# Patient Record
Sex: FEMALE | Race: BLACK OR AFRICAN AMERICAN | NOT HISPANIC OR LATINO | Employment: OTHER | RURAL
[De-identification: names, ages, dates, MRNs, and addresses within clinical notes are randomized per-mention and may not be internally consistent; named-entity substitution may affect disease eponyms.]

---

## 2021-01-08 ENCOUNTER — HISTORICAL (OUTPATIENT)
Dept: ADMINISTRATIVE | Facility: HOSPITAL | Age: 66
End: 2021-01-08

## 2021-11-04 ENCOUNTER — OFFICE VISIT (OUTPATIENT)
Dept: FAMILY MEDICINE | Facility: CLINIC | Age: 66
End: 2021-11-04
Payer: COMMERCIAL

## 2021-11-04 VITALS
BODY MASS INDEX: 31.83 KG/M2 | HEIGHT: 62 IN | OXYGEN SATURATION: 99 % | SYSTOLIC BLOOD PRESSURE: 216 MMHG | RESPIRATION RATE: 16 BRPM | HEART RATE: 88 BPM | WEIGHT: 173 LBS | DIASTOLIC BLOOD PRESSURE: 96 MMHG

## 2021-11-04 DIAGNOSIS — I10 ESSENTIAL HYPERTENSION: Primary | ICD-10-CM

## 2021-11-04 DIAGNOSIS — E11.9 TYPE 2 DIABETES MELLITUS WITHOUT COMPLICATION, WITHOUT LONG-TERM CURRENT USE OF INSULIN: ICD-10-CM

## 2021-11-04 DIAGNOSIS — E78.5 HYPERLIPIDEMIA, UNSPECIFIED HYPERLIPIDEMIA TYPE: ICD-10-CM

## 2021-11-04 DIAGNOSIS — Z11.59 SCREENING FOR VIRAL DISEASE: ICD-10-CM

## 2021-11-04 PROCEDURE — 82043 MICROALBUMIN / CREATININE RATIO URINE: ICD-10-PCS | Mod: ,,, | Performed by: CLINICAL MEDICAL LABORATORY

## 2021-11-04 PROCEDURE — 86803 HEPATITIS C ANTIBODY: ICD-10-PCS | Mod: ,,, | Performed by: CLINICAL MEDICAL LABORATORY

## 2021-11-04 PROCEDURE — 83036 HEMOGLOBIN A1C: ICD-10-PCS | Mod: ,,, | Performed by: CLINICAL MEDICAL LABORATORY

## 2021-11-04 PROCEDURE — 82570 ASSAY OF URINE CREATININE: CPT | Mod: ,,, | Performed by: CLINICAL MEDICAL LABORATORY

## 2021-11-04 PROCEDURE — 82570 MICROALBUMIN / CREATININE RATIO URINE: ICD-10-PCS | Mod: ,,, | Performed by: CLINICAL MEDICAL LABORATORY

## 2021-11-04 PROCEDURE — 82043 UR ALBUMIN QUANTITATIVE: CPT | Mod: ,,, | Performed by: CLINICAL MEDICAL LABORATORY

## 2021-11-04 PROCEDURE — 86803 HEPATITIS C AB TEST: CPT | Mod: ,,, | Performed by: CLINICAL MEDICAL LABORATORY

## 2021-11-04 PROCEDURE — G0439 PR MEDICARE ANNUAL WELLNESS SUBSEQUENT VISIT: ICD-10-PCS | Mod: ,,, | Performed by: NURSE PRACTITIONER

## 2021-11-04 PROCEDURE — 80061 LIPID PANEL: ICD-10-PCS | Mod: ,,, | Performed by: CLINICAL MEDICAL LABORATORY

## 2021-11-04 PROCEDURE — 80061 LIPID PANEL: CPT | Mod: ,,, | Performed by: CLINICAL MEDICAL LABORATORY

## 2021-11-04 PROCEDURE — 83036 HEMOGLOBIN GLYCOSYLATED A1C: CPT | Mod: ,,, | Performed by: CLINICAL MEDICAL LABORATORY

## 2021-11-04 PROCEDURE — G0439 PPPS, SUBSEQ VISIT: HCPCS | Mod: ,,, | Performed by: NURSE PRACTITIONER

## 2021-11-04 RX ORDER — ATORVASTATIN CALCIUM 20 MG/1
20 TABLET, FILM COATED ORAL NIGHTLY
Qty: 90 TABLET | Refills: 0 | Status: SHIPPED | OUTPATIENT
Start: 2021-11-04 | End: 2021-12-01 | Stop reason: DRUGHIGH

## 2021-11-04 RX ORDER — METFORMIN HYDROCHLORIDE 500 MG/1
500 TABLET ORAL 2 TIMES DAILY
COMMUNITY
Start: 2021-05-22 | End: 2021-11-04 | Stop reason: SDUPTHER

## 2021-11-04 RX ORDER — ATORVASTATIN CALCIUM 20 MG/1
20 TABLET, FILM COATED ORAL NIGHTLY
COMMUNITY
Start: 2021-05-22 | End: 2021-11-04 | Stop reason: SDUPTHER

## 2021-11-04 RX ORDER — AMLODIPINE BESYLATE 10 MG/1
10 TABLET ORAL DAILY
COMMUNITY
Start: 2021-05-22 | End: 2021-11-04 | Stop reason: SDUPTHER

## 2021-11-04 RX ORDER — AMLODIPINE BESYLATE 10 MG/1
10 TABLET ORAL DAILY
Qty: 90 TABLET | Refills: 0 | Status: SHIPPED | OUTPATIENT
Start: 2021-11-04 | End: 2022-01-03 | Stop reason: SDUPTHER

## 2021-11-04 RX ORDER — METFORMIN HYDROCHLORIDE 500 MG/1
500 TABLET ORAL 2 TIMES DAILY
Qty: 180 TABLET | Refills: 0 | Status: SHIPPED | OUTPATIENT
Start: 2021-11-04 | End: 2022-01-03 | Stop reason: SDUPTHER

## 2021-11-05 LAB
CHOLEST SERPL-MCNC: 232 MG/DL (ref 0–200)
CHOLEST/HDLC SERPL: 3.5 {RATIO}
CREAT UR-MCNC: 73 MG/DL (ref 28–219)
EST. AVERAGE GLUCOSE BLD GHB EST-MCNC: 134 MG/DL
HBA1C MFR BLD HPLC: 6.6 % (ref 4.5–6.6)
HCV AB SER QL: NORMAL
HDLC SERPL-MCNC: 67 MG/DL (ref 40–60)
LDLC SERPL CALC-MCNC: 128 MG/DL
LDLC/HDLC SERPL: 1.9 {RATIO}
MICROALBUMIN UR-MCNC: 2.5 MG/DL (ref 0–2.8)
MICROALBUMIN/CREAT RATIO PNL UR: 34.2 MG/G (ref 0–30)
NONHDLC SERPL-MCNC: 165 MG/DL
TRIGL SERPL-MCNC: 186 MG/DL (ref 35–150)
VLDLC SERPL-MCNC: 37 MG/DL

## 2021-11-08 ENCOUNTER — TELEPHONE (OUTPATIENT)
Dept: FAMILY MEDICINE | Facility: CLINIC | Age: 66
End: 2021-11-08
Payer: COMMERCIAL

## 2021-12-01 ENCOUNTER — OFFICE VISIT (OUTPATIENT)
Dept: FAMILY MEDICINE | Facility: CLINIC | Age: 66
End: 2021-12-01
Payer: COMMERCIAL

## 2021-12-01 VITALS
SYSTOLIC BLOOD PRESSURE: 173 MMHG | OXYGEN SATURATION: 100 % | HEIGHT: 62 IN | TEMPERATURE: 98 F | WEIGHT: 172 LBS | BODY MASS INDEX: 31.65 KG/M2 | DIASTOLIC BLOOD PRESSURE: 91 MMHG | HEART RATE: 90 BPM

## 2021-12-01 DIAGNOSIS — E11.9 TYPE 2 DIABETES MELLITUS WITHOUT COMPLICATION, WITHOUT LONG-TERM CURRENT USE OF INSULIN: Primary | ICD-10-CM

## 2021-12-01 DIAGNOSIS — I10 ESSENTIAL HYPERTENSION: ICD-10-CM

## 2021-12-01 DIAGNOSIS — E78.5 HYPERLIPIDEMIA, UNSPECIFIED HYPERLIPIDEMIA TYPE: ICD-10-CM

## 2021-12-01 LAB — GLUCOSE SERPL-MCNC: 126 MG/DL (ref 70–110)

## 2021-12-01 PROCEDURE — 82962 POCT GLUCOSE, HAND-HELD DEVICE: ICD-10-PCS | Mod: ,,, | Performed by: NURSE PRACTITIONER

## 2021-12-01 PROCEDURE — 82962 GLUCOSE BLOOD TEST: CPT | Mod: ,,, | Performed by: NURSE PRACTITIONER

## 2021-12-01 PROCEDURE — 99213 OFFICE O/P EST LOW 20 MIN: CPT | Mod: ,,, | Performed by: NURSE PRACTITIONER

## 2021-12-01 PROCEDURE — 99213 PR OFFICE/OUTPT VISIT, EST, LEVL III, 20-29 MIN: ICD-10-PCS | Mod: ,,, | Performed by: NURSE PRACTITIONER

## 2021-12-01 RX ORDER — ATORVASTATIN CALCIUM 40 MG/1
40 TABLET, FILM COATED ORAL DAILY
Qty: 90 TABLET | Refills: 0 | Status: SHIPPED | OUTPATIENT
Start: 2021-12-01 | End: 2022-01-03 | Stop reason: SDUPTHER

## 2021-12-01 RX ORDER — LOSARTAN POTASSIUM 25 MG/1
25 TABLET ORAL DAILY
Qty: 90 TABLET | Refills: 0 | Status: SHIPPED | OUTPATIENT
Start: 2021-12-01 | End: 2022-01-03 | Stop reason: SDUPTHER

## 2021-12-08 ENCOUNTER — TELEPHONE (OUTPATIENT)
Dept: FAMILY MEDICINE | Facility: CLINIC | Age: 66
End: 2021-12-08
Payer: COMMERCIAL

## 2022-01-03 ENCOUNTER — OFFICE VISIT (OUTPATIENT)
Dept: FAMILY MEDICINE | Facility: CLINIC | Age: 67
End: 2022-01-03
Payer: COMMERCIAL

## 2022-01-03 VITALS
SYSTOLIC BLOOD PRESSURE: 141 MMHG | TEMPERATURE: 97 F | HEIGHT: 62 IN | BODY MASS INDEX: 31.65 KG/M2 | OXYGEN SATURATION: 98 % | DIASTOLIC BLOOD PRESSURE: 80 MMHG | WEIGHT: 172 LBS | HEART RATE: 87 BPM

## 2022-01-03 DIAGNOSIS — E78.5 HYPERLIPIDEMIA, UNSPECIFIED HYPERLIPIDEMIA TYPE: ICD-10-CM

## 2022-01-03 DIAGNOSIS — E11.9 TYPE 2 DIABETES MELLITUS WITHOUT COMPLICATION, WITHOUT LONG-TERM CURRENT USE OF INSULIN: ICD-10-CM

## 2022-01-03 DIAGNOSIS — I10 ESSENTIAL HYPERTENSION: Primary | ICD-10-CM

## 2022-01-03 PROCEDURE — 1160F RVW MEDS BY RX/DR IN RCRD: CPT | Mod: ,,, | Performed by: NURSE PRACTITIONER

## 2022-01-03 PROCEDURE — 1159F MED LIST DOCD IN RCRD: CPT | Mod: ,,, | Performed by: NURSE PRACTITIONER

## 2022-01-03 PROCEDURE — 3008F PR BODY MASS INDEX (BMI) DOCUMENTED: ICD-10-PCS | Mod: ,,, | Performed by: NURSE PRACTITIONER

## 2022-01-03 PROCEDURE — 3079F DIAST BP 80-89 MM HG: CPT | Mod: ,,, | Performed by: NURSE PRACTITIONER

## 2022-01-03 PROCEDURE — 3008F BODY MASS INDEX DOCD: CPT | Mod: ,,, | Performed by: NURSE PRACTITIONER

## 2022-01-03 PROCEDURE — 3288F FALL RISK ASSESSMENT DOCD: CPT | Mod: ,,, | Performed by: NURSE PRACTITIONER

## 2022-01-03 PROCEDURE — 3077F SYST BP >= 140 MM HG: CPT | Mod: ,,, | Performed by: NURSE PRACTITIONER

## 2022-01-03 PROCEDURE — 1160F PR REVIEW ALL MEDS BY PRESCRIBER/CLIN PHARMACIST DOCUMENTED: ICD-10-PCS | Mod: ,,, | Performed by: NURSE PRACTITIONER

## 2022-01-03 PROCEDURE — 3077F PR MOST RECENT SYSTOLIC BLOOD PRESSURE >= 140 MM HG: ICD-10-PCS | Mod: ,,, | Performed by: NURSE PRACTITIONER

## 2022-01-03 PROCEDURE — 3079F PR MOST RECENT DIASTOLIC BLOOD PRESSURE 80-89 MM HG: ICD-10-PCS | Mod: ,,, | Performed by: NURSE PRACTITIONER

## 2022-01-03 PROCEDURE — 3288F PR FALLS RISK ASSESSMENT DOCUMENTED: ICD-10-PCS | Mod: ,,, | Performed by: NURSE PRACTITIONER

## 2022-01-03 PROCEDURE — 1101F PR PT FALLS ASSESS DOC 0-1 FALLS W/OUT INJ PAST YR: ICD-10-PCS | Mod: ,,, | Performed by: NURSE PRACTITIONER

## 2022-01-03 PROCEDURE — 99213 OFFICE O/P EST LOW 20 MIN: CPT | Mod: ,,, | Performed by: NURSE PRACTITIONER

## 2022-01-03 PROCEDURE — 99213 PR OFFICE/OUTPT VISIT, EST, LEVL III, 20-29 MIN: ICD-10-PCS | Mod: ,,, | Performed by: NURSE PRACTITIONER

## 2022-01-03 PROCEDURE — 1101F PT FALLS ASSESS-DOCD LE1/YR: CPT | Mod: ,,, | Performed by: NURSE PRACTITIONER

## 2022-01-03 PROCEDURE — 1159F PR MEDICATION LIST DOCUMENTED IN MEDICAL RECORD: ICD-10-PCS | Mod: ,,, | Performed by: NURSE PRACTITIONER

## 2022-01-03 RX ORDER — METFORMIN HYDROCHLORIDE 500 MG/1
500 TABLET ORAL 2 TIMES DAILY
Qty: 180 TABLET | Refills: 0 | Status: SHIPPED | OUTPATIENT
Start: 2022-01-03 | End: 2022-04-05 | Stop reason: SDUPTHER

## 2022-01-03 RX ORDER — LOSARTAN POTASSIUM 25 MG/1
25 TABLET ORAL DAILY
Qty: 90 TABLET | Refills: 0 | Status: SHIPPED | OUTPATIENT
Start: 2022-01-03 | End: 2022-04-05 | Stop reason: SDUPTHER

## 2022-01-03 RX ORDER — AMLODIPINE BESYLATE 10 MG/1
10 TABLET ORAL DAILY
Qty: 90 TABLET | Refills: 0 | Status: SHIPPED | OUTPATIENT
Start: 2022-01-03 | End: 2022-04-05 | Stop reason: SDUPTHER

## 2022-01-03 RX ORDER — ATORVASTATIN CALCIUM 40 MG/1
40 TABLET, FILM COATED ORAL DAILY
Qty: 90 TABLET | Refills: 0 | Status: SHIPPED | OUTPATIENT
Start: 2022-01-03 | End: 2022-04-05 | Stop reason: SDUPTHER

## 2022-01-03 NOTE — PATIENT INSTRUCTIONS
Continue current medications   Patient Education       DASH Diet   About this topic   DASH stands for Dietary Approaches to Stop Hypertension. The DASH diet may help you lower blood pressure. It may also help keep you from getting high blood pressure. You will eat less fat and more fiber on the DASH diet.  This diet gives you more minerals that fight high blood pressure. Some nutrients in this diet are:  · Potassium ? Acts to help you get rid of salt. This may help to lower blood pressure.  · Calcium ? Makes blood vessels and muscles work the right way  · Magnesium - Helps blood vessels relax  · Fiber ? Helps you feel full. It also helps digestion.  What will the results be?   The DASH diet may help you:  · Lower your blood pressure and cholesterol  · Lower your risk for cancer, heart disease, heart attack, and stroke. It may also lower your risk for heart failure, kidney stones, and diabetes.  · Lose weight or keep a healthy weight  What lifestyle changes are needed?   · Add regular exercise to get the most help from this diet.  · Try to lower stress. Find ways to relax.  · Stop smoking. Avoid secondhand smoke.  · Limit alcohol intake.  What changes to diet are needed?   · Know about poor eating habits. Then, you can fix them as you work with the program.  · This diet encourages fruits and vegetables, whole grains, lean meats, healthy fats, and low-fat or fat-free dairy products.  · This diet is lower in saturated fats, trans-fats, cholesterol, added sugars, and sodium.  Who should use this diet?   This eating plan is good for the whole family. It is also good for people with high blood pressure and those at risk for high blood pressure.  What foods are good to eat?   · Grains: Try to eat 6 to 8 servings of whole grain, high fiber foods each day. These are bread, cereals, brown rice, or pasta.  · Fruits and vegetables: Eat 4 to 5 servings each day. Try to pick many kinds and colors. Fresh or frozen are best. Look  for low sodium or salt-free if you choose canned.  · Dairy: Try to eat 2 to 3 servings of fat free and low fat milk products each day.  · Lean meats, poultry, and seafood: Try to eat 6 servings or less of lean meats, poultry, and seafood each day. Try to choose more low fat or lean meats like chicken and turkey. Eat less red meat. Eat more fish instead.  · Nuts, seeds, and legumes (dry beans and peas): Try to eat 4 to 5 servings each week. Try to pick nuts such as almonds and walnuts, sunflower seeds, peanut butter, soy beans, lentils, kidney beans, and split peas.  · Fats and oils: Try to eat 2 to 3 servings of fats and oils each day. Eat good fats found in fish, nuts, and avocados. Try using olive oil or vegetable oils such as canola oil. Other good oils to try are corn, safflower, sunflower, or soybean oils. Use low-sodium and low-fat salad dressing and mayonnaise.  · Condiments: Pepper, herbs, spices, vinegar, lemon or lime juices are great for seasoning. Be careful to choose low-sodium or salt-free products if you use broths, soups, or soy sauce.  · Sweets: Try to eat less than 5 servings each week. Choose low-fat and trans fat-free desserts. These are things like fruit flavored gelatin, sorbet, jelly beans, gera crackers, animal crackers, low-fat fig bars, and mary snaps. Eat fruit to satisfy your desire for sweets.     What foods should be limited or avoided?   · Grains: Salted breads, rolls, crackers, quick breads, self-rising flours, biscuit mixes, regular bread crumbs, instant hot cereals, commercially-prepared rice, pasta, stuffing mixes  · Fruits and vegetables: Commercially-prepared potatoes and vegetable mixes, regular canned vegetables and juices, vegetables frozen with sauce or pickled vegetables, processed fruits with salt or sodium  · Milk: Whole milk, malted milk, chocolate milk, buttermilk, cheese, ice cream  · Meats and beans: Smoked, cured, salted, or canned fish; meats or poultry such as  marroquin, sausages, sardines; high-fat cuts of meat like beef, lamb, or pork; chicken with the skin on it  · Fats: Cut back on solid fats like butter, lard, and margarine. Eat less food with high saturated fat, cholesterol and total fat.  · Condiments and snacks: Salted and canned peas, beans, and olives; salted snack foods; fried foods; soda or other sweetened drinks  · Sweets: High-fat baked goods such as muffins, donuts, pastries, commercial baked goods, candy bars  · If you choose to drink alcohol, limit the amount you drink. Women should have 1 drink or less per day and men should have 2 drinks or less per day.  Helpful tips   · Avoid eating canned vegetables and processed foods. These have a lot of salt in them. Look for a low-salt or low-sodium choice.  · Try baking or broiling instead of frying food.  · Write down the foods you eat. This will help you track what you have eaten each week.  · When you go to a grocery store, have a list or a meal plan. Do not shop when you are hungry to avoid cravings for foods.  · Read food labels with care. They will show you how much is in a serving. The amount is given as a percentage of the total amount you need each day. Reading labels will help you make healthy food choices.       · Avoid fast foods.  · Talk to your doctor or dietitian to see if you need vitamin and mineral supplements to help you balance your diet.  · Talk to a dietitian for help.  Where can I learn more?   [employer]  https://www.eatright.org/health/wellness/heart-and-cardiovascular-health/dash-diet-reducing-hypertension-through-diet-and-lifestyle   FamilyDoctor.org  http://familydoctor.org/familydoctor/en/prevention-wellness/food-nutrition/weight-loss/the-dash-diet-healthy-eating-to-control-your-blood-pressure.html   Last Reviewed Date   2021-03-15  Consumer Information Use and Disclaimer   This information is not specific medical advice and does not replace information you  "receive from your health care provider. This is only a brief summary of general information. It does NOT include all information about conditions, illnesses, injuries, tests, procedures, treatments, therapies, discharge instructions or life-style choices that may apply to you. You must talk with your health care provider for complete information about your health and treatment options. This information should not be used to decide whether or not to accept your health care providers advice, instructions or recommendations. Only your health care provider has the knowledge and training to provide advice that is right for you.  Copyright   Copyright © 2021 [employer] and its affiliates and/or licensors. All rights reserved.  Patient Education       Diabetes and Diet   The Basics   Written by the doctors and editors at [employer]   Why is diet important in diabetes? -- Diet is important because it is part of diabetes treatment. Many people need to change what they eat and how much they eat to help treat their diabetes. It is important for people to treat their diabetes so that they:  · Keep their blood sugar at or near a normal level  · Prevent long-term problems, such as heart or kidney problems, that can happen in people with diabetes  Changing your diet can also help treat obesity, high blood pressure, and high cholesterol. These conditions can affect people with diabetes and can lead to future problems, such as heart attacks or strokes.  Who will work with me to change my diet? -- Your doctor or nurse will work with you to make a food plan to change your diet. They might also recommend that you work with a "dietitian." A dietitian is an expert on food and eating.  Do I need to eat at the same times every day? -- When and how often you should eat depends, in part, on the diabetes medicines you take. For example:  · People who take about the same amount of insulin at the same time each day (called a "fixed regimen") " "should eat meals at the same times. This is also true for people who take pills that increase insulin levels, such as sulfonylureas. Eating meals at the same time every day helps prevent low blood sugar.  · People who adjust the dose and timing of their insulin each day (called a "flexible regimen") do not always have to eat meals at the same time. That's because they can time their insulin dose for before they plan to eat, and also adjust the dose for how much they plan to eat.  · People who take medicines that don't usually cause low blood sugar, such as metformin, don't have to eat meals at the same time every day.  What do I need to think about when planning what to eat? -- Our bodies break down the food we eat into small pieces called carbohydrates, proteins, and fats.  When planning what to eat, people with diabetes need to think about:  · Carbohydrates (or "carbs") - Carbohydrates, which are sugars that our bodies use for energy, can raise a person's blood sugar level. Your doctor, nurse, or dietitian will tell you how many carbohydrates you should eat at each meal or snack. Foods that have carbohydrates include:  ? Bread, pasta, and rice  ? Vegetables and fruits  ? Dairy foods  ? Foods and drinks with added sugar  It is best to get your carbohydrates from fruits, vegetables, whole grains, and low-fat milk. It is best to avoid drinks with added sugar, like soda, juices, and sports drinks.   · Protein - Your doctor, nurse, or dietitian will tell you how much protein you should eat each day. It is best to eat lean meats, fish, eggs, beans, peas, soy products, nuts, and seeds.  · Fats - The type of fat you eat is more important than the amount of fat. "Saturated" and "trans" fats can increase your risk for heart problems, like a heart attack.  ? Foods that have saturated fats include meat, butter, cheese, and ice cream.  ? Foods that have trans fats include processed food with "partially hydrogenated oils" on " "the ingredient list. This may include fried foods, store bought cookies, muffins, pies, and cakes.  "Monounsaturated" and "polyunsaturated" fats are better for you. Foods with these types of fat include fish, avocado, olive oil, and nuts.  · Calories - People need to eat a certain amount of calories each day to keep their weight the same. People who are overweight and want to lose weight need to eat fewer calories each day.  · Fiber - Eating foods with a lot of fiber can help control a person's blood sugar level. Foods that have a lot of fiber include apples, green beans, peas, beans, lentils, nuts, oatmeal, and whole grains.  · Salt - People who have high blood pressure should not eat foods that contain a lot of salt (also called sodium). People with high blood pressure should also eat healthy foods, such as fruits, vegetables, and low-fat dairy foods.  · Alcohol - Having more than 1 drink (for women) or 2 drinks (for men) a day can raise blood sugar levels. Also, drinks that have fruit juice or soda in them can raise blood sugar levels.  What can I do if I need to lose weight? -- If you need to lose weight, you can:  · Exercise - Try to get at least 30 minutes of physical activity a day, most days of the week. Even gentle exercise, like walking, is good for your health. Some people with diabetes need to change their medicine dose before they exercise. They might also need to check their blood sugar levels before and after exercising.  · Eat fewer calories - Your doctor, nurse, or dietitian can tell you how many calories you should eat each day in order to lose weight.  If you are worried about your weight, size, or shape, talk with your doctor, nurse, or dietitian. They can help you make changes to improve your health.  Can I eat the same foods as my family? -- Yes. You do not need to eat special foods if you have diabetes. You and your family can eat the same foods. Changing your diet is mostly about eating " healthy foods and not eating too much.  What are the other parts of diabetes treatment? -- Besides changing your diet, the other parts of diabetes treatment are:  · Exercise  · Medicines  Some people with diabetes need to learn how to match their diet and exercise with their medicine dose. For example, people who use insulin might need to choose the dose of insulin they give themselves. To choose their dose, they need to think about:  · What they plan to eat at the next meal  · How much exercise they plan to do  · What their blood sugar level is  If the diet and exercise do not match the medicine dose, a person's blood sugar level can get too low or too high. Blood sugar levels that are too low or too high can cause problems.  All topics are updated as new evidence becomes available and our peer review process is complete.  This topic retrieved from [employer] on: Sep 21, 2021.  Topic 66159 Version 7.0  Release: 29.4.2 - C29.263  © 2021 UpToDate, Inc. and/or its affiliates. All rights reserved.  Consumer Information Use and Disclaimer   This information is not specific medical advice and does not replace information you receive from your health care provider. This is only a brief summary of general information. It does NOT include all information about conditions, illnesses, injuries, tests, procedures, treatments, therapies, discharge instructions or life-style choices that may apply to you. You must talk with your health care provider for complete information about your health and treatment options. This information should not be used to decide whether or not to accept your health care provider's advice, instructions or recommendations. Only your health care provider has the knowledge and training to provide advice that is right for you. The use of this information is governed by the [employer] End User License Agreement, available at https://www.[employer]/en/solutions/[employer]/about/diana.The use of [employer]  content is governed by the [facility] Terms of Use. ©2021 [facility] All rights reserved.  Copyright   © 2021 [facility] and/or its affiliates. All rights reserved.

## 2022-01-03 NOTE — PROGRESS NOTES
Clinic note     Patient name: Dari Valverde is a 66 y.o. female   Chief compliant   Chief Complaint   Patient presents with    Follow-up     No problems at this time.        Subjective     History of present illness   In clinic for follow up after starting losartan last office visit   Blood pressure had improved, tolerating medication without problems   Does not have blood pressure log in clinic today   Hx of DM, last a1c was 6.6  Denies any acute concerns at present   Has had COVID vaccine x two doses and booster dose       Social History     Tobacco Use    Smoking status: Never Smoker    Smokeless tobacco: Never Used   Substance Use Topics    Alcohol use: Not Currently    Drug use: Never     Review of patient's allergies indicates:   Allergen Reactions    Lisinopril      Causes cough          Past Medical History:   Diagnosis Date    Diabetes     Hyperlipemia     Hypertension        Past Surgical History:   Procedure Laterality Date     SECTION      CHOLECYSTECTOMY          Family History   Problem Relation Age of Onset    Dementia Mother     Heart disease Father          Current Outpatient Medications:     blood sugar diagnostic Strp, Prodigy test strips, monitor blood glucose daily and as needed, Disp: 30 strip, Rfl: 5    amLODIPine (NORVASC) 10 MG tablet, Take 1 tablet (10 mg total) by mouth once daily., Disp: 90 tablet, Rfl: 0    atorvastatin (LIPITOR) 40 MG tablet, Take 1 tablet (40 mg total) by mouth once daily., Disp: 90 tablet, Rfl: 0    losartan (COZAAR) 25 MG tablet, Take 1 tablet (25 mg total) by mouth once daily., Disp: 90 tablet, Rfl: 0    metFORMIN (GLUCOPHAGE) 500 MG tablet, Take 1 tablet (500 mg total) by mouth 2 (two) times daily., Disp: 180 tablet, Rfl: 0    Review of Systems   Constitutional: Negative for activity change, appetite change, chills, fatigue, fever and unexpected weight change.   HENT: Negative for hearing loss, rhinorrhea and trouble swallowing.    Eyes: Negative for  "discharge and visual disturbance.   Respiratory: Negative for cough, chest tightness, shortness of breath and wheezing.    Cardiovascular: Negative for chest pain, palpitations and leg swelling.   Gastrointestinal: Negative for abdominal pain, blood in stool, change in bowel habit, constipation, diarrhea, nausea, vomiting and change in bowel habit.   Endocrine: Negative for polydipsia and polyuria.   Genitourinary: Negative for difficulty urinating, dysuria, hematuria and menstrual problem.   Musculoskeletal: Negative for arthralgias, gait problem, joint swelling, myalgias and neck pain.   Integumentary:  Negative for wound.   Neurological: Negative for dizziness, syncope, weakness, light-headedness and headaches.   Psychiatric/Behavioral: Negative for confusion, dysphoric mood and sleep disturbance.       Objective     BP (!) 141/80   Pulse 87   Temp 97 °F (36.1 °C)   Ht 5' 2" (1.575 m)   Wt 78 kg (172 lb)   SpO2 98%   BMI 31.46 kg/m²     Physical Exam   Constitutional: She is oriented to person, place, and time. No distress.   HENT:   Head: Normocephalic.   Eyes: Pupils are equal, round, and reactive to light. Conjunctivae are normal.   Cardiovascular: Normal rate and regular rhythm.   Pulmonary/Chest: Effort normal and breath sounds normal.   Abdominal: Soft. Bowel sounds are normal. She exhibits no distension. There is no abdominal tenderness.   Musculoskeletal:         General: Normal range of motion.      Cervical back: Neck supple.      Right lower leg: No edema.      Left lower leg: No edema.   Neurological: She is alert and oriented to person, place, and time. Gait normal.   Skin: Skin is warm and dry.   Psychiatric: Her behavior is normal. Mood normal.     No results found for: WBC, HGB, HCT, MCV, PLT    CMP  No results found for: NA, K, CL, CO2, GLU, BUN, CREATININE, CALCIUM, PROT, ALBUMIN, BILITOT, ALKPHOS, AST, ALT, ANIONGAP, ESTGFRAFRICA, EGFRNONAA  Lab Results   Component Value Date    HGBA1C " 6.6 11/04/2021       Lab Results   Component Value Date    CHOL 232 (H) 11/04/2021     Lab Results   Component Value Date    HDL 67 (H) 11/04/2021     Lab Results   Component Value Date    LDLCALC 128 11/04/2021     Lab Results   Component Value Date    TRIG 186 (H) 11/04/2021     Lab Results   Component Value Date    CHOLHDL 3.5 11/04/2021     No results found for: TSH      Assessment and Plan   Essential hypertension  -     amLODIPine (NORVASC) 10 MG tablet; Take 1 tablet (10 mg total) by mouth once daily.  Dispense: 90 tablet; Refill: 0  -     losartan (COZAAR) 25 MG tablet; Take 1 tablet (25 mg total) by mouth once daily.  Dispense: 90 tablet; Refill: 0    Type 2 diabetes mellitus without complication, without long-term current use of insulin  -     metFORMIN (GLUCOPHAGE) 500 MG tablet; Take 1 tablet (500 mg total) by mouth 2 (two) times daily.  Dispense: 180 tablet; Refill: 0    Hyperlipidemia, unspecified hyperlipidemia type  -     atorvastatin (LIPITOR) 40 MG tablet; Take 1 tablet (40 mg total) by mouth once daily.  Dispense: 90 tablet; Refill: 0          Patient Instructions  Patient Instructions   Continue current medications   Patient Education       DASH Diet   About this topic   DASH stands for Dietary Approaches to Stop Hypertension. The DASH diet may help you lower blood pressure. It may also help keep you from getting high blood pressure. You will eat less fat and more fiber on the DASH diet.  This diet gives you more minerals that fight high blood pressure. Some nutrients in this diet are:  Potassium ? Acts to help you get rid of salt. This may help to lower blood pressure.  Calcium ? Makes blood vessels and muscles work the right way  Magnesium - Helps blood vessels relax  Fiber ? Helps you feel full. It also helps digestion.  What will the results be?   The DASH diet may help you:  Lower your blood pressure and cholesterol  Lower your risk for cancer, heart disease, heart attack, and stroke. It may  also lower your risk for heart failure, kidney stones, and diabetes.  Lose weight or keep a healthy weight  What lifestyle changes are needed?   Add regular exercise to get the most help from this diet.  Try to lower stress. Find ways to relax.  Stop smoking. Avoid secondhand smoke.  Limit alcohol intake.  What changes to diet are needed?   Know about poor eating habits. Then, you can fix them as you work with the program.  This diet encourages fruits and vegetables, whole grains, lean meats, healthy fats, and low-fat or fat-free dairy products.  This diet is lower in saturated fats, trans-fats, cholesterol, added sugars, and sodium.  Who should use this diet?   This eating plan is good for the whole family. It is also good for people with high blood pressure and those at risk for high blood pressure.  What foods are good to eat?   Grains: Try to eat 6 to 8 servings of whole grain, high fiber foods each day. These are bread, cereals, brown rice, or pasta.  Fruits and vegetables: Eat 4 to 5 servings each day. Try to pick many kinds and colors. Fresh or frozen are best. Look for low sodium or salt-free if you choose canned.  Dairy: Try to eat 2 to 3 servings of fat free and low fat milk products each day.  Lean meats, poultry, and seafood: Try to eat 6 servings or less of lean meats, poultry, and seafood each day. Try to choose more low fat or lean meats like chicken and turkey. Eat less red meat. Eat more fish instead.  Nuts, seeds, and legumes (dry beans and peas): Try to eat 4 to 5 servings each week. Try to pick nuts such as almonds and walnuts, sunflower seeds, peanut butter, soy beans, lentils, kidney beans, and split peas.  Fats and oils: Try to eat 2 to 3 servings of fats and oils each day. Eat good fats found in fish, nuts, and avocados. Try using olive oil or vegetable oils such as canola oil. Other good oils to try are corn, safflower, sunflower, or soybean oils. Use low-sodium and low-fat salad dressing  and mayonnaise.  Condiments: Pepper, herbs, spices, vinegar, lemon or lime juices are great for seasoning. Be careful to choose low-sodium or salt-free products if you use broths, soups, or soy sauce.  Sweets: Try to eat less than 5 servings each week. Choose low-fat and trans fat-free desserts. These are things like fruit flavored gelatin, sorbet, jelly beans, gera crackers, animal crackers, low-fat fig bars, and mary snaps. Eat fruit to satisfy your desire for sweets.     What foods should be limited or avoided?   Grains: Salted breads, rolls, crackers, quick breads, self-rising flours, biscuit mixes, regular bread crumbs, instant hot cereals, commercially-prepared rice, pasta, stuffing mixes  Fruits and vegetables: Commercially-prepared potatoes and vegetable mixes, regular canned vegetables and juices, vegetables frozen with sauce or pickled vegetables, processed fruits with salt or sodium  Milk: Whole milk, malted milk, chocolate milk, buttermilk, cheese, ice cream  Meats and beans: Smoked, cured, salted, or canned fish; meats or poultry such as marroquin, sausages, sardines; high-fat cuts of meat like beef, lamb, or pork; chicken with the skin on it  Fats: Cut back on solid fats like butter, lard, and margarine. Eat less food with high saturated fat, cholesterol and total fat.  Condiments and snacks: Salted and canned peas, beans, and olives; salted snack foods; fried foods; soda or other sweetened drinks  Sweets: High-fat baked goods such as muffins, donuts, pastries, commercial baked goods, candy bars  If you choose to drink alcohol, limit the amount you drink. Women should have 1 drink or less per day and men should have 2 drinks or less per day.  Helpful tips   Avoid eating canned vegetables and processed foods. These have a lot of salt in them. Look for a low-salt or low-sodium choice.  Try baking or broiling instead of frying food.  Write down the foods you eat. This will help you track what you have  eaten each week.  When you go to a grocery store, have a list or a meal plan. Do not shop when you are hungry to avoid cravings for foods.  Read food labels with care. They will show you how much is in a serving. The amount is given as a percentage of the total amount you need each day. Reading labels will help you make healthy food choices.       Avoid fast foods.  Talk to your doctor or dietitian to see if you need vitamin and mineral supplements to help you balance your diet.  Talk to a dietitian for help.  Where can I learn more?   Academy [employer] Dietetics  https://www.eatright.org/health/wellness/heart-and-cardiovascular-health/dash-diet-reducing-hypertension-through-diet-and-lifestyle   FamilyDoctor.org  http://familydoctor.org/familydoctor/en/prevention-wellness/food-nutrition/weight-loss/the-dash-diet-healthy-eating-to-control-your-blood-pressure.html   Last Reviewed Date   2021-03-15  Consumer Information Use and Disclaimer   This information is not specific medical advice and does not replace information you receive from your health care provider. This is only a brief summary of general information. It does NOT include all information about conditions, illnesses, injuries, tests, procedures, treatments, therapies, discharge instructions or life-style choices that may apply to you. You must talk with your health care provider for complete information about your health and treatment options. This information should not be used to decide whether or not to accept your health care providers advice, instructions or recommendations. Only your health care provider has the knowledge and training to provide advice that is right for you.  Copyright   Copyright © 2021 UpToDate, Inc. and its affiliates and/or licensors. All rights reserved.  Patient Education       Diabetes and Diet   The Basics   Written by the doctors and editors at [employer]   Why is diet important in diabetes? -- Diet is important because  "it is part of diabetes treatment. Many people need to change what they eat and how much they eat to help treat their diabetes. It is important for people to treat their diabetes so that they:  Keep their blood sugar at or near a normal level  Prevent long-term problems, such as heart or kidney problems, that can happen in people with diabetes  Changing your diet can also help treat obesity, high blood pressure, and high cholesterol. These conditions can affect people with diabetes and can lead to future problems, such as heart attacks or strokes.  Who will work with me to change my diet? -- Your doctor or nurse will work with you to make a food plan to change your diet. They might also recommend that you work with a "dietitian." A dietitian is an expert on food and eating.  Do I need to eat at the same times every day? -- When and how often you should eat depends, in part, on the diabetes medicines you take. For example:  People who take about the same amount of insulin at the same time each day (called a "fixed regimen") should eat meals at the same times. This is also true for people who take pills that increase insulin levels, such as sulfonylureas. Eating meals at the same time every day helps prevent low blood sugar.  People who adjust the dose and timing of their insulin each day (called a "flexible regimen") do not always have to eat meals at the same time. That's because they can time their insulin dose for before they plan to eat, and also adjust the dose for how much they plan to eat.  People who take medicines that don't usually cause low blood sugar, such as metformin, don't have to eat meals at the same time every day.  What do I need to think about when planning what to eat? -- Our bodies break down the food we eat into small pieces called carbohydrates, proteins, and fats.  When planning what to eat, people with diabetes need to think about:  Carbohydrates (or "carbs") - Carbohydrates, which are " "sugars that our bodies use for energy, can raise a person's blood sugar level. Your doctor, nurse, or dietitian will tell you how many carbohydrates you should eat at each meal or snack. Foods that have carbohydrates include:  Bread, pasta, and rice  Vegetables and fruits  Dairy foods  Foods and drinks with added sugar  It is best to get your carbohydrates from fruits, vegetables, whole grains, and low-fat milk. It is best to avoid drinks with added sugar, like soda, juices, and sports drinks.   Protein - Your doctor, nurse, or dietitian will tell you how much protein you should eat each day. It is best to eat lean meats, fish, eggs, beans, peas, soy products, nuts, and seeds.  Fats - The type of fat you eat is more important than the amount of fat. "Saturated" and "trans" fats can increase your risk for heart problems, like a heart attack.  Foods that have saturated fats include meat, butter, cheese, and ice cream.  Foods that have trans fats include processed food with "partially hydrogenated oils" on the ingredient list. This may include fried foods, store bought cookies, muffins, pies, and cakes.  "Monounsaturated" and "polyunsaturated" fats are better for you. Foods with these types of fat include fish, avocado, olive oil, and nuts.  Calories - People need to eat a certain amount of calories each day to keep their weight the same. People who are overweight and want to lose weight need to eat fewer calories each day.  Fiber - Eating foods with a lot of fiber can help control a person's blood sugar level. Foods that have a lot of fiber include apples, green beans, peas, beans, lentils, nuts, oatmeal, and whole grains.  Salt - People who have high blood pressure should not eat foods that contain a lot of salt (also called sodium). People with high blood pressure should also eat healthy foods, such as fruits, vegetables, and low-fat dairy foods.  Alcohol - Having more than 1 drink (for women) or 2 drinks (for men) " a day can raise blood sugar levels. Also, drinks that have fruit juice or soda in them can raise blood sugar levels.  What can I do if I need to lose weight? -- If you need to lose weight, you can:  Exercise - Try to get at least 30 minutes of physical activity a day, most days of the week. Even gentle exercise, like walking, is good for your health. Some people with diabetes need to change their medicine dose before they exercise. They might also need to check their blood sugar levels before and after exercising.  Eat fewer calories - Your doctor, nurse, or dietitian can tell you how many calories you should eat each day in order to lose weight.  If you are worried about your weight, size, or shape, talk with your doctor, nurse, or dietitian. They can help you make changes to improve your health.  Can I eat the same foods as my family? -- Yes. You do not need to eat special foods if you have diabetes. You and your family can eat the same foods. Changing your diet is mostly about eating healthy foods and not eating too much.  What are the other parts of diabetes treatment? -- Besides changing your diet, the other parts of diabetes treatment are:  Exercise  Medicines  Some people with diabetes need to learn how to match their diet and exercise with their medicine dose. For example, people who use insulin might need to choose the dose of insulin they give themselves. To choose their dose, they need to think about:  What they plan to eat at the next meal  How much exercise they plan to do  What their blood sugar level is  If the diet and exercise do not match the medicine dose, a person's blood sugar level can get too low or too high. Blood sugar levels that are too low or too high can cause problems.  All topics are updated as new evidence becomes available and our peer review process is complete.  This topic retrieved from [employer] on: Sep 21, 2021.  Topic 39633 Version 7.0  Release: 29.4.2 - C29.263  © 2021  UpToDate, Inc. and/or its affiliates. All rights reserved.  Consumer Information Use and Disclaimer   This information is not specific medical advice and does not replace information you receive from your health care provider. This is only a brief summary of general information. It does NOT include all information about conditions, illnesses, injuries, tests, procedures, treatments, therapies, discharge instructions or life-style choices that may apply to you. You must talk with your health care provider for complete information about your health and treatment options. This information should not be used to decide whether or not to accept your health care provider's advice, instructions or recommendations. Only your health care provider has the knowledge and training to provide advice that is right for you. The use of this information is governed by the [employer] End User License Agreement, available at https://www.[employer]/en/solutions/[employer]/about/diana.The use of [employer] content is governed by the [employer] Terms of Use. ©2021 UpToDate, Inc. All rights reserved.  Copyright   © 2021 UpToDate, Inc. and/or its affiliates. All rights reserved.        I have reviewed the clinic encounter note and agree with the assessment and plan as put forth by the nurse practitioner.  Dr. Lee Simental M.D.

## 2022-01-13 ENCOUNTER — HOSPITAL ENCOUNTER (OUTPATIENT)
Dept: RADIOLOGY | Facility: HOSPITAL | Age: 67
Discharge: HOME | End: 2022-01-13
Payer: COMMERCIAL

## 2022-01-13 DIAGNOSIS — Z12.31 VISIT FOR SCREENING MAMMOGRAM: ICD-10-CM

## 2022-01-13 PROCEDURE — 77067 SCR MAMMO BI INCL CAD: CPT | Mod: TC

## 2022-04-05 ENCOUNTER — OFFICE VISIT (OUTPATIENT)
Dept: FAMILY MEDICINE | Facility: CLINIC | Age: 67
End: 2022-04-05
Payer: COMMERCIAL

## 2022-04-05 VITALS
BODY MASS INDEX: 32.39 KG/M2 | OXYGEN SATURATION: 99 % | HEART RATE: 92 BPM | SYSTOLIC BLOOD PRESSURE: 171 MMHG | DIASTOLIC BLOOD PRESSURE: 86 MMHG | TEMPERATURE: 97 F | HEIGHT: 62 IN | WEIGHT: 176 LBS

## 2022-04-05 DIAGNOSIS — E78.5 HYPERLIPIDEMIA, UNSPECIFIED HYPERLIPIDEMIA TYPE: ICD-10-CM

## 2022-04-05 DIAGNOSIS — Z13.29 SCREENING FOR HYPOTHYROIDISM: ICD-10-CM

## 2022-04-05 DIAGNOSIS — I10 ESSENTIAL HYPERTENSION: Primary | ICD-10-CM

## 2022-04-05 DIAGNOSIS — E11.9 TYPE 2 DIABETES MELLITUS WITHOUT COMPLICATION, WITHOUT LONG-TERM CURRENT USE OF INSULIN: ICD-10-CM

## 2022-04-05 PROCEDURE — 83036 HEMOGLOBIN GLYCOSYLATED A1C: CPT | Mod: ,,, | Performed by: CLINICAL MEDICAL LABORATORY

## 2022-04-05 PROCEDURE — 3077F PR MOST RECENT SYSTOLIC BLOOD PRESSURE >= 140 MM HG: ICD-10-PCS | Mod: ,,, | Performed by: NURSE PRACTITIONER

## 2022-04-05 PROCEDURE — 3288F PR FALLS RISK ASSESSMENT DOCUMENTED: ICD-10-PCS | Mod: ,,, | Performed by: NURSE PRACTITIONER

## 2022-04-05 PROCEDURE — 84443 TSH: ICD-10-PCS | Mod: GZ,,, | Performed by: CLINICAL MEDICAL LABORATORY

## 2022-04-05 PROCEDURE — 80053 COMPREHENSIVE METABOLIC PANEL: ICD-10-PCS | Mod: ,,, | Performed by: CLINICAL MEDICAL LABORATORY

## 2022-04-05 PROCEDURE — 1159F PR MEDICATION LIST DOCUMENTED IN MEDICAL RECORD: ICD-10-PCS | Mod: ,,, | Performed by: NURSE PRACTITIONER

## 2022-04-05 PROCEDURE — 3008F PR BODY MASS INDEX (BMI) DOCUMENTED: ICD-10-PCS | Mod: ,,, | Performed by: NURSE PRACTITIONER

## 2022-04-05 PROCEDURE — 1159F MED LIST DOCD IN RCRD: CPT | Mod: ,,, | Performed by: NURSE PRACTITIONER

## 2022-04-05 PROCEDURE — 3288F FALL RISK ASSESSMENT DOCD: CPT | Mod: ,,, | Performed by: NURSE PRACTITIONER

## 2022-04-05 PROCEDURE — 84443 ASSAY THYROID STIM HORMONE: CPT | Mod: GZ,,, | Performed by: CLINICAL MEDICAL LABORATORY

## 2022-04-05 PROCEDURE — 3079F DIAST BP 80-89 MM HG: CPT | Mod: ,,, | Performed by: NURSE PRACTITIONER

## 2022-04-05 PROCEDURE — 1160F RVW MEDS BY RX/DR IN RCRD: CPT | Mod: ,,, | Performed by: NURSE PRACTITIONER

## 2022-04-05 PROCEDURE — 3044F HG A1C LEVEL LT 7.0%: CPT | Mod: ,,, | Performed by: NURSE PRACTITIONER

## 2022-04-05 PROCEDURE — 80061 LIPID PANEL: CPT | Mod: ,,, | Performed by: CLINICAL MEDICAL LABORATORY

## 2022-04-05 PROCEDURE — 3044F PR MOST RECENT HEMOGLOBIN A1C LEVEL <7.0%: ICD-10-PCS | Mod: ,,, | Performed by: NURSE PRACTITIONER

## 2022-04-05 PROCEDURE — 80061 LIPID PANEL: ICD-10-PCS | Mod: ,,, | Performed by: CLINICAL MEDICAL LABORATORY

## 2022-04-05 PROCEDURE — 3079F PR MOST RECENT DIASTOLIC BLOOD PRESSURE 80-89 MM HG: ICD-10-PCS | Mod: ,,, | Performed by: NURSE PRACTITIONER

## 2022-04-05 PROCEDURE — 99214 OFFICE O/P EST MOD 30 MIN: CPT | Mod: ,,, | Performed by: NURSE PRACTITIONER

## 2022-04-05 PROCEDURE — 83036 HEMOGLOBIN A1C: ICD-10-PCS | Mod: ,,, | Performed by: CLINICAL MEDICAL LABORATORY

## 2022-04-05 PROCEDURE — 1101F PR PT FALLS ASSESS DOC 0-1 FALLS W/OUT INJ PAST YR: ICD-10-PCS | Mod: ,,, | Performed by: NURSE PRACTITIONER

## 2022-04-05 PROCEDURE — 1126F AMNT PAIN NOTED NONE PRSNT: CPT | Mod: ,,, | Performed by: NURSE PRACTITIONER

## 2022-04-05 PROCEDURE — 85025 CBC WITH DIFFERENTIAL: ICD-10-PCS | Mod: ,,, | Performed by: CLINICAL MEDICAL LABORATORY

## 2022-04-05 PROCEDURE — 1101F PT FALLS ASSESS-DOCD LE1/YR: CPT | Mod: ,,, | Performed by: NURSE PRACTITIONER

## 2022-04-05 PROCEDURE — 3077F SYST BP >= 140 MM HG: CPT | Mod: ,,, | Performed by: NURSE PRACTITIONER

## 2022-04-05 PROCEDURE — 85025 COMPLETE CBC W/AUTO DIFF WBC: CPT | Mod: ,,, | Performed by: CLINICAL MEDICAL LABORATORY

## 2022-04-05 PROCEDURE — 80053 COMPREHEN METABOLIC PANEL: CPT | Mod: ,,, | Performed by: CLINICAL MEDICAL LABORATORY

## 2022-04-05 PROCEDURE — 3008F BODY MASS INDEX DOCD: CPT | Mod: ,,, | Performed by: NURSE PRACTITIONER

## 2022-04-05 PROCEDURE — 99214 PR OFFICE/OUTPT VISIT, EST, LEVL IV, 30-39 MIN: ICD-10-PCS | Mod: ,,, | Performed by: NURSE PRACTITIONER

## 2022-04-05 PROCEDURE — 1126F PR PAIN SEVERITY QUANTIFIED, NO PAIN PRESENT: ICD-10-PCS | Mod: ,,, | Performed by: NURSE PRACTITIONER

## 2022-04-05 PROCEDURE — 1160F PR REVIEW ALL MEDS BY PRESCRIBER/CLIN PHARMACIST DOCUMENTED: ICD-10-PCS | Mod: ,,, | Performed by: NURSE PRACTITIONER

## 2022-04-05 RX ORDER — METFORMIN HYDROCHLORIDE 500 MG/1
500 TABLET ORAL 2 TIMES DAILY
Qty: 180 TABLET | Refills: 1 | Status: SHIPPED | OUTPATIENT
Start: 2022-04-05 | End: 2022-11-01 | Stop reason: SDUPTHER

## 2022-04-05 RX ORDER — AMLODIPINE BESYLATE 10 MG/1
10 TABLET ORAL DAILY
Qty: 90 TABLET | Refills: 1 | Status: SHIPPED | OUTPATIENT
Start: 2022-04-05 | End: 2022-11-01 | Stop reason: SDUPTHER

## 2022-04-05 RX ORDER — LOSARTAN POTASSIUM 25 MG/1
25 TABLET ORAL DAILY
Qty: 90 TABLET | Refills: 1 | Status: SHIPPED | OUTPATIENT
Start: 2022-04-05 | End: 2022-11-01 | Stop reason: SDUPTHER

## 2022-04-05 RX ORDER — ATORVASTATIN CALCIUM 40 MG/1
40 TABLET, FILM COATED ORAL DAILY
Qty: 90 TABLET | Refills: 1 | Status: SHIPPED | OUTPATIENT
Start: 2022-04-05 | End: 2022-11-01 | Stop reason: SDUPTHER

## 2022-04-05 NOTE — PATIENT INSTRUCTIONS
Lab obtained in clinic today, we will notify you of results and any necessary changes to plan of care     Refills on routine medications   You can contact clinic if you decide to proceed with Moderna COVID vaccine booster dose   Blood pressure log and bring in to all clinic visits   Follow up in 6 months and as needed

## 2022-04-06 LAB
ALBUMIN SERPL BCP-MCNC: 3.8 G/DL (ref 3.5–5)
ALBUMIN/GLOB SERPL: 0.9 {RATIO}
ALP SERPL-CCNC: 92 U/L (ref 55–142)
ALT SERPL W P-5'-P-CCNC: 28 U/L (ref 13–56)
ANION GAP SERPL CALCULATED.3IONS-SCNC: 9 MMOL/L (ref 7–16)
AST SERPL W P-5'-P-CCNC: 10 U/L (ref 15–37)
BASOPHILS # BLD AUTO: 0.03 K/UL (ref 0–0.2)
BASOPHILS NFR BLD AUTO: 0.5 % (ref 0–1)
BILIRUB SERPL-MCNC: 0.2 MG/DL (ref 0–1.2)
BUN SERPL-MCNC: 8 MG/DL (ref 7–18)
BUN/CREAT SERPL: 11 (ref 6–20)
CALCIUM SERPL-MCNC: 9.6 MG/DL (ref 8.5–10.1)
CHLORIDE SERPL-SCNC: 108 MMOL/L (ref 98–107)
CHOLEST SERPL-MCNC: 139 MG/DL (ref 0–200)
CHOLEST/HDLC SERPL: 2 {RATIO}
CO2 SERPL-SCNC: 27 MMOL/L (ref 21–32)
CREAT SERPL-MCNC: 0.76 MG/DL (ref 0.55–1.02)
DIFFERENTIAL METHOD BLD: ABNORMAL
EOSINOPHIL # BLD AUTO: 0.07 K/UL (ref 0–0.5)
EOSINOPHIL NFR BLD AUTO: 1.2 % (ref 1–4)
ERYTHROCYTE [DISTWIDTH] IN BLOOD BY AUTOMATED COUNT: 12.9 % (ref 11.5–14.5)
EST. AVERAGE GLUCOSE BLD GHB EST-MCNC: 127 MG/DL
GLOBULIN SER-MCNC: 4.2 G/DL (ref 2–4)
GLUCOSE SERPL-MCNC: 93 MG/DL (ref 74–106)
HBA1C MFR BLD HPLC: 6.4 % (ref 4.5–6.6)
HCT VFR BLD AUTO: 36.3 % (ref 38–47)
HDLC SERPL-MCNC: 70 MG/DL (ref 40–60)
HGB BLD-MCNC: 11.7 G/DL (ref 12–16)
IMM GRANULOCYTES # BLD AUTO: 0.06 K/UL (ref 0–0.04)
IMM GRANULOCYTES NFR BLD: 1 % (ref 0–0.4)
LDLC SERPL CALC-MCNC: 44 MG/DL
LDLC/HDLC SERPL: 0.6 {RATIO}
LYMPHOCYTES # BLD AUTO: 2.76 K/UL (ref 1–4.8)
LYMPHOCYTES NFR BLD AUTO: 47.1 % (ref 27–41)
MCH RBC QN AUTO: 28.9 PG (ref 27–31)
MCHC RBC AUTO-ENTMCNC: 32.2 G/DL (ref 32–36)
MCV RBC AUTO: 89.6 FL (ref 80–96)
MONOCYTES # BLD AUTO: 0.58 K/UL (ref 0–0.8)
MONOCYTES NFR BLD AUTO: 9.9 % (ref 2–6)
MPC BLD CALC-MCNC: 10.6 FL (ref 9.4–12.4)
NEUTROPHILS # BLD AUTO: 2.36 K/UL (ref 1.8–7.7)
NEUTROPHILS NFR BLD AUTO: 40.3 % (ref 53–65)
NONHDLC SERPL-MCNC: 69 MG/DL
NRBC # BLD AUTO: 0 X10E3/UL
NRBC, AUTO (.00): 0 %
PLATELET # BLD AUTO: 398 K/UL (ref 150–400)
POTASSIUM SERPL-SCNC: 4.5 MMOL/L (ref 3.5–5.1)
PROT SERPL-MCNC: 8 G/DL (ref 6.4–8.2)
RBC # BLD AUTO: 4.05 M/UL (ref 4.2–5.4)
SODIUM SERPL-SCNC: 139 MMOL/L (ref 136–145)
TRIGL SERPL-MCNC: 126 MG/DL (ref 35–150)
TSH SERPL DL<=0.005 MIU/L-ACNC: 2.02 UIU/ML (ref 0.36–3.74)
VLDLC SERPL-MCNC: 25 MG/DL
WBC # BLD AUTO: 5.86 K/UL (ref 4.5–11)

## 2022-04-11 NOTE — PROGRESS NOTES
Clinic note     Patient name: Dari Valverde is a 67 y.o. female   Chief compliant   Chief Complaint   Patient presents with    Medication Refill    Hand Pain     4th finger right hand the last couple of weeks.        Subjective     History of present illness   In clinic for routine follow up, denies any acute concerns at present   Hx of DM, checking blood glucose daily with am readings 120's and pm readings 160's  Last A1c was 6.6  Blood pressure elevated in clinic, she monitors blood pressure at home and reports reading this morning of 133/70  She reports some pain to right hand for two weeks, denies any known injury, declines any imaging or further evaluation at this time; states she will consider in future if symptoms persist        Social History     Tobacco Use    Smoking status: Never Smoker    Smokeless tobacco: Never Used   Substance Use Topics    Alcohol use: Not Currently    Drug use: Never       Review of patient's allergies indicates:   Allergen Reactions    Lisinopril      Causes cough        Past Medical History:   Diagnosis Date    Diabetes     Hyperlipemia     Hypertension        Past Surgical History:   Procedure Laterality Date     SECTION      CHOLECYSTECTOMY          Family History   Problem Relation Age of Onset    Dementia Mother     Heart disease Father          Current Outpatient Medications:     blood sugar diagnostic Strp, Prodigy test strips, monitor blood glucose daily and as needed, Disp: 30 strip, Rfl: 5    amLODIPine (NORVASC) 10 MG tablet, Take 1 tablet (10 mg total) by mouth once daily., Disp: 90 tablet, Rfl: 1    atorvastatin (LIPITOR) 40 MG tablet, Take 1 tablet (40 mg total) by mouth once daily., Disp: 90 tablet, Rfl: 1    losartan (COZAAR) 25 MG tablet, Take 1 tablet (25 mg total) by mouth once daily., Disp: 90 tablet, Rfl: 1    metFORMIN (GLUCOPHAGE) 500 MG tablet, Take 1 tablet (500 mg total) by mouth 2 (two) times daily., Disp: 180 tablet, Rfl:  "1    Review of Systems   Constitutional: Negative for appetite change, chills, fatigue, fever and unexpected weight change.   Eyes: Negative for visual disturbance.   Respiratory: Negative for cough and shortness of breath.    Cardiovascular: Negative for chest pain, palpitations and leg swelling.   Gastrointestinal: Negative for abdominal pain, change in bowel habit, constipation, diarrhea, nausea, vomiting and change in bowel habit.   Endocrine: Negative for polydipsia and polyuria.   Genitourinary: Negative for dysuria.   Musculoskeletal: Positive for arthralgias. Negative for gait problem and myalgias.   Integumentary:  Negative for wound.   Neurological: Negative for dizziness, syncope, light-headedness and headaches.   Psychiatric/Behavioral: Negative for confusion, dysphoric mood and sleep disturbance. The patient is not nervous/anxious.        Objective     BP (!) 171/86   Pulse 92   Temp 97.3 °F (36.3 °C)   Ht 5' 2" (1.575 m)   Wt 79.8 kg (176 lb)   SpO2 99%   BMI 32.19 kg/m²     Physical Exam   Constitutional: She is oriented to person, place, and time. No distress.   HENT:   Head: Normocephalic.   Mouth/Throat: Mucous membranes are moist.   Eyes: Pupils are equal, round, and reactive to light. Conjunctivae are normal.   Cardiovascular: Normal rate and regular rhythm.   Pulmonary/Chest: Effort normal and breath sounds normal. No respiratory distress. She has no wheezes. She has no rhonchi. She has no rales.   Abdominal: Soft. Bowel sounds are normal. She exhibits no distension. There is no abdominal tenderness.   Musculoskeletal:         General: Normal range of motion.      Cervical back: Normal range of motion and neck supple.      Right lower leg: No edema.      Left lower leg: No edema.   Neurological: She is alert and oriented to person, place, and time. Gait normal.   Skin: Skin is warm and dry.   Psychiatric: Her behavior is normal. Mood normal.       Lab Results   Component Value Date    WBC " 5.86 04/05/2022    HGB 11.7 (L) 04/05/2022    HCT 36.3 (L) 04/05/2022    MCV 89.6 04/05/2022     04/05/2022       CMP  Sodium   Date Value Ref Range Status   04/05/2022 139 136 - 145 mmol/L Final     Potassium   Date Value Ref Range Status   04/05/2022 4.5 3.5 - 5.1 mmol/L Final     Chloride   Date Value Ref Range Status   04/05/2022 108 (H) 98 - 107 mmol/L Final     CO2   Date Value Ref Range Status   04/05/2022 27 21 - 32 mmol/L Final     Glucose   Date Value Ref Range Status   04/05/2022 93 74 - 106 mg/dL Final     BUN   Date Value Ref Range Status   04/05/2022 8 7 - 18 mg/dL Final     Creatinine   Date Value Ref Range Status   04/05/2022 0.76 0.55 - 1.02 mg/dL Final     Calcium   Date Value Ref Range Status   04/05/2022 9.6 8.5 - 10.1 mg/dL Final     Total Protein   Date Value Ref Range Status   04/05/2022 8.0 6.4 - 8.2 g/dL Final     Albumin   Date Value Ref Range Status   04/05/2022 3.8 3.5 - 5.0 g/dL Final     Bilirubin, Total   Date Value Ref Range Status   04/05/2022 0.2 0.0 - 1.2 mg/dL Final     Alk Phos   Date Value Ref Range Status   04/05/2022 92 55 - 142 U/L Final     AST   Date Value Ref Range Status   04/05/2022 10 (L) 15 - 37 U/L Final     ALT   Date Value Ref Range Status   04/05/2022 28 13 - 56 U/L Final     Anion Gap   Date Value Ref Range Status   04/05/2022 9 7 - 16 mmol/L Final     eGFR   Date Value Ref Range Status   04/05/2022 80 >=60 mL/min/1.73m² Final     Lab Results   Component Value Date    TSH 2.020 04/05/2022     Lab Results   Component Value Date    CHOL 139 04/05/2022    CHOL 232 (H) 11/04/2021     Lab Results   Component Value Date    HDL 70 (H) 04/05/2022    HDL 67 (H) 11/04/2021     Lab Results   Component Value Date    LDLCALC 44 04/05/2022    LDLCALC 128 11/04/2021     Lab Results   Component Value Date    TRIG 126 04/05/2022    TRIG 186 (H) 11/04/2021     Lab Results   Component Value Date    CHOLHDL 2.0 04/05/2022    CHOLHDL 3.5 11/04/2021     Lab Results   Component  Value Date    HGBA1C 6.4 04/05/2022         Assessment and Plan   Essential hypertension  -     CBC Auto Differential; Future; Expected date: 04/05/2022  -     Comprehensive Metabolic Panel; Future; Expected date: 04/05/2022  -     losartan (COZAAR) 25 MG tablet; Take 1 tablet (25 mg total) by mouth once daily.  Dispense: 90 tablet; Refill: 1  -     amLODIPine (NORVASC) 10 MG tablet; Take 1 tablet (10 mg total) by mouth once daily.  Dispense: 90 tablet; Refill: 1    Type 2 diabetes mellitus without complication, without long-term current use of insulin  -     Hemoglobin A1C; Future; Expected date: 04/05/2022  -     metFORMIN (GLUCOPHAGE) 500 MG tablet; Take 1 tablet (500 mg total) by mouth 2 (two) times daily.  Dispense: 180 tablet; Refill: 1    Hyperlipidemia, unspecified hyperlipidemia type  -     Lipid Panel; Future; Expected date: 04/05/2022  -     atorvastatin (LIPITOR) 40 MG tablet; Take 1 tablet (40 mg total) by mouth once daily.  Dispense: 90 tablet; Refill: 1    Screening for hypothyroidism  -     TSH; Future; Expected date: 04/05/2022          Patient Instructions  Patient Instructions   Lab obtained in clinic today, we will notify you of results and any necessary changes to plan of care     Refills on routine medications   You can contact clinic if you decide to proceed with Moderna COVID vaccine booster dose   Blood pressure log and bring in to all clinic visits   Follow up in 6 months and as needed

## 2022-07-20 LAB
LEFT EYE DM RETINOPATHY: NEGATIVE
RIGHT EYE DM RETINOPATHY: NEGATIVE

## 2022-11-01 ENCOUNTER — OFFICE VISIT (OUTPATIENT)
Dept: FAMILY MEDICINE | Facility: CLINIC | Age: 67
End: 2022-11-01
Payer: COMMERCIAL

## 2022-11-01 VITALS
OXYGEN SATURATION: 98 % | SYSTOLIC BLOOD PRESSURE: 150 MMHG | DIASTOLIC BLOOD PRESSURE: 83 MMHG | BODY MASS INDEX: 32.76 KG/M2 | HEART RATE: 84 BPM | WEIGHT: 178 LBS | HEIGHT: 62 IN | TEMPERATURE: 98 F

## 2022-11-01 DIAGNOSIS — I10 ESSENTIAL HYPERTENSION: ICD-10-CM

## 2022-11-01 DIAGNOSIS — E11.9 TYPE 2 DIABETES MELLITUS WITHOUT COMPLICATION, WITHOUT LONG-TERM CURRENT USE OF INSULIN: Primary | ICD-10-CM

## 2022-11-01 DIAGNOSIS — E78.5 HYPERLIPIDEMIA, UNSPECIFIED HYPERLIPIDEMIA TYPE: ICD-10-CM

## 2022-11-01 DIAGNOSIS — M65.30 TRIGGER FINGER OF RIGHT HAND, UNSPECIFIED FINGER: ICD-10-CM

## 2022-11-01 LAB
CREAT UR-MCNC: 83 MG/DL (ref 28–219)
EST. AVERAGE GLUCOSE BLD GHB EST-MCNC: 134 MG/DL
HBA1C MFR BLD HPLC: 6.6 % (ref 4.5–6.6)
MICROALBUMIN UR-MCNC: 0.9 MG/DL (ref 0–2.8)
MICROALBUMIN/CREAT RATIO PNL UR: 10.8 MG/G (ref 0–30)

## 2022-11-01 PROCEDURE — 1159F PR MEDICATION LIST DOCUMENTED IN MEDICAL RECORD: ICD-10-PCS | Mod: ,,, | Performed by: NURSE PRACTITIONER

## 2022-11-01 PROCEDURE — 3288F FALL RISK ASSESSMENT DOCD: CPT | Mod: ,,, | Performed by: NURSE PRACTITIONER

## 2022-11-01 PROCEDURE — 83036 HEMOGLOBIN A1C: ICD-10-PCS | Mod: ,,, | Performed by: CLINICAL MEDICAL LABORATORY

## 2022-11-01 PROCEDURE — 99213 OFFICE O/P EST LOW 20 MIN: CPT | Mod: ,,, | Performed by: NURSE PRACTITIONER

## 2022-11-01 PROCEDURE — 82043 MICROALBUMIN / CREATININE RATIO URINE: ICD-10-PCS | Mod: ,,, | Performed by: CLINICAL MEDICAL LABORATORY

## 2022-11-01 PROCEDURE — 1160F RVW MEDS BY RX/DR IN RCRD: CPT | Mod: ,,, | Performed by: NURSE PRACTITIONER

## 2022-11-01 PROCEDURE — 82043 UR ALBUMIN QUANTITATIVE: CPT | Mod: ,,, | Performed by: CLINICAL MEDICAL LABORATORY

## 2022-11-01 PROCEDURE — 3077F PR MOST RECENT SYSTOLIC BLOOD PRESSURE >= 140 MM HG: ICD-10-PCS | Mod: ,,, | Performed by: NURSE PRACTITIONER

## 2022-11-01 PROCEDURE — 1126F PR PAIN SEVERITY QUANTIFIED, NO PAIN PRESENT: ICD-10-PCS | Mod: ,,, | Performed by: NURSE PRACTITIONER

## 2022-11-01 PROCEDURE — 1101F PR PT FALLS ASSESS DOC 0-1 FALLS W/OUT INJ PAST YR: ICD-10-PCS | Mod: ,,, | Performed by: NURSE PRACTITIONER

## 2022-11-01 PROCEDURE — 4010F ACE/ARB THERAPY RXD/TAKEN: CPT | Mod: ,,, | Performed by: NURSE PRACTITIONER

## 2022-11-01 PROCEDURE — 1160F PR REVIEW ALL MEDS BY PRESCRIBER/CLIN PHARMACIST DOCUMENTED: ICD-10-PCS | Mod: ,,, | Performed by: NURSE PRACTITIONER

## 2022-11-01 PROCEDURE — 83036 HEMOGLOBIN GLYCOSYLATED A1C: CPT | Mod: ,,, | Performed by: CLINICAL MEDICAL LABORATORY

## 2022-11-01 PROCEDURE — 1101F PT FALLS ASSESS-DOCD LE1/YR: CPT | Mod: ,,, | Performed by: NURSE PRACTITIONER

## 2022-11-01 PROCEDURE — 3077F SYST BP >= 140 MM HG: CPT | Mod: ,,, | Performed by: NURSE PRACTITIONER

## 2022-11-01 PROCEDURE — 3288F PR FALLS RISK ASSESSMENT DOCUMENTED: ICD-10-PCS | Mod: ,,, | Performed by: NURSE PRACTITIONER

## 2022-11-01 PROCEDURE — 4010F PR ACE/ARB THEARPY RXD/TAKEN: ICD-10-PCS | Mod: ,,, | Performed by: NURSE PRACTITIONER

## 2022-11-01 PROCEDURE — 3044F PR MOST RECENT HEMOGLOBIN A1C LEVEL <7.0%: ICD-10-PCS | Mod: ,,, | Performed by: NURSE PRACTITIONER

## 2022-11-01 PROCEDURE — 1126F AMNT PAIN NOTED NONE PRSNT: CPT | Mod: ,,, | Performed by: NURSE PRACTITIONER

## 2022-11-01 PROCEDURE — 3044F HG A1C LEVEL LT 7.0%: CPT | Mod: ,,, | Performed by: NURSE PRACTITIONER

## 2022-11-01 PROCEDURE — 3079F DIAST BP 80-89 MM HG: CPT | Mod: ,,, | Performed by: NURSE PRACTITIONER

## 2022-11-01 PROCEDURE — 99213 PR OFFICE/OUTPT VISIT, EST, LEVL III, 20-29 MIN: ICD-10-PCS | Mod: ,,, | Performed by: NURSE PRACTITIONER

## 2022-11-01 PROCEDURE — 1159F MED LIST DOCD IN RCRD: CPT | Mod: ,,, | Performed by: NURSE PRACTITIONER

## 2022-11-01 PROCEDURE — 82570 ASSAY OF URINE CREATININE: CPT | Mod: ,,, | Performed by: CLINICAL MEDICAL LABORATORY

## 2022-11-01 PROCEDURE — 82570 MICROALBUMIN / CREATININE RATIO URINE: ICD-10-PCS | Mod: ,,, | Performed by: CLINICAL MEDICAL LABORATORY

## 2022-11-01 PROCEDURE — 3079F PR MOST RECENT DIASTOLIC BLOOD PRESSURE 80-89 MM HG: ICD-10-PCS | Mod: ,,, | Performed by: NURSE PRACTITIONER

## 2022-11-01 RX ORDER — ATORVASTATIN CALCIUM 40 MG/1
40 TABLET, FILM COATED ORAL DAILY
Qty: 90 TABLET | Refills: 1 | Status: SHIPPED | OUTPATIENT
Start: 2022-11-01 | End: 2023-06-20 | Stop reason: SDUPTHER

## 2022-11-01 RX ORDER — METFORMIN HYDROCHLORIDE 500 MG/1
500 TABLET ORAL 2 TIMES DAILY
Qty: 180 TABLET | Refills: 1 | Status: SHIPPED | OUTPATIENT
Start: 2022-11-01 | End: 2023-06-20 | Stop reason: SDUPTHER

## 2022-11-01 RX ORDER — AMLODIPINE BESYLATE 10 MG/1
10 TABLET ORAL DAILY
Qty: 90 TABLET | Refills: 1 | Status: SHIPPED | OUTPATIENT
Start: 2022-11-01 | End: 2023-06-20 | Stop reason: SDUPTHER

## 2022-11-01 RX ORDER — LOSARTAN POTASSIUM 25 MG/1
25 TABLET ORAL DAILY
Qty: 90 TABLET | Refills: 1 | Status: SHIPPED | OUTPATIENT
Start: 2022-11-01 | End: 2023-06-20 | Stop reason: SDUPTHER

## 2022-11-01 NOTE — PATIENT INSTRUCTIONS
Lab obtained in clinic today, we will notify you of results and any necessary changes to plan of care   Refills on routine medications   Follow up in six months and as needed

## 2022-11-01 NOTE — PROGRESS NOTES
Clinic note     Patient name: Dair Valverde is a 67 y.o. female   Chief compliant   Chief Complaint   Patient presents with    Medication Refill     No problems at this time.        Subjective     History of present illness   In clinic for routine follow up, denies any acute concerns at present   Hx of DM, checking blood glucose BID  with yesterday am reading 119  and pm reading 154  Last A1c was 6.4, this will be rechecked today   Blood pressure elevated in clinic, she monitors blood pressure at home and reports reading this morning of 134/73 using wrist blood pressure monitor  Denies any chest pain, shortness of breath, dizziness, palpitations or headache  She reports ongoing intermittent locking and pain to fourth finger on right hand, discussed use of OTC Voltaren gel and ibuprofen; she declines referral at this time        Social History     Tobacco Use    Smoking status: Never    Smokeless tobacco: Never   Substance Use Topics    Alcohol use: Never    Drug use: Never       Review of patient's allergies indicates:   Allergen Reactions    Lisinopril      Causes cough        Past Medical History:   Diagnosis Date    Diabetes     Hyperlipemia     Hypertension        Past Surgical History:   Procedure Laterality Date     SECTION      CHOLECYSTECTOMY      TUBAL LIGATION          Family History   Problem Relation Age of Onset    Dementia Mother     Heart disease Father     Stroke Maternal Grandmother          Current Outpatient Medications:     amLODIPine (NORVASC) 10 MG tablet, Take 1 tablet (10 mg total) by mouth once daily., Disp: 90 tablet, Rfl: 1    atorvastatin (LIPITOR) 40 MG tablet, Take 1 tablet (40 mg total) by mouth once daily., Disp: 90 tablet, Rfl: 1    blood sugar diagnostic Strp, Prodigy test strips, monitor blood glucose daily and as needed, Disp: 200 strip, Rfl: 3    losartan (COZAAR) 25 MG tablet, Take 1 tablet (25 mg total) by mouth once daily., Disp: 90 tablet, Rfl: 1    metFORMIN  "(GLUCOPHAGE) 500 MG tablet, Take 1 tablet (500 mg total) by mouth 2 (two) times daily., Disp: 180 tablet, Rfl: 1    Review of Systems   Constitutional:  Negative for activity change, appetite change, chills, fatigue, fever and unexpected weight change.   HENT:  Negative for hearing loss, rhinorrhea and trouble swallowing.    Eyes:  Negative for discharge and visual disturbance.   Respiratory:  Negative for cough, chest tightness, shortness of breath and wheezing.    Cardiovascular:  Negative for chest pain, palpitations and leg swelling.   Gastrointestinal:  Negative for abdominal pain, blood in stool, change in bowel habit, constipation, diarrhea, nausea, vomiting and change in bowel habit.   Endocrine: Negative for polydipsia and polyuria.   Genitourinary:  Negative for difficulty urinating, dysuria, hematuria and menstrual problem.   Musculoskeletal:  Negative for arthralgias, gait problem, joint swelling, myalgias and neck pain.   Integumentary:  Negative for wound.   Neurological:  Negative for dizziness, syncope, weakness, light-headedness and headaches.   Psychiatric/Behavioral:  Negative for confusion, dysphoric mood and sleep disturbance.      Objective     BP (!) 150/83   Pulse 84   Temp 97.8 °F (36.6 °C)   Ht 5' 2" (1.575 m)   Wt 80.7 kg (178 lb)   SpO2 98%   BMI 32.56 kg/m²     Physical Exam   Constitutional: She is oriented to person, place, and time. No distress.   HENT:   Head: Atraumatic.   Mouth/Throat: Mucous membranes are moist.   Eyes: Pupils are equal, round, and reactive to light. Conjunctivae are normal.   Cardiovascular: Normal rate and regular rhythm. Pulmonary:      Effort: Pulmonary effort is normal. No respiratory distress.      Breath sounds: Normal breath sounds. No wheezing, rhonchi or rales.     Abdominal: Soft. Bowel sounds are normal. She exhibits no distension. There is no abdominal tenderness.   Musculoskeletal:         General: Normal range of motion.      Cervical back: " Neck supple.      Right lower leg: No edema.      Left lower leg: No edema.      Comments: Tenderness to right hand palm   Neurological: She is alert and oriented to person, place, and time. Gait normal.   Skin: Skin is warm and dry.   Psychiatric: Her behavior is normal. Mood normal.     Lab Results   Component Value Date    WBC 5.86 04/05/2022    HGB 11.7 (L) 04/05/2022    HCT 36.3 (L) 04/05/2022    MCV 89.6 04/05/2022     04/05/2022       CMP  Sodium   Date Value Ref Range Status   04/05/2022 139 136 - 145 mmol/L Final     Potassium   Date Value Ref Range Status   04/05/2022 4.5 3.5 - 5.1 mmol/L Final     Chloride   Date Value Ref Range Status   04/05/2022 108 (H) 98 - 107 mmol/L Final     CO2   Date Value Ref Range Status   04/05/2022 27 21 - 32 mmol/L Final     Glucose   Date Value Ref Range Status   04/05/2022 93 74 - 106 mg/dL Final     BUN   Date Value Ref Range Status   04/05/2022 8 7 - 18 mg/dL Final     Creatinine   Date Value Ref Range Status   04/05/2022 0.76 0.55 - 1.02 mg/dL Final     Calcium   Date Value Ref Range Status   04/05/2022 9.6 8.5 - 10.1 mg/dL Final     Total Protein   Date Value Ref Range Status   04/05/2022 8.0 6.4 - 8.2 g/dL Final     Albumin   Date Value Ref Range Status   04/05/2022 3.8 3.5 - 5.0 g/dL Final     Bilirubin, Total   Date Value Ref Range Status   04/05/2022 0.2 0.0 - 1.2 mg/dL Final     Alk Phos   Date Value Ref Range Status   04/05/2022 92 55 - 142 U/L Final     AST   Date Value Ref Range Status   04/05/2022 10 (L) 15 - 37 U/L Final     ALT   Date Value Ref Range Status   04/05/2022 28 13 - 56 U/L Final     Anion Gap   Date Value Ref Range Status   04/05/2022 9 7 - 16 mmol/L Final     eGFR   Date Value Ref Range Status   04/05/2022 80 >=60 mL/min/1.73m² Final     Lab Results   Component Value Date    TSH 2.020 04/05/2022     Lab Results   Component Value Date    CHOL 139 04/05/2022    CHOL 232 (H) 11/04/2021     Lab Results   Component Value Date    HDL 70 (H)  04/05/2022    HDL 67 (H) 11/04/2021     Lab Results   Component Value Date    LDLCALC 44 04/05/2022    LDLCALC 128 11/04/2021     Lab Results   Component Value Date    TRIG 126 04/05/2022    TRIG 186 (H) 11/04/2021     Lab Results   Component Value Date    CHOLHDL 2.0 04/05/2022    CHOLHDL 3.5 11/04/2021     Lab Results   Component Value Date    HGBA1C 6.4 04/05/2022         Assessment and Plan   Type 2 diabetes mellitus without complication, without long-term current use of insulin  -     Hemoglobin A1C; Future; Expected date: 11/01/2022  -     blood sugar diagnostic Strp; Prodigy test strips, monitor blood glucose daily and as needed  Dispense: 200 strip; Refill: 3  -     metFORMIN (GLUCOPHAGE) 500 MG tablet; Take 1 tablet (500 mg total) by mouth 2 (two) times daily.  Dispense: 180 tablet; Refill: 1  -     Microalbumin/Creatinine Ratio, Urine; Future; Expected date: 11/01/2022    Essential hypertension  -     amLODIPine (NORVASC) 10 MG tablet; Take 1 tablet (10 mg total) by mouth once daily.  Dispense: 90 tablet; Refill: 1  -     losartan (COZAAR) 25 MG tablet; Take 1 tablet (25 mg total) by mouth once daily.  Dispense: 90 tablet; Refill: 1    Hyperlipidemia, unspecified hyperlipidemia type  -     atorvastatin (LIPITOR) 40 MG tablet; Take 1 tablet (40 mg total) by mouth once daily.  Dispense: 90 tablet; Refill: 1    BMI 32.0-32.9,adult    Trigger finger of right hand, unspecified finger        Patient Instructions  Patient Instructions   Lab obtained in clinic today, we will notify you of results and any necessary changes to plan of care   Refills on routine medications   Follow up in six months and as needed

## 2022-11-08 NOTE — PROGRESS NOTES
[facility] AWV [facility]      PATIENT NAME: Dari Valverde   : 1955    AGE: 67 y.o. DATE: 11/10/2022   MRN: [id number]        Reason for Visit / Chief Complaint: Medicare AWV (Subsequent [employer] AWV )        Dari Valverde presents for a Subsequent [employer] AWV today.     The following components were reviewed and updated:    Medical/Social/Family History:  Past Medical History:   Diagnosis Date    Diabetes     Hyperlipemia     Hypertension         Family History   Problem Relation Age of Onset    Dementia Mother     Heart disease Father     Stroke Maternal Grandmother         Past Surgical History:   Procedure Laterality Date     SECTION      CHOLECYSTECTOMY      TUBAL LIGATION         Social History     Tobacco Use   Smoking Status Never   Smokeless Tobacco Never       Social History     Substance and Sexual Activity   Alcohol Use Never         Allergies and Current Medications     Review of patient's allergies indicates:   Allergen Reactions    Lisinopril      Causes cough        Current Outpatient Medications:     amLODIPine (NORVASC) 10 MG tablet, Take 1 tablet (10 mg total) by mouth once daily., Disp: 90 tablet, Rfl: 1    atorvastatin (LIPITOR) 40 MG tablet, Take 1 tablet (40 mg total) by mouth once daily., Disp: 90 tablet, Rfl: 1    blood sugar diagnostic Strp, Prodigy test strips, monitor blood glucose daily and as needed, Disp: 200 strip, Rfl: 3    losartan (COZAAR) 25 MG tablet, Take 1 tablet (25 mg total) by mouth once daily., Disp: 90 tablet, Rfl: 1    metFORMIN (GLUCOPHAGE) 500 MG tablet, Take 1 tablet (500 mg total) by mouth 2 (two) times daily., Disp: 180 tablet, Rfl: 1      Health Risk Assessment   Fall Risk: no   Obesity: BMI Body mass index is 31.46 kg/m².   Advance Directive:  Does not have an advanced directive.  Verbal information given and written information provided on today's AVS.   Depression: PHQ9- 0   HTN: DASH diet, exercise, weight management,  med compliance, home BP monitoring, and follow-up discussed.   T2DM: diabetic diet, glucose monitoring, activity level, weight management, med compliance, and follow-up discussed.  STI: not at risk   Statin Use: yes      Health Maintenance   Last eye exam: 3 months ago, will request report   Last CV screen with lipids: 04/05/2022   Diabetes screening with fasting glucose or A1c: 11/01/2022   Last pap/pelvic: advanced age   Last Mammogram: 01/13/2022  DEXA: has not had, declines referral   Colonoscopy: cologuard 11/23/2021   Flu Vaccine: [facility]   Pneumonia vaccines: declines   COVID vaccine: 02/12/2021  03/12/2021  10/26/2021  04/09/2022   Hep B vaccine: NA  AAA screening: NA   HIV Screening: not high risk  Hepatitis C Screen: 11/04/2021  Low Dose CT Scan: NA    Health Maintenance Topics with due status: Not Due       Topic Last Completion Date    Colorectal Cancer Screening 11/23/2021    Mammogram 01/13/2022    Lipid Panel 04/05/2022    Diabetes Urine Screening 11/01/2022    Hemoglobin A1c 11/01/2022    Low Dose Statin 11/10/2022     Health Maintenance Due   Topic Date Due    Pneumococcal Vaccines (Age 65+) (1 - PCV) Never done    Foot Exam  Never done    TETANUS VACCINE  Never done    DEXA Scan  Never done    Shingles Vaccine (1 of 2) Never done    Eye Exam  01/15/2021    COVID-19 Vaccine (5 - Booster for Moderna series) 06/04/2022         Lab results available in Epic or see dates from [facility] above:   Lab Results   Component Value Date    CHOL 139 04/05/2022    CHOL 232 (H) 11/04/2021     Lab Results   Component Value Date    HDL 70 (H) 04/05/2022    HDL 67 (H) 11/04/2021     Lab Results   Component Value Date    LDLCALC 44 04/05/2022    LDLCALC 128 11/04/2021     Lab Results   Component Value Date    TRIG 126 04/05/2022    TRIG 186 (H) 11/04/2021         Lab Results   Component Value Date    HGBA1C 6.6 11/01/2022       Sodium   Date Value Ref Range Status   04/05/2022 139 136 - 145 mmol/L Final  "    Potassium   Date Value Ref Range Status   04/05/2022 4.5 3.5 - 5.1 mmol/L Final     Chloride   Date Value Ref Range Status   04/05/2022 108 (H) 98 - 107 mmol/L Final     CO2   Date Value Ref Range Status   04/05/2022 27 21 - 32 mmol/L Final     Glucose   Date Value Ref Range Status   04/05/2022 93 74 - 106 mg/dL Final     BUN   Date Value Ref Range Status   04/05/2022 8 7 - 18 mg/dL Final     Creatinine   Date Value Ref Range Status   04/05/2022 0.76 0.55 - 1.02 mg/dL Final     Calcium   Date Value Ref Range Status   04/05/2022 9.6 8.5 - 10.1 mg/dL Final     Total Protein   Date Value Ref Range Status   04/05/2022 8.0 6.4 - 8.2 g/dL Final     Albumin   Date Value Ref Range Status   04/05/2022 3.8 3.5 - 5.0 g/dL Final     Bilirubin, Total   Date Value Ref Range Status   04/05/2022 0.2 0.0 - 1.2 mg/dL Final     Alk Phos   Date Value Ref Range Status   04/05/2022 92 55 - 142 U/L Final     AST   Date Value Ref Range Status   04/05/2022 10 (L) 15 - 37 U/L Final     ALT   Date Value Ref Range Status   04/05/2022 28 13 - 56 U/L Final     Anion Gap   Date Value Ref Range Status   04/05/2022 9 7 - 16 mmol/L Final     eGFR   Date Value Ref Range Status   04/05/2022 80 >=60 mL/min/1.73m² Final         Incontinence  Bowel: no  Bladder: no      Care Team:  Dr. Altamirano  Optometry          **See Completed Assessments for Annual Wellness visit within the encounter summary    The following assessments were completed & reviewed:  Depression Screening  Cognitive function Screening  Timed Get Up Test  Whisper Test  Vision Screen  Health Risk Assessment  Checklist of ADLs and IADLs  Opioid Risk Assessment        Objective  Vitals:    11/10/22 1522 11/10/22 1539   BP: (!) 152/78 (!) 152/82   Pulse: 78    Resp: 13    Temp: 98.3 °F (36.8 °C)    TempSrc: Oral    SpO2: 98%    Weight: 78 kg (172 lb)    Height: 5' 2" (1.575 m)    PainSc: 0-No pain       Body mass index is 31.46 kg/m².  Ideal body weight: 50.1 kg (110 lb 7.2 oz) "       Physical Exam  Constitutional:       General: She is not in acute distress.  HENT:      Head: Atraumatic.      Mouth/Throat:      Mouth: Mucous membranes are moist.   Cardiovascular:      Rate and Rhythm: Normal rate and regular rhythm.   Pulmonary:      Effort: Pulmonary effort is normal.      Breath sounds: Normal breath sounds. No wheezing, rhonchi or rales.   Abdominal:      Tenderness: There is no abdominal tenderness.   Musculoskeletal:         General: Normal range of motion.      Cervical back: Neck supple.      Right lower leg: No edema.      Left lower leg: No edema.   Skin:     General: Skin is warm and dry.   Neurological:      General: No focal deficit present.      Mental Status: She is alert and oriented to person, place, and time.      Gait: Gait normal.   Psychiatric:         Mood and Affect: Mood normal.         Behavior: Behavior normal.         Assessment:     1. Encounter for subsequent annual wellness visit (AWV) in Medicare patient    2. Essential hypertension    3. Type 2 diabetes mellitus without complication, without long-term current use of insulin    4. Hyperlipidemia, unspecified hyperlipidemia type    5. BMI 31.0-31.9,adult       Problem List Items Addressed This Visit          Cardiac/Vascular    Essential hypertension    Hyperlipidemia       Endocrine    Type 2 diabetes mellitus without complication, without long-term current use of insulin     Other Visit Diagnoses       Encounter for subsequent annual wellness visit (AWV) in Medicare patient    -  Primary    BMI 31.0-31.9,adult                Plan:    Referrals:   No referrals at this time       Discussed and provided with a screening schedule and personal prevention plan in accordance with USPSTF age appropriate recommendations and Medicare screening guidelines.   Education, counseling, and referrals were provided as needed.  After Visit Summary printed and given to patient which includes written education and a list of any  referrals if indicated.     Education including diet, exercise, falls and advanced directives discussed with patient and patient verbalized understanding.      F/u plan for yearly AWV.    Signature: CORINE Ledezma-BC

## 2022-11-09 DIAGNOSIS — Z71.89 COMPLEX CARE COORDINATION: ICD-10-CM

## 2022-11-10 ENCOUNTER — OFFICE VISIT (OUTPATIENT)
Dept: FAMILY MEDICINE | Facility: CLINIC | Age: 67
End: 2022-11-10
Payer: COMMERCIAL

## 2022-11-10 VITALS
OXYGEN SATURATION: 98 % | TEMPERATURE: 98 F | BODY MASS INDEX: 31.65 KG/M2 | WEIGHT: 172 LBS | SYSTOLIC BLOOD PRESSURE: 152 MMHG | DIASTOLIC BLOOD PRESSURE: 82 MMHG | HEIGHT: 62 IN | RESPIRATION RATE: 13 BRPM | HEART RATE: 78 BPM

## 2022-11-10 DIAGNOSIS — Z00.00 ENCOUNTER FOR SUBSEQUENT ANNUAL WELLNESS VISIT (AWV) IN MEDICARE PATIENT: Primary | ICD-10-CM

## 2022-11-10 DIAGNOSIS — I10 ESSENTIAL HYPERTENSION: ICD-10-CM

## 2022-11-10 DIAGNOSIS — E11.9 TYPE 2 DIABETES MELLITUS WITHOUT COMPLICATION, WITHOUT LONG-TERM CURRENT USE OF INSULIN: ICD-10-CM

## 2022-11-10 DIAGNOSIS — E78.5 HYPERLIPIDEMIA, UNSPECIFIED HYPERLIPIDEMIA TYPE: ICD-10-CM

## 2022-11-10 PROCEDURE — 1160F RVW MEDS BY RX/DR IN RCRD: CPT | Mod: ,,, | Performed by: NURSE PRACTITIONER

## 2022-11-10 PROCEDURE — 1159F PR MEDICATION LIST DOCUMENTED IN MEDICAL RECORD: ICD-10-PCS | Mod: ,,, | Performed by: NURSE PRACTITIONER

## 2022-11-10 PROCEDURE — 1160F PR REVIEW ALL MEDS BY PRESCRIBER/CLIN PHARMACIST DOCUMENTED: ICD-10-PCS | Mod: ,,, | Performed by: NURSE PRACTITIONER

## 2022-11-10 PROCEDURE — 3061F NEG MICROALBUMINURIA REV: CPT | Mod: ,,, | Performed by: NURSE PRACTITIONER

## 2022-11-10 PROCEDURE — 1101F PT FALLS ASSESS-DOCD LE1/YR: CPT | Mod: ,,, | Performed by: NURSE PRACTITIONER

## 2022-11-10 PROCEDURE — 1159F MED LIST DOCD IN RCRD: CPT | Mod: ,,, | Performed by: NURSE PRACTITIONER

## 2022-11-10 PROCEDURE — 3044F PR MOST RECENT HEMOGLOBIN A1C LEVEL <7.0%: ICD-10-PCS | Mod: ,,, | Performed by: NURSE PRACTITIONER

## 2022-11-10 PROCEDURE — 1126F AMNT PAIN NOTED NONE PRSNT: CPT | Mod: ,,, | Performed by: NURSE PRACTITIONER

## 2022-11-10 PROCEDURE — 3044F HG A1C LEVEL LT 7.0%: CPT | Mod: ,,, | Performed by: NURSE PRACTITIONER

## 2022-11-10 PROCEDURE — 3077F SYST BP >= 140 MM HG: CPT | Mod: ,,, | Performed by: NURSE PRACTITIONER

## 2022-11-10 PROCEDURE — 3066F PR DOCUMENTATION OF TREATMENT FOR NEPHROPATHY: ICD-10-PCS | Mod: ,,, | Performed by: NURSE PRACTITIONER

## 2022-11-10 PROCEDURE — G0439 PPPS, SUBSEQ VISIT: HCPCS | Mod: ,,, | Performed by: NURSE PRACTITIONER

## 2022-11-10 PROCEDURE — 3066F NEPHROPATHY DOC TX: CPT | Mod: ,,, | Performed by: NURSE PRACTITIONER

## 2022-11-10 PROCEDURE — 4010F PR ACE/ARB THEARPY RXD/TAKEN: ICD-10-PCS | Mod: ,,, | Performed by: NURSE PRACTITIONER

## 2022-11-10 PROCEDURE — 3288F FALL RISK ASSESSMENT DOCD: CPT | Mod: ,,, | Performed by: NURSE PRACTITIONER

## 2022-11-10 PROCEDURE — 3288F PR FALLS RISK ASSESSMENT DOCUMENTED: ICD-10-PCS | Mod: ,,, | Performed by: NURSE PRACTITIONER

## 2022-11-10 PROCEDURE — 3008F BODY MASS INDEX DOCD: CPT | Mod: ,,, | Performed by: NURSE PRACTITIONER

## 2022-11-10 PROCEDURE — 3061F PR NEG MICROALBUMINURIA RESULT DOCUMENTED/REVIEW: ICD-10-PCS | Mod: ,,, | Performed by: NURSE PRACTITIONER

## 2022-11-10 PROCEDURE — 3008F PR BODY MASS INDEX (BMI) DOCUMENTED: ICD-10-PCS | Mod: ,,, | Performed by: NURSE PRACTITIONER

## 2022-11-10 PROCEDURE — 1126F PR PAIN SEVERITY QUANTIFIED, NO PAIN PRESENT: ICD-10-PCS | Mod: ,,, | Performed by: NURSE PRACTITIONER

## 2022-11-10 PROCEDURE — 3079F PR MOST RECENT DIASTOLIC BLOOD PRESSURE 80-89 MM HG: ICD-10-PCS | Mod: ,,, | Performed by: NURSE PRACTITIONER

## 2022-11-10 PROCEDURE — 3079F DIAST BP 80-89 MM HG: CPT | Mod: ,,, | Performed by: NURSE PRACTITIONER

## 2022-11-10 PROCEDURE — 1101F PR PT FALLS ASSESS DOC 0-1 FALLS W/OUT INJ PAST YR: ICD-10-PCS | Mod: ,,, | Performed by: NURSE PRACTITIONER

## 2022-11-10 PROCEDURE — 3077F PR MOST RECENT SYSTOLIC BLOOD PRESSURE >= 140 MM HG: ICD-10-PCS | Mod: ,,, | Performed by: NURSE PRACTITIONER

## 2022-11-10 PROCEDURE — 4010F ACE/ARB THERAPY RXD/TAKEN: CPT | Mod: ,,, | Performed by: NURSE PRACTITIONER

## 2022-11-10 PROCEDURE — G0439 PR MEDICARE ANNUAL WELLNESS SUBSEQUENT VISIT: ICD-10-PCS | Mod: ,,, | Performed by: NURSE PRACTITIONER

## 2022-11-10 NOTE — PATIENT INSTRUCTIONS
Counseling and Referral of Other Preventative  (Italic type indicates deductible and co-insurance are waived)    Patient Name: Dari Valverde  Today's Date: 11/10/2022    Health Maintenance         Date Due Completion Date    Foot Exam Never done ---    TETANUS VACCINE Never done ---    Shingles Vaccine (1 of 2) Never done ---    Eye Exam 01/15/2021 1/15/2020    COVID-19 Vaccine (5 - Booster for Moderna series) 06/04/2022 4/9/2022    DEXA Scan 11/10/2022 (Originally 2/3/1995) ---    Pneumococcal Vaccines (Age 65+) (1 - PCV) 11/10/2023 (Originally 2/3/1961) ---    Mammogram 01/13/2023 1/13/2022    Lipid Panel 04/05/2023 4/5/2022    Hemoglobin A1c 05/01/2023 11/1/2022    Diabetes Urine Screening 11/01/2023 11/1/2022    Low Dose Statin 11/10/2023 11/10/2022    Colorectal Cancer Screening 11/23/2024 11/23/2021          No orders of the defined types were placed in this encounter.

## 2023-01-19 ENCOUNTER — HOSPITAL ENCOUNTER (OUTPATIENT)
Dept: RADIOLOGY | Facility: HOSPITAL | Age: 68
Discharge: HOME | End: 2023-01-19
Payer: COMMERCIAL

## 2023-01-19 ENCOUNTER — PATIENT OUTREACH (OUTPATIENT)
Dept: FAMILY MEDICINE | Facility: CLINIC | Age: 68
End: 2023-01-19
Payer: COMMERCIAL

## 2023-01-19 DIAGNOSIS — Z12.31 BREAST CANCER SCREENING BY MAMMOGRAM: ICD-10-CM

## 2023-01-19 PROCEDURE — 77067 SCR MAMMO BI INCL CAD: CPT | Mod: TC

## 2023-06-09 DIAGNOSIS — Z71.89 COMPLEX CARE COORDINATION: ICD-10-CM

## 2023-06-20 NOTE — PROGRESS NOTES
Clinic note     Patient name: Dari Valverde is a 68 y.o. female   Chief compliant   Chief Complaint   Patient presents with    Medication Refill     Need a new BG meter that her current insurance will cover supplies for.        Subjective     History of present illness   In clinic for routine follow up, lab and medication refills  Denies any acute concerns at present   Request rx for new glucometer and testing supplies    Past Medical History: HTN, Type 2 DM, HLD    Last A1c 6.6, this will be rechecked today   Checking blood glucose:     LDL 44, on statin     DXA due: declines referral at this time, will consider at a later time and notify clinic when ready to proceed with referral, possible consequences of delaying screening discussed understanding verbalized      Colonoscopy: Alyce negative 2021  Mammogram: scheduled 2024          Social History     Tobacco Use    Smoking status: Never     Passive exposure: Never    Smokeless tobacco: Never   Substance Use Topics    Alcohol use: Never    Drug use: Never       Review of patient's allergies indicates:   Allergen Reactions    Lisinopril      Causes cough        Past Medical History:   Diagnosis Date    Diabetes     Hyperlipemia     Hypertension        Past Surgical History:   Procedure Laterality Date     SECTION      CHOLECYSTECTOMY      TUBAL LIGATION          Family History   Problem Relation Age of Onset    Dementia Mother     Heart disease Father     Stroke Maternal Grandmother          Current Outpatient Medications:     amLODIPine (NORVASC) 10 MG tablet, Take 1 tablet (10 mg total) by mouth once daily., Disp: 90 tablet, Rfl: 1    atorvastatin (LIPITOR) 40 MG tablet, Take 1 tablet (40 mg total) by mouth once daily., Disp: 90 tablet, Rfl: 1    blood sugar diagnostic Strp, Prodigy test strips, monitor blood glucose daily and as needed (Patient not taking: Reported on 2023), Disp: 200 strip, Rfl: 3    blood sugar diagnostic  "Strp, Use one test strip to check blood glucose BID, Disp: 200 strip, Rfl: 3    blood-glucose meter kit, Use as instructed, Disp: 1 each, Rfl: 0    losartan (COZAAR) 25 MG tablet, Take 1 tablet (25 mg total) by mouth once daily., Disp: 90 tablet, Rfl: 1    metFORMIN (GLUCOPHAGE) 500 MG tablet, Take 1 tablet (500 mg total) by mouth 2 (two) times daily., Disp: 180 tablet, Rfl: 1    Review of Systems   Constitutional:  Negative for appetite change, chills, fatigue, fever and unexpected weight change.   Respiratory:  Negative for cough and shortness of breath.    Cardiovascular:  Negative for chest pain, palpitations and leg swelling.   Gastrointestinal:  Negative for abdominal pain, change in bowel habit, constipation, diarrhea, nausea, vomiting and change in bowel habit.   Genitourinary:  Negative for dysuria and frequency.   Musculoskeletal:  Negative for arthralgias, gait problem and myalgias.   Neurological:  Negative for dizziness, syncope, light-headedness and headaches.   Psychiatric/Behavioral:  Negative for dysphoric mood and sleep disturbance. The patient is not nervous/anxious.      Objective     /83   Pulse 79   Ht 5' 2" (1.575 m)   Wt 77.7 kg (171 lb 4.8 oz)   SpO2 97%   BMI 31.33 kg/m²     Physical Exam   Constitutional: She is oriented to person, place, and time. No distress.   HENT:   Head: Atraumatic.   Mouth/Throat: Mucous membranes are moist.   Cardiovascular: Normal rate and regular rhythm. Pulmonary:      Effort: Pulmonary effort is normal. No respiratory distress.      Breath sounds: Normal breath sounds. No wheezing, rhonchi or rales.     Abdominal: Soft. Bowel sounds are normal. She exhibits no distension. There is no abdominal tenderness.   Musculoskeletal:         General: Normal range of motion.      Cervical back: Neck supple.      Right lower leg: No edema.      Left lower leg: No edema.   Neurological: She is alert and oriented to person, place, and time. Gait normal.   Skin: " Skin is warm and dry.   Psychiatric: Her behavior is normal. Mood normal.     Lab Results   Component Value Date    WBC 5.86 04/05/2022    HGB 11.7 (L) 04/05/2022    HCT 36.3 (L) 04/05/2022    MCV 89.6 04/05/2022     04/05/2022       CMP  Sodium   Date Value Ref Range Status   04/05/2022 139 136 - 145 mmol/L Final     Potassium   Date Value Ref Range Status   04/05/2022 4.5 3.5 - 5.1 mmol/L Final     Chloride   Date Value Ref Range Status   04/05/2022 108 (H) 98 - 107 mmol/L Final     CO2   Date Value Ref Range Status   04/05/2022 27 21 - 32 mmol/L Final     Glucose   Date Value Ref Range Status   04/05/2022 93 74 - 106 mg/dL Final     BUN   Date Value Ref Range Status   04/05/2022 8 7 - 18 mg/dL Final     Creatinine   Date Value Ref Range Status   04/05/2022 0.76 0.55 - 1.02 mg/dL Final     Calcium   Date Value Ref Range Status   04/05/2022 9.6 8.5 - 10.1 mg/dL Final     Total Protein   Date Value Ref Range Status   04/05/2022 8.0 6.4 - 8.2 g/dL Final     Albumin   Date Value Ref Range Status   04/05/2022 3.8 3.5 - 5.0 g/dL Final     Bilirubin, Total   Date Value Ref Range Status   04/05/2022 0.2 0.0 - 1.2 mg/dL Final     Alk Phos   Date Value Ref Range Status   04/05/2022 92 55 - 142 U/L Final     AST   Date Value Ref Range Status   04/05/2022 10 (L) 15 - 37 U/L Final     ALT   Date Value Ref Range Status   04/05/2022 28 13 - 56 U/L Final     Anion Gap   Date Value Ref Range Status   04/05/2022 9 7 - 16 mmol/L Final     eGFR   Date Value Ref Range Status   04/05/2022 80 >=60 mL/min/1.73m² Final     Lab Results   Component Value Date    TSH 2.020 04/05/2022     Lab Results   Component Value Date    CHOL 139 04/05/2022    CHOL 232 (H) 11/04/2021     Lab Results   Component Value Date    HDL 70 (H) 04/05/2022    HDL 67 (H) 11/04/2021     Lab Results   Component Value Date    LDLCALC 44 04/05/2022    LDLCALC 128 11/04/2021     Lab Results   Component Value Date    TRIG 126 04/05/2022    TRIG 186 (H) 11/04/2021      Lab Results   Component Value Date    CHOLHDL 2.0 04/05/2022    CHOLHDL 3.5 11/04/2021     Lab Results   Component Value Date    HGBA1C 6.6 11/01/2022         Assessment and Plan   Type 2 diabetes mellitus without complication, without long-term current use of insulin  -     Hemoglobin A1C; Future; Expected date: 06/21/2023  -     metFORMIN (GLUCOPHAGE) 500 MG tablet; Take 1 tablet (500 mg total) by mouth 2 (two) times daily.  Dispense: 180 tablet; Refill: 1  -     blood-glucose meter kit; Use as instructed  Dispense: 1 each; Refill: 0  -     blood sugar diagnostic Strp; Use one test strip to check blood glucose BID  Dispense: 200 strip; Refill: 3    Essential hypertension  -     Comprehensive Metabolic Panel; Future; Expected date: 06/21/2023  -     CBC Auto Differential; Future; Expected date: 06/21/2023  -     amLODIPine (NORVASC) 10 MG tablet; Take 1 tablet (10 mg total) by mouth once daily.  Dispense: 90 tablet; Refill: 1  -     losartan (COZAAR) 25 MG tablet; Take 1 tablet (25 mg total) by mouth once daily.  Dispense: 90 tablet; Refill: 1    Hyperlipidemia, unspecified hyperlipidemia type  -     Lipid Panel; Future; Expected date: 06/21/2023  -     atorvastatin (LIPITOR) 40 MG tablet; Take 1 tablet (40 mg total) by mouth once daily.  Dispense: 90 tablet; Refill: 1    Screening for hypothyroidism  -     TSH; Future; Expected date: 06/21/2023    BMI 31.0-31.9,adult          Patient Instructions  Patient Instructions   Lab obtained in clinic today, we will notify you of results and any necessary changes to plan of care   Refills on routine medications   Follow up in six months and as needed

## 2023-06-21 ENCOUNTER — OFFICE VISIT (OUTPATIENT)
Dept: FAMILY MEDICINE | Facility: CLINIC | Age: 68
End: 2023-06-21
Payer: COMMERCIAL

## 2023-06-21 VITALS
HEART RATE: 79 BPM | WEIGHT: 171.31 LBS | BODY MASS INDEX: 31.52 KG/M2 | HEIGHT: 62 IN | OXYGEN SATURATION: 97 % | SYSTOLIC BLOOD PRESSURE: 135 MMHG | DIASTOLIC BLOOD PRESSURE: 83 MMHG

## 2023-06-21 DIAGNOSIS — E11.9 TYPE 2 DIABETES MELLITUS WITHOUT COMPLICATION, WITHOUT LONG-TERM CURRENT USE OF INSULIN: Primary | ICD-10-CM

## 2023-06-21 DIAGNOSIS — I10 ESSENTIAL HYPERTENSION: ICD-10-CM

## 2023-06-21 DIAGNOSIS — E78.5 HYPERLIPIDEMIA, UNSPECIFIED HYPERLIPIDEMIA TYPE: ICD-10-CM

## 2023-06-21 DIAGNOSIS — Z13.29 SCREENING FOR HYPOTHYROIDISM: ICD-10-CM

## 2023-06-21 PROBLEM — Z78.0 POST-MENOPAUSAL: Status: ACTIVE | Noted: 2023-06-21

## 2023-06-21 PROCEDURE — 3075F PR MOST RECENT SYSTOLIC BLOOD PRESS GE 130-139MM HG: ICD-10-PCS | Mod: ,,, | Performed by: NURSE PRACTITIONER

## 2023-06-21 PROCEDURE — 84443 TSH: ICD-10-PCS | Mod: GZ,,, | Performed by: CLINICAL MEDICAL LABORATORY

## 2023-06-21 PROCEDURE — 1160F PR REVIEW ALL MEDS BY PRESCRIBER/CLIN PHARMACIST DOCUMENTED: ICD-10-PCS | Mod: ,,, | Performed by: NURSE PRACTITIONER

## 2023-06-21 PROCEDURE — 80061 LIPID PANEL: CPT | Mod: ,,, | Performed by: CLINICAL MEDICAL LABORATORY

## 2023-06-21 PROCEDURE — 80053 COMPREHEN METABOLIC PANEL: CPT | Mod: ,,, | Performed by: CLINICAL MEDICAL LABORATORY

## 2023-06-21 PROCEDURE — 83036 HEMOGLOBIN A1C: ICD-10-PCS | Mod: ,,, | Performed by: CLINICAL MEDICAL LABORATORY

## 2023-06-21 PROCEDURE — 84443 ASSAY THYROID STIM HORMONE: CPT | Mod: GZ,,, | Performed by: CLINICAL MEDICAL LABORATORY

## 2023-06-21 PROCEDURE — 85025 CBC WITH DIFFERENTIAL: ICD-10-PCS | Mod: ,,, | Performed by: CLINICAL MEDICAL LABORATORY

## 2023-06-21 PROCEDURE — 83036 HEMOGLOBIN GLYCOSYLATED A1C: CPT | Mod: ,,, | Performed by: CLINICAL MEDICAL LABORATORY

## 2023-06-21 PROCEDURE — 3288F PR FALLS RISK ASSESSMENT DOCUMENTED: ICD-10-PCS | Mod: ,,, | Performed by: NURSE PRACTITIONER

## 2023-06-21 PROCEDURE — 1126F PR PAIN SEVERITY QUANTIFIED, NO PAIN PRESENT: ICD-10-PCS | Mod: ,,, | Performed by: NURSE PRACTITIONER

## 2023-06-21 PROCEDURE — 1160F RVW MEDS BY RX/DR IN RCRD: CPT | Mod: ,,, | Performed by: NURSE PRACTITIONER

## 2023-06-21 PROCEDURE — 80061 LIPID PANEL: ICD-10-PCS | Mod: ,,, | Performed by: CLINICAL MEDICAL LABORATORY

## 2023-06-21 PROCEDURE — 3079F PR MOST RECENT DIASTOLIC BLOOD PRESSURE 80-89 MM HG: ICD-10-PCS | Mod: ,,, | Performed by: NURSE PRACTITIONER

## 2023-06-21 PROCEDURE — 4010F ACE/ARB THERAPY RXD/TAKEN: CPT | Mod: ,,, | Performed by: NURSE PRACTITIONER

## 2023-06-21 PROCEDURE — 3008F PR BODY MASS INDEX (BMI) DOCUMENTED: ICD-10-PCS | Mod: ,,, | Performed by: NURSE PRACTITIONER

## 2023-06-21 PROCEDURE — 3008F BODY MASS INDEX DOCD: CPT | Mod: ,,, | Performed by: NURSE PRACTITIONER

## 2023-06-21 PROCEDURE — 1101F PR PT FALLS ASSESS DOC 0-1 FALLS W/OUT INJ PAST YR: ICD-10-PCS | Mod: ,,, | Performed by: NURSE PRACTITIONER

## 2023-06-21 PROCEDURE — 3075F SYST BP GE 130 - 139MM HG: CPT | Mod: ,,, | Performed by: NURSE PRACTITIONER

## 2023-06-21 PROCEDURE — 1101F PT FALLS ASSESS-DOCD LE1/YR: CPT | Mod: ,,, | Performed by: NURSE PRACTITIONER

## 2023-06-21 PROCEDURE — 3079F DIAST BP 80-89 MM HG: CPT | Mod: ,,, | Performed by: NURSE PRACTITIONER

## 2023-06-21 PROCEDURE — 80053 COMPREHENSIVE METABOLIC PANEL: ICD-10-PCS | Mod: ,,, | Performed by: CLINICAL MEDICAL LABORATORY

## 2023-06-21 PROCEDURE — 99214 PR OFFICE/OUTPT VISIT, EST, LEVL IV, 30-39 MIN: ICD-10-PCS | Mod: ,,, | Performed by: NURSE PRACTITIONER

## 2023-06-21 PROCEDURE — 1159F MED LIST DOCD IN RCRD: CPT | Mod: ,,, | Performed by: NURSE PRACTITIONER

## 2023-06-21 PROCEDURE — 85025 COMPLETE CBC W/AUTO DIFF WBC: CPT | Mod: ,,, | Performed by: CLINICAL MEDICAL LABORATORY

## 2023-06-21 PROCEDURE — 4010F PR ACE/ARB THEARPY RXD/TAKEN: ICD-10-PCS | Mod: ,,, | Performed by: NURSE PRACTITIONER

## 2023-06-21 PROCEDURE — 99214 OFFICE O/P EST MOD 30 MIN: CPT | Mod: ,,, | Performed by: NURSE PRACTITIONER

## 2023-06-21 PROCEDURE — 1159F PR MEDICATION LIST DOCUMENTED IN MEDICAL RECORD: ICD-10-PCS | Mod: ,,, | Performed by: NURSE PRACTITIONER

## 2023-06-21 PROCEDURE — 3288F FALL RISK ASSESSMENT DOCD: CPT | Mod: ,,, | Performed by: NURSE PRACTITIONER

## 2023-06-21 PROCEDURE — 1126F AMNT PAIN NOTED NONE PRSNT: CPT | Mod: ,,, | Performed by: NURSE PRACTITIONER

## 2023-06-21 RX ORDER — ATORVASTATIN CALCIUM 40 MG/1
40 TABLET, FILM COATED ORAL DAILY
Qty: 90 TABLET | Refills: 1 | Status: SHIPPED | OUTPATIENT
Start: 2023-06-21 | End: 2023-11-13 | Stop reason: SDUPTHER

## 2023-06-21 RX ORDER — LOSARTAN POTASSIUM 25 MG/1
25 TABLET ORAL DAILY
Qty: 90 TABLET | Refills: 1 | Status: SHIPPED | OUTPATIENT
Start: 2023-06-21 | End: 2023-11-13 | Stop reason: SDUPTHER

## 2023-06-21 RX ORDER — METFORMIN HYDROCHLORIDE 500 MG/1
500 TABLET ORAL 2 TIMES DAILY
Qty: 180 TABLET | Refills: 1 | Status: SHIPPED | OUTPATIENT
Start: 2023-06-21 | End: 2023-11-13 | Stop reason: SDUPTHER

## 2023-06-21 RX ORDER — INSULIN PUMP SYRINGE, 3 ML
EACH MISCELLANEOUS
Qty: 1 EACH | Refills: 0 | Status: SHIPPED | OUTPATIENT
Start: 2023-06-21 | End: 2023-11-08

## 2023-06-21 RX ORDER — AMLODIPINE BESYLATE 10 MG/1
10 TABLET ORAL DAILY
Qty: 90 TABLET | Refills: 1 | Status: SHIPPED | OUTPATIENT
Start: 2023-06-21 | End: 2023-11-13 | Stop reason: SDUPTHER

## 2023-06-22 LAB
ALBUMIN SERPL BCP-MCNC: 4.1 G/DL (ref 3.5–5)
ALBUMIN/GLOB SERPL: 1.1 {RATIO}
ALP SERPL-CCNC: 77 U/L (ref 55–142)
ALT SERPL W P-5'-P-CCNC: 27 U/L (ref 13–56)
ANION GAP SERPL CALCULATED.3IONS-SCNC: 12 MMOL/L (ref 7–16)
AST SERPL W P-5'-P-CCNC: 15 U/L (ref 15–37)
BASOPHILS # BLD AUTO: 0.03 K/UL (ref 0–0.2)
BASOPHILS NFR BLD AUTO: 0.5 % (ref 0–1)
BILIRUB SERPL-MCNC: 0.2 MG/DL (ref ?–1.2)
BUN SERPL-MCNC: 7 MG/DL (ref 7–18)
BUN/CREAT SERPL: 9 (ref 6–20)
CALCIUM SERPL-MCNC: 9.7 MG/DL (ref 8.5–10.1)
CHLORIDE SERPL-SCNC: 111 MMOL/L (ref 98–107)
CHOLEST SERPL-MCNC: 206 MG/DL (ref 0–200)
CHOLEST/HDLC SERPL: 3.6 {RATIO}
CO2 SERPL-SCNC: 21 MMOL/L (ref 21–32)
CREAT SERPL-MCNC: 0.76 MG/DL (ref 0.55–1.02)
DIFFERENTIAL METHOD BLD: ABNORMAL
EGFR (NO RACE VARIABLE) (RUSH/TITUS): 84 ML/MIN/1.73M2
EOSINOPHIL # BLD AUTO: 0.08 K/UL (ref 0–0.5)
EOSINOPHIL NFR BLD AUTO: 1.2 % (ref 1–4)
ERYTHROCYTE [DISTWIDTH] IN BLOOD BY AUTOMATED COUNT: 13.5 % (ref 11.5–14.5)
EST. AVERAGE GLUCOSE BLD GHB EST-MCNC: 137 MG/DL
GLOBULIN SER-MCNC: 3.9 G/DL (ref 2–4)
GLUCOSE SERPL-MCNC: 87 MG/DL (ref 74–106)
HBA1C MFR BLD HPLC: 6.7 % (ref 4.5–6.6)
HCT VFR BLD AUTO: 36.9 % (ref 38–47)
HDLC SERPL-MCNC: 58 MG/DL (ref 40–60)
HGB BLD-MCNC: 11.7 G/DL (ref 12–16)
IMM GRANULOCYTES # BLD AUTO: 0.03 K/UL (ref 0–0.04)
IMM GRANULOCYTES NFR BLD: 0.5 % (ref 0–0.4)
LDLC SERPL CALC-MCNC: 103 MG/DL
LDLC/HDLC SERPL: 1.8 {RATIO}
LYMPHOCYTES # BLD AUTO: 2.79 K/UL (ref 1–4.8)
LYMPHOCYTES NFR BLD AUTO: 42.3 % (ref 27–41)
MCH RBC QN AUTO: 28 PG (ref 27–31)
MCHC RBC AUTO-ENTMCNC: 31.7 G/DL (ref 32–36)
MCV RBC AUTO: 88.3 FL (ref 80–96)
MONOCYTES # BLD AUTO: 0.55 K/UL (ref 0–0.8)
MONOCYTES NFR BLD AUTO: 8.3 % (ref 2–6)
MPC BLD CALC-MCNC: 10.1 FL (ref 9.4–12.4)
NEUTROPHILS # BLD AUTO: 3.12 K/UL (ref 1.8–7.7)
NEUTROPHILS NFR BLD AUTO: 47.2 % (ref 53–65)
NONHDLC SERPL-MCNC: 148 MG/DL
NRBC # BLD AUTO: 0 X10E3/UL
NRBC, AUTO (.00): 0 %
PLATELET # BLD AUTO: 391 K/UL (ref 150–400)
POTASSIUM SERPL-SCNC: 3.7 MMOL/L (ref 3.5–5.1)
PROT SERPL-MCNC: 8 G/DL (ref 6.4–8.2)
RBC # BLD AUTO: 4.18 M/UL (ref 4.2–5.4)
SODIUM SERPL-SCNC: 140 MMOL/L (ref 136–145)
TRIGL SERPL-MCNC: 224 MG/DL (ref 35–150)
TSH SERPL DL<=0.005 MIU/L-ACNC: 1.51 UIU/ML (ref 0.36–3.74)
VLDLC SERPL-MCNC: 45 MG/DL
WBC # BLD AUTO: 6.6 K/UL (ref 4.5–11)

## 2023-07-20 ENCOUNTER — PATIENT MESSAGE (OUTPATIENT)
Dept: ADMINISTRATIVE | Facility: HOSPITAL | Age: 68
End: 2023-07-20

## 2023-11-08 DIAGNOSIS — E11.9 TYPE 2 DIABETES MELLITUS WITHOUT COMPLICATION, WITHOUT LONG-TERM CURRENT USE OF INSULIN: ICD-10-CM

## 2023-11-08 RX ORDER — INSULIN PUMP SYRINGE, 3 ML
EACH MISCELLANEOUS
Qty: 1 EACH | Refills: 0 | Status: SHIPPED | OUTPATIENT
Start: 2023-11-08 | End: 2024-11-07

## 2023-11-09 ENCOUNTER — OFFICE VISIT (OUTPATIENT)
Dept: FAMILY MEDICINE | Facility: CLINIC | Age: 68
End: 2023-11-09
Payer: COMMERCIAL

## 2023-11-09 VITALS
BODY MASS INDEX: 31.16 KG/M2 | WEIGHT: 169.31 LBS | SYSTOLIC BLOOD PRESSURE: 134 MMHG | HEART RATE: 86 BPM | DIASTOLIC BLOOD PRESSURE: 77 MMHG | OXYGEN SATURATION: 99 % | TEMPERATURE: 98 F | RESPIRATION RATE: 12 BRPM | HEIGHT: 62 IN

## 2023-11-09 DIAGNOSIS — E66.09 CLASS 1 OBESITY DUE TO EXCESS CALORIES WITH SERIOUS COMORBIDITY AND BODY MASS INDEX (BMI) OF 30.0 TO 30.9 IN ADULT: ICD-10-CM

## 2023-11-09 DIAGNOSIS — E11.9 TYPE 2 DIABETES MELLITUS WITHOUT COMPLICATION, WITHOUT LONG-TERM CURRENT USE OF INSULIN: ICD-10-CM

## 2023-11-09 DIAGNOSIS — I10 ESSENTIAL HYPERTENSION: ICD-10-CM

## 2023-11-09 DIAGNOSIS — E78.5 HYPERLIPIDEMIA, UNSPECIFIED HYPERLIPIDEMIA TYPE: ICD-10-CM

## 2023-11-09 DIAGNOSIS — Z00.00 ENCOUNTER FOR SUBSEQUENT ANNUAL WELLNESS VISIT (AWV) IN MEDICARE PATIENT: Primary | ICD-10-CM

## 2023-11-09 PROCEDURE — 3078F DIAST BP <80 MM HG: CPT | Mod: ,,, | Performed by: NURSE PRACTITIONER

## 2023-11-09 PROCEDURE — 1159F PR MEDICATION LIST DOCUMENTED IN MEDICAL RECORD: ICD-10-PCS | Mod: ,,, | Performed by: NURSE PRACTITIONER

## 2023-11-09 PROCEDURE — 1126F PR PAIN SEVERITY QUANTIFIED, NO PAIN PRESENT: ICD-10-PCS | Mod: ,,, | Performed by: NURSE PRACTITIONER

## 2023-11-09 PROCEDURE — G0439 PPPS, SUBSEQ VISIT: HCPCS | Mod: ,,, | Performed by: NURSE PRACTITIONER

## 2023-11-09 PROCEDURE — 1101F PR PT FALLS ASSESS DOC 0-1 FALLS W/OUT INJ PAST YR: ICD-10-PCS | Mod: ,,, | Performed by: NURSE PRACTITIONER

## 2023-11-09 PROCEDURE — 1170F PR FUNCTIONAL STATUS ASSESSED: ICD-10-PCS | Mod: ,,, | Performed by: NURSE PRACTITIONER

## 2023-11-09 PROCEDURE — G0439 PR MEDICARE ANNUAL WELLNESS SUBSEQUENT VISIT: ICD-10-PCS | Mod: ,,, | Performed by: NURSE PRACTITIONER

## 2023-11-09 PROCEDURE — 1101F PT FALLS ASSESS-DOCD LE1/YR: CPT | Mod: ,,, | Performed by: NURSE PRACTITIONER

## 2023-11-09 PROCEDURE — 3044F HG A1C LEVEL LT 7.0%: CPT | Mod: ,,, | Performed by: NURSE PRACTITIONER

## 2023-11-09 PROCEDURE — 3044F PR MOST RECENT HEMOGLOBIN A1C LEVEL <7.0%: ICD-10-PCS | Mod: ,,, | Performed by: NURSE PRACTITIONER

## 2023-11-09 PROCEDURE — 1170F FXNL STATUS ASSESSED: CPT | Mod: ,,, | Performed by: NURSE PRACTITIONER

## 2023-11-09 PROCEDURE — 4010F ACE/ARB THERAPY RXD/TAKEN: CPT | Mod: ,,, | Performed by: NURSE PRACTITIONER

## 2023-11-09 PROCEDURE — 1126F AMNT PAIN NOTED NONE PRSNT: CPT | Mod: ,,, | Performed by: NURSE PRACTITIONER

## 2023-11-09 PROCEDURE — 3288F PR FALLS RISK ASSESSMENT DOCUMENTED: ICD-10-PCS | Mod: ,,, | Performed by: NURSE PRACTITIONER

## 2023-11-09 PROCEDURE — 3075F PR MOST RECENT SYSTOLIC BLOOD PRESS GE 130-139MM HG: ICD-10-PCS | Mod: ,,, | Performed by: NURSE PRACTITIONER

## 2023-11-09 PROCEDURE — 3288F FALL RISK ASSESSMENT DOCD: CPT | Mod: ,,, | Performed by: NURSE PRACTITIONER

## 2023-11-09 PROCEDURE — 1159F MED LIST DOCD IN RCRD: CPT | Mod: ,,, | Performed by: NURSE PRACTITIONER

## 2023-11-09 PROCEDURE — 4010F PR ACE/ARB THEARPY RXD/TAKEN: ICD-10-PCS | Mod: ,,, | Performed by: NURSE PRACTITIONER

## 2023-11-09 PROCEDURE — 3078F PR MOST RECENT DIASTOLIC BLOOD PRESSURE < 80 MM HG: ICD-10-PCS | Mod: ,,, | Performed by: NURSE PRACTITIONER

## 2023-11-09 PROCEDURE — 3075F SYST BP GE 130 - 139MM HG: CPT | Mod: ,,, | Performed by: NURSE PRACTITIONER

## 2023-11-09 NOTE — PROGRESS NOTES
"  Dari Valverde presented for a  Medicare AWV and comprehensive Health Risk Assessment today. The following components were reviewed and updated:    Medical history  Family History  Social history  Allergies and Current Medications  Health Risk Assessment  Health Maintenance  Care Team         ** See Completed Assessments for Annual Wellness Visit within the encounter summary.**         The following assessments were completed:  Living Situation  CAGE  Depression Screening  Timed Get Up and Go  Whisper Test  Cognitive Function Screening  Nutrition Screening  ADL Screening  PAQ Screening          Vitals:    11/09/23 1506   BP: 134/77   BP Location: Left arm   Patient Position: Sitting   Pulse: 86   Resp: 12   Temp: 97.8 °F (36.6 °C)   TempSrc: Oral   SpO2: 99%   Weight: 76.8 kg (169 lb 4.8 oz)   Height: 5' 2" (1.575 m)     Body mass index is 30.97 kg/m².  Physical Exam        Constitutional: She is oriented to person, place, and time. No distress.   HENT:   Head: Atraumatic.   Mouth/Throat: Mucous membranes are moist.   Cardiovascular: Normal rate and regular rhythm. Pulmonary:      Effort: Pulmonary effort is normal. No respiratory distress.      Breath sounds: Normal breath sounds. No wheezing, rhonchi or rales.      Abdominal: Soft. Bowel sounds are normal. She exhibits no distension. There is no abdominal tenderness.   Musculoskeletal:         General: Normal range of motion.      Cervical back: Neck supple.      Right lower leg: No edema.      Left lower leg: No edema.   Neurological: She is alert and oriented to person, place, and time. Gait normal.   Skin: Skin is warm and dry.   Psychiatric: Her behavior is normal. Mood normal.     Diagnoses and health risks identified today and associated recommendations/orders:    1. Encounter for subsequent annual wellness visit (AWV) in Medicare patient  Appointment for AWV in one year       2. Essential hypertension  Continue current medication, DASH diet, home blood " pressure monitoring, physical activity 30 minutes on 5 days per week       3. Hyperlipidemia, unspecified hyperlipidemia type  Continue current medication, low saturated fat low cholesterol heart healthy diet, physical exercise 30 minutes on five days per week      4. Type 2 diabetes mellitus without complication, without long-term current use of insulin  Continue current medication, diabetic diet, home blood glucose monitoring, a1c every 3-6 months, physical activity 30 minutes 5 days per week       5. Class 1 obesity due to excess calories with serious comorbidity and body mass index (BMI) of 30.0 to 30.9 in adult  Weight loss by reducing calories by 500 kcal  per day, physical exercise 30 minutes on 5 days per week        Declines pneumonia vaccine and DEXA     Provided Dari with a 5-10 year written screening schedule and personal prevention plan. Recommendations were developed using the USPSTF age appropriate recommendations. Education, counseling, and referrals were provided as needed. After Visit Summary printed and given to patient which includes a list of additional screenings\tests needed.    Follow up for 1 year for Annual Wellness Visit.    KAMALJIT HAMILTON RN      I offered to discuss advanced care planning, including how to pick a person who would make decisions for you if you were unable to make them for yourself, called a health care power of , and what kind of decisions you might make such as use of life sustaining treatments such as ventilators and tube feeding when faced with a life limiting illness recorded on a living will that they will need to know. (How you want to be cared for as you near the end of your natural life)     X Patient is interested in learning more about how to make advanced directives.  I provided them paperwork and offered to discuss this with them.

## 2023-11-09 NOTE — PATIENT INSTRUCTIONS
Counseling and Referral of Other Preventative  (Italic type indicates deductible and co-insurance are waived)    Patient Name: Dari Valverde  Today's Date: 11/9/2023    Health Maintenance         Date Due Completion Date    Pneumococcal Vaccines (Age 65+) (1 - PCV) Never done ---    Foot Exam Never done ---    TETANUS VACCINE Never done ---    DEXA Scan Never done ---    Shingles Vaccine (1 of 2) Never done ---    RSV Vaccine (Age 60+) (1 - 1-dose 60+ series) Never done ---    Diabetes Urine Screening 11/01/2023 11/1/2022    COVID-19 Vaccine (6 - 2023-24 season) 11/20/2023 9/25/2023    Mammogram 01/19/2024 1/19/2023    Hemoglobin A1c 12/21/2023 6/21/2023    Lipid Panel 06/21/2024 6/21/2023    Eye Exam 10/05/2024 10/5/2023    Low Dose Statin 11/09/2024 11/9/2023    Colorectal Cancer Screening 11/23/2024 11/23/2021          No orders of the defined types were placed in this encounter.    The following information is provided to all patients.  This information is to help you find resources for any of the problems found today that may be affecting your health:                Living healthy guide: www.[facility].[place].gov      Understanding Diabetes: www.diabetes.org      Eating healthy: www.cdc.gov/healthyweight      CDC home safety checklist: www.cdc.gov/steadi/patient.html      Agency on Aging: www.goea.[place].gov      Alcoholics anonymous (AA): www.aa.org      Physical Activity: www.[place].nih.gov/lw9ddje      Tobacco use: www.quitwithusla.org

## 2023-11-13 NOTE — PROGRESS NOTES
Clinic note     Patient name: Dari Valverde is a 68 y.o. female   Chief compliant   Chief Complaint   Patient presents with    Shoulder Pain     Right shoulder, left neck. Pain for the last couple of months worse the last few weeks. Numbness in right arm. No previous surgeries on neck or shoulder.        Subjective     History of present illness   In clinic for routine medication refills   Reports ongoing right shoulder pain and cervicalgia for a few month with worsening over the last week, she also reports occasional numbness to right hand and fingers; denies any known injury or trauma, no hx of shoulder or cervical spine surgery     Past Medical History: HTN, Type 2 DM, HLD     Last A1c 6.7, this will be rechecked today   Checking blood glucose: BID and prn   Blood glucose log not in clinic     , on statin             Social History     Tobacco Use    Smoking status: Never     Passive exposure: Never    Smokeless tobacco: Never   Substance Use Topics    Alcohol use: Never    Drug use: Never       Review of patient's allergies indicates:   Allergen Reactions    Lisinopril      Causes cough        Past Medical History:   Diagnosis Date    Diabetes     Hyperlipemia     Hypertension        Past Surgical History:   Procedure Laterality Date     SECTION      CHOLECYSTECTOMY      TUBAL LIGATION          Family History   Problem Relation Age of Onset    Dementia Mother     Heart disease Father     Stroke Maternal Grandmother          Current Outpatient Medications:     blood-glucose meter (ONETOUCH VERIO FLEX START) kit, Use as directed to check blood glucose, Disp: 1 each, Rfl: 0    amLODIPine (NORVASC) 10 MG tablet, Take 1 tablet (10 mg total) by mouth once daily., Disp: 90 tablet, Rfl: 1    atorvastatin (LIPITOR) 40 MG tablet, Take 1 tablet (40 mg total) by mouth once daily., Disp: 90 tablet, Rfl: 1    blood sugar diagnostic Strp, One touch verio flex Use one test strip to check blood glucose BID,  "Disp: 180 strip, Rfl: 3    lancets (LANCETS,THIN) Misc, Use one lancet to check blood glucose BID, Disp: 200 each, Rfl: 3    losartan (COZAAR) 25 MG tablet, Take 1 tablet (25 mg total) by mouth once daily., Disp: 90 tablet, Rfl: 1    metFORMIN (GLUCOPHAGE) 500 MG tablet, Take 1 tablet (500 mg total) by mouth 2 (two) times daily., Disp: 180 tablet, Rfl: 1    Review of Systems   Constitutional:  Negative for appetite change, chills, fatigue, fever and unexpected weight change.   Respiratory:  Negative for cough and shortness of breath.    Cardiovascular:  Negative for chest pain, palpitations and leg swelling.   Gastrointestinal:  Negative for abdominal pain, change in bowel habit, constipation, diarrhea, nausea and vomiting.   Genitourinary:  Negative for dysuria and frequency.   Musculoskeletal:  Positive for arthralgias and neck pain. Negative for gait problem and myalgias.   Neurological:  Negative for dizziness, syncope, light-headedness and headaches.   Psychiatric/Behavioral:  Negative for dysphoric mood and sleep disturbance. The patient is not nervous/anxious.        Objective     /83   Pulse 89   Ht 5' 2" (1.575 m)   Wt 75.6 kg (166 lb 11.2 oz)   SpO2 99%   BMI 30.49 kg/m²     Physical Exam   Constitutional: She is oriented to person, place, and time. No distress.   HENT:   Head: Atraumatic.   Mouth/Throat: Mucous membranes are moist.   Cardiovascular: Normal rate and regular rhythm. Pulmonary:      Effort: Pulmonary effort is normal. No respiratory distress.      Breath sounds: Normal breath sounds. No wheezing, rhonchi or rales.     Abdominal: Soft. Bowel sounds are normal. She exhibits no distension. There is no abdominal tenderness.   Musculoskeletal:      Right shoulder: Tenderness and bony tenderness present. Decreased range of motion.      Right wrist: Normal.      Right hand: Normal. No swelling, deformity, tenderness or bony tenderness. Normal pulse.      Cervical back: Neck supple. " Tenderness and bony tenderness present.      Right lower leg: No edema.      Left lower leg: No edema.   Neurological: She is alert and oriented to person, place, and time. Gait normal.   Skin: Skin is warm and dry.   Psychiatric: Her behavior is normal. Mood normal.       Lab Results   Component Value Date    WBC 6.60 06/21/2023    HGB 11.7 (L) 06/21/2023    HCT 36.9 (L) 06/21/2023    MCV 88.3 06/21/2023     06/21/2023       CMP  Sodium   Date Value Ref Range Status   06/21/2023 140 136 - 145 mmol/L Final     Potassium   Date Value Ref Range Status   06/21/2023 3.7 3.5 - 5.1 mmol/L Final     Chloride   Date Value Ref Range Status   06/21/2023 111 (H) 98 - 107 mmol/L Final     CO2   Date Value Ref Range Status   06/21/2023 21 21 - 32 mmol/L Final     Glucose   Date Value Ref Range Status   06/21/2023 87 74 - 106 mg/dL Final     BUN   Date Value Ref Range Status   06/21/2023 7 7 - 18 mg/dL Final     Creatinine   Date Value Ref Range Status   06/21/2023 0.76 0.55 - 1.02 mg/dL Final     Calcium   Date Value Ref Range Status   06/21/2023 9.7 8.5 - 10.1 mg/dL Final     Total Protein   Date Value Ref Range Status   06/21/2023 8.0 6.4 - 8.2 g/dL Final     Albumin   Date Value Ref Range Status   06/21/2023 4.1 3.5 - 5.0 g/dL Final     Bilirubin, Total   Date Value Ref Range Status   06/21/2023 0.2 >0.0 - 1.2 mg/dL Final     Alk Phos   Date Value Ref Range Status   06/21/2023 77 55 - 142 U/L Final     AST   Date Value Ref Range Status   06/21/2023 15 15 - 37 U/L Final     ALT   Date Value Ref Range Status   06/21/2023 27 13 - 56 U/L Final     Anion Gap   Date Value Ref Range Status   06/21/2023 12 7 - 16 mmol/L Final     eGFR   Date Value Ref Range Status   04/05/2022 80 >=60 mL/min/1.73m² Final     Lab Results   Component Value Date    TSH 1.510 06/21/2023     Lab Results   Component Value Date    CHOL 206 (H) 06/21/2023    CHOL 139 04/05/2022    CHOL 232 (H) 11/04/2021     Lab Results   Component Value Date    HDL  58 06/21/2023    HDL 70 (H) 04/05/2022    HDL 67 (H) 11/04/2021     Lab Results   Component Value Date    LDLCALC 103 06/21/2023    LDLCALC 44 04/05/2022    LDLCALC 128 11/04/2021     Lab Results   Component Value Date    TRIG 224 (H) 06/21/2023    TRIG 126 04/05/2022    TRIG 186 (H) 11/04/2021     Lab Results   Component Value Date    CHOLHDL 3.6 06/21/2023    CHOLHDL 2.0 04/05/2022    CHOLHDL 3.5 11/04/2021     Lab Results   Component Value Date    HGBA1C 6.7 (H) 06/21/2023         Assessment and Plan   Type 2 diabetes mellitus without complication, without long-term current use of insulin  -     Hemoglobin A1C; Future; Expected date: 11/14/2023  -     Microalbumin/Creatinine Ratio, Urine; Future; Expected date: 11/14/2023  -     Discontinue: metFORMIN (GLUCOPHAGE) 500 MG tablet; Take 1 tablet (500 mg total) by mouth 2 (two) times daily.  Dispense: 180 tablet; Refill: 1  -     Discontinue: blood sugar diagnostic Strp; One touch verio flex Use one test strip to check blood glucose BID  Dispense: 180 strip; Refill: 3  -     Discontinue: lancets (LANCETS,THIN) Misc; Use one lancet to check blood glucose BID  Dispense: 200 each; Refill: 3  -     metFORMIN (GLUCOPHAGE) 500 MG tablet; Take 1 tablet (500 mg total) by mouth 2 (two) times daily.  Dispense: 180 tablet; Refill: 1  -     lancets (LANCETS,THIN) Misc; Use one lancet to check blood glucose BID  Dispense: 200 each; Refill: 3  -     blood sugar diagnostic Strp; One touch verio flex Use one test strip to check blood glucose BID  Dispense: 180 strip; Refill: 3    Essential hypertension  -     Discontinue: losartan (COZAAR) 25 MG tablet; Take 1 tablet (25 mg total) by mouth once daily.  Dispense: 90 tablet; Refill: 1  -     Discontinue: amLODIPine (NORVASC) 10 MG tablet; Take 1 tablet (10 mg total) by mouth once daily.  Dispense: 90 tablet; Refill: 1  -     losartan (COZAAR) 25 MG tablet; Take 1 tablet (25 mg total) by mouth once daily.  Dispense: 90 tablet; Refill:  1  -     amLODIPine (NORVASC) 10 MG tablet; Take 1 tablet (10 mg total) by mouth once daily.  Dispense: 90 tablet; Refill: 1    Hyperlipidemia, unspecified hyperlipidemia type  -     Discontinue: atorvastatin (LIPITOR) 40 MG tablet; Take 1 tablet (40 mg total) by mouth once daily.  Dispense: 90 tablet; Refill: 1  -     atorvastatin (LIPITOR) 40 MG tablet; Take 1 tablet (40 mg total) by mouth once daily.  Dispense: 90 tablet; Refill: 1    Cervicalgia  -     X-Ray Cervical Spine AP And Lateral; Future; Expected date: 11/14/2023    Right shoulder pain, unspecified chronicity  -     X-ray Shoulder 2 or More Views Right; Future; Expected date: 11/14/2023    Numbness and tingling in right hand          Patient Instructions  Patient Instructions   Lab obtained in clinic today, we will notify you of results and any necessary changes to plan of care     Refills on routine medication provided, will be due again on 5/12/2024    Xray of cervical spine and right shoulder obtained, will notify of results when available       You may use tylenol arthritis as needed for pain, do not exceed 4 grams in 24 hours

## 2023-11-14 ENCOUNTER — OFFICE VISIT (OUTPATIENT)
Dept: FAMILY MEDICINE | Facility: CLINIC | Age: 68
End: 2023-11-14
Payer: COMMERCIAL

## 2023-11-14 VITALS
SYSTOLIC BLOOD PRESSURE: 139 MMHG | BODY MASS INDEX: 30.67 KG/M2 | WEIGHT: 166.69 LBS | OXYGEN SATURATION: 99 % | HEIGHT: 62 IN | DIASTOLIC BLOOD PRESSURE: 83 MMHG | HEART RATE: 89 BPM

## 2023-11-14 DIAGNOSIS — I10 ESSENTIAL HYPERTENSION: ICD-10-CM

## 2023-11-14 DIAGNOSIS — R20.2 NUMBNESS AND TINGLING IN RIGHT HAND: ICD-10-CM

## 2023-11-14 DIAGNOSIS — E11.9 TYPE 2 DIABETES MELLITUS WITHOUT COMPLICATION, WITHOUT LONG-TERM CURRENT USE OF INSULIN: Primary | ICD-10-CM

## 2023-11-14 DIAGNOSIS — M25.511 RIGHT SHOULDER PAIN, UNSPECIFIED CHRONICITY: ICD-10-CM

## 2023-11-14 DIAGNOSIS — M54.2 CERVICALGIA: ICD-10-CM

## 2023-11-14 DIAGNOSIS — E78.5 HYPERLIPIDEMIA, UNSPECIFIED HYPERLIPIDEMIA TYPE: ICD-10-CM

## 2023-11-14 DIAGNOSIS — R20.0 NUMBNESS AND TINGLING IN RIGHT HAND: ICD-10-CM

## 2023-11-14 LAB
CREAT UR-MCNC: 82 MG/DL (ref 28–219)
EST. AVERAGE GLUCOSE BLD GHB EST-MCNC: 137 MG/DL
HBA1C MFR BLD HPLC: 6.4 % (ref 4.5–6.6)
MICROALBUMIN UR-MCNC: 3.1 MG/DL (ref 0–2.8)
MICROALBUMIN/CREAT RATIO PNL UR: 37.8 MG/G (ref 0–30)

## 2023-11-14 PROCEDURE — 1159F PR MEDICATION LIST DOCUMENTED IN MEDICAL RECORD: ICD-10-PCS | Mod: ,,, | Performed by: NURSE PRACTITIONER

## 2023-11-14 PROCEDURE — 1160F RVW MEDS BY RX/DR IN RCRD: CPT | Mod: ,,, | Performed by: NURSE PRACTITIONER

## 2023-11-14 PROCEDURE — 3288F PR FALLS RISK ASSESSMENT DOCUMENTED: ICD-10-PCS | Mod: ,,, | Performed by: NURSE PRACTITIONER

## 2023-11-14 PROCEDURE — 3008F PR BODY MASS INDEX (BMI) DOCUMENTED: ICD-10-PCS | Mod: ,,, | Performed by: NURSE PRACTITIONER

## 2023-11-14 PROCEDURE — 99214 OFFICE O/P EST MOD 30 MIN: CPT | Mod: ,,, | Performed by: NURSE PRACTITIONER

## 2023-11-14 PROCEDURE — 3075F PR MOST RECENT SYSTOLIC BLOOD PRESS GE 130-139MM HG: ICD-10-PCS | Mod: ,,, | Performed by: NURSE PRACTITIONER

## 2023-11-14 PROCEDURE — 3075F SYST BP GE 130 - 139MM HG: CPT | Mod: ,,, | Performed by: NURSE PRACTITIONER

## 2023-11-14 PROCEDURE — 82043 UR ALBUMIN QUANTITATIVE: CPT | Mod: ,,, | Performed by: CLINICAL MEDICAL LABORATORY

## 2023-11-14 PROCEDURE — 3079F PR MOST RECENT DIASTOLIC BLOOD PRESSURE 80-89 MM HG: ICD-10-PCS | Mod: ,,, | Performed by: NURSE PRACTITIONER

## 2023-11-14 PROCEDURE — 83036 HEMOGLOBIN GLYCOSYLATED A1C: CPT | Mod: ,,, | Performed by: CLINICAL MEDICAL LABORATORY

## 2023-11-14 PROCEDURE — 3044F HG A1C LEVEL LT 7.0%: CPT | Mod: ,,, | Performed by: NURSE PRACTITIONER

## 2023-11-14 PROCEDURE — 82570 ASSAY OF URINE CREATININE: CPT | Mod: ,,, | Performed by: CLINICAL MEDICAL LABORATORY

## 2023-11-14 PROCEDURE — 3044F PR MOST RECENT HEMOGLOBIN A1C LEVEL <7.0%: ICD-10-PCS | Mod: ,,, | Performed by: NURSE PRACTITIONER

## 2023-11-14 PROCEDURE — 1101F PR PT FALLS ASSESS DOC 0-1 FALLS W/OUT INJ PAST YR: ICD-10-PCS | Mod: ,,, | Performed by: NURSE PRACTITIONER

## 2023-11-14 PROCEDURE — 4010F PR ACE/ARB THEARPY RXD/TAKEN: ICD-10-PCS | Mod: ,,, | Performed by: NURSE PRACTITIONER

## 2023-11-14 PROCEDURE — 82570 MICROALBUMIN / CREATININE RATIO URINE: ICD-10-PCS | Mod: ,,, | Performed by: CLINICAL MEDICAL LABORATORY

## 2023-11-14 PROCEDURE — 3079F DIAST BP 80-89 MM HG: CPT | Mod: ,,, | Performed by: NURSE PRACTITIONER

## 2023-11-14 PROCEDURE — 1126F PR PAIN SEVERITY QUANTIFIED, NO PAIN PRESENT: ICD-10-PCS | Mod: ,,, | Performed by: NURSE PRACTITIONER

## 2023-11-14 PROCEDURE — 1159F MED LIST DOCD IN RCRD: CPT | Mod: ,,, | Performed by: NURSE PRACTITIONER

## 2023-11-14 PROCEDURE — 3008F BODY MASS INDEX DOCD: CPT | Mod: ,,, | Performed by: NURSE PRACTITIONER

## 2023-11-14 PROCEDURE — 4010F ACE/ARB THERAPY RXD/TAKEN: CPT | Mod: ,,, | Performed by: NURSE PRACTITIONER

## 2023-11-14 PROCEDURE — 82043 MICROALBUMIN / CREATININE RATIO URINE: ICD-10-PCS | Mod: ,,, | Performed by: CLINICAL MEDICAL LABORATORY

## 2023-11-14 PROCEDURE — 1126F AMNT PAIN NOTED NONE PRSNT: CPT | Mod: ,,, | Performed by: NURSE PRACTITIONER

## 2023-11-14 PROCEDURE — 99214 PR OFFICE/OUTPT VISIT, EST, LEVL IV, 30-39 MIN: ICD-10-PCS | Mod: ,,, | Performed by: NURSE PRACTITIONER

## 2023-11-14 PROCEDURE — 1101F PT FALLS ASSESS-DOCD LE1/YR: CPT | Mod: ,,, | Performed by: NURSE PRACTITIONER

## 2023-11-14 PROCEDURE — 83036 HEMOGLOBIN A1C: ICD-10-PCS | Mod: ,,, | Performed by: CLINICAL MEDICAL LABORATORY

## 2023-11-14 PROCEDURE — 3288F FALL RISK ASSESSMENT DOCD: CPT | Mod: ,,, | Performed by: NURSE PRACTITIONER

## 2023-11-14 PROCEDURE — 1160F PR REVIEW ALL MEDS BY PRESCRIBER/CLIN PHARMACIST DOCUMENTED: ICD-10-PCS | Mod: ,,, | Performed by: NURSE PRACTITIONER

## 2023-11-14 RX ORDER — METFORMIN HYDROCHLORIDE 500 MG/1
500 TABLET ORAL 2 TIMES DAILY
Qty: 180 TABLET | Refills: 1 | Status: SHIPPED | OUTPATIENT
Start: 2023-11-14 | End: 2023-11-14 | Stop reason: SDUPTHER

## 2023-11-14 RX ORDER — ATORVASTATIN CALCIUM 40 MG/1
40 TABLET, FILM COATED ORAL DAILY
Qty: 90 TABLET | Refills: 1 | Status: SHIPPED | OUTPATIENT
Start: 2023-11-14 | End: 2023-11-14 | Stop reason: SDUPTHER

## 2023-11-14 RX ORDER — AMLODIPINE BESYLATE 10 MG/1
10 TABLET ORAL DAILY
Qty: 90 TABLET | Refills: 1 | Status: SHIPPED | OUTPATIENT
Start: 2023-11-14 | End: 2023-11-14 | Stop reason: SDUPTHER

## 2023-11-14 RX ORDER — AMLODIPINE BESYLATE 10 MG/1
10 TABLET ORAL DAILY
Qty: 90 TABLET | Refills: 1 | Status: SHIPPED | OUTPATIENT
Start: 2023-11-14

## 2023-11-14 RX ORDER — ATORVASTATIN CALCIUM 40 MG/1
40 TABLET, FILM COATED ORAL DAILY
Qty: 90 TABLET | Refills: 1 | Status: SHIPPED | OUTPATIENT
Start: 2023-11-14

## 2023-11-14 RX ORDER — LANCETS
EACH MISCELLANEOUS
Qty: 200 EACH | Refills: 3 | Status: SHIPPED | OUTPATIENT
Start: 2023-11-14

## 2023-11-14 RX ORDER — LOSARTAN POTASSIUM 25 MG/1
25 TABLET ORAL DAILY
Qty: 90 TABLET | Refills: 1 | Status: SHIPPED | OUTPATIENT
Start: 2023-11-14 | End: 2023-11-14 | Stop reason: SDUPTHER

## 2023-11-14 RX ORDER — LANCETS
EACH MISCELLANEOUS
Qty: 200 EACH | Refills: 3 | Status: SHIPPED | OUTPATIENT
Start: 2023-11-14 | End: 2023-11-14 | Stop reason: SDUPTHER

## 2023-11-14 RX ORDER — LOSARTAN POTASSIUM 25 MG/1
25 TABLET ORAL DAILY
Qty: 90 TABLET | Refills: 1 | Status: SHIPPED | OUTPATIENT
Start: 2023-11-14

## 2023-11-14 RX ORDER — METFORMIN HYDROCHLORIDE 500 MG/1
500 TABLET ORAL 2 TIMES DAILY
Qty: 180 TABLET | Refills: 1 | Status: SHIPPED | OUTPATIENT
Start: 2023-11-14

## 2023-11-14 NOTE — PATIENT INSTRUCTIONS
Lab obtained in clinic today, we will notify you of results and any necessary changes to plan of care     Refills on routine medication provided, will be due again on 5/12/2024    Xray of cervical spine and right shoulder obtained, will notify of results when available       You may use tylenol arthritis as needed for pain, do not exceed 4 grams in 24 hours

## 2023-11-15 DIAGNOSIS — M50.30 DDD (DEGENERATIVE DISC DISEASE), CERVICAL: ICD-10-CM

## 2023-11-15 DIAGNOSIS — M54.2 CERVICALGIA: ICD-10-CM

## 2023-11-15 DIAGNOSIS — M25.511 RIGHT SHOULDER PAIN, UNSPECIFIED CHRONICITY: Primary | ICD-10-CM

## 2023-11-15 DIAGNOSIS — M19.011 OSTEOARTHRITIS OF RIGHT SHOULDER, UNSPECIFIED OSTEOARTHRITIS TYPE: ICD-10-CM

## 2023-11-29 ENCOUNTER — CLINICAL SUPPORT (OUTPATIENT)
Dept: REHABILITATION | Facility: HOSPITAL | Age: 68
End: 2023-11-29
Payer: COMMERCIAL

## 2023-11-29 DIAGNOSIS — M25.611 DECREASED RIGHT SHOULDER RANGE OF MOTION: Primary | ICD-10-CM

## 2023-11-29 DIAGNOSIS — M53.82 DECREASED RANGE OF MOTION OF INTERVERTEBRAL DISCS OF CERVICAL SPINE: ICD-10-CM

## 2023-11-29 DIAGNOSIS — M50.30 DDD (DEGENERATIVE DISC DISEASE), CERVICAL: ICD-10-CM

## 2023-11-29 DIAGNOSIS — M19.011 OSTEOARTHRITIS OF RIGHT SHOULDER, UNSPECIFIED OSTEOARTHRITIS TYPE: ICD-10-CM

## 2023-11-29 DIAGNOSIS — M62.81 MUSCLE WEAKNESS OF RIGHT UPPER EXTREMITY: ICD-10-CM

## 2023-11-29 PROCEDURE — 97162 PT EVAL MOD COMPLEX 30 MIN: CPT

## 2023-11-29 NOTE — PLAN OF CARE
"[facility]  Physical Therapy Initial Evaluation    Name: Dari Valverde  Clinic Number: [id number]    Therapy Diagnosis:   Encounter Diagnoses   Name Primary?    Osteoarthritis of right shoulder, unspecified osteoarthritis type     DDD (degenerative disc disease), cervical     Decreased right shoulder range of motion Yes    Decreased range of motion of intervertebral discs of cervical spine     Muscle weakness of right upper extremity      Physician: Shanthi Cuellar FNP    Physician Orders: PT Eval and Treat  Medical Diagnosis from Referral: M19.011 (ICD-10-CM) - Osteoarthritis of right shoulder, unspecified osteoarthritis type and M50.30 (ICD-10-CM) - DDD (degenerative disc disease), cervical  Evaluation Date: 11/29/2023  Authorization Period Expiration: Only initial evaluation approved by [employer]. Subsequent visits require prior authorization.   Plan of Care Expiration: 1/12/2024  Visit # / Visits authorized: 1/Only initial evaluation approved by [employer]. Subsequent visits require prior authorization.     Time In: 0950  Time Out: 1015  Total Appointment Time (timed & untimed codes): 25 minutes    Precautions: Standard and Diabetes    Subjective     Date of onset: ~3 months ago    History of current condition - Nancy reports a gradual onset of right shoulder and left cervical pain that began ~3 months ago. Patient reports the pain has gradually gotten worse. Patient reports that pain has begun to radiate down her right upper extremity into her right hand. Patient also has numbness/tingling in the right upper extremity and currently has difficulty closing her right hand.      Falls: none    Imaging:     X-ray of the cervical spine from 11/14/2023: "Degenerative disc disease cervical spine"    X-ray of the right shoulder from 11/14/2023: "No acute findings. Moderate degenerative change."    Prior Therapy: none for presenting condition  Social History: lives with their " spouse  Occupation: retired  Driving: Yes  Durable Medical Equipment: none  Dominant Extremity: right  Affected Extremity: right  Prior Level of Function: independent with all self-care and ADLs  Current Level of Function: same as prior level of function but with increased time, effort, and pain in the right upper extremity    Pain:  Current 0/10, worst 8/10, best 0/10   Location: right shoulder with radiculopathy into the right upper extremity to the hand; left cervical   Description: Aching, Dull, Tingling, and Numb  Aggravating Factors: Night Time  Easing Factors: heating pad and movement of the right upper extremity    Pts goals: Patient wishes to decrease the pain in her right upper extremity and left cervical region to improve her quality of life.     Medical History:   Past Medical History:   Diagnosis Date    Diabetes     Hyperlipemia     Hypertension      Surgical History:   Dari Valverde  has a past surgical history that includes Cholecystectomy;  section; and Tubal ligation ().    Medications:   Dari has a current medication list which includes the following prescription(s): amlodipine, atorvastatin, blood sugar diagnostic, blood-glucose meter, lancets, losartan, and metformin.    Allergies:   Review of patient's allergies indicates:   Allergen Reactions    Lisinopril      Causes cough       Objective     Posture:   Forward head: yes  Thoracic curve: increased  Cervical curve: decreased  Laterally flexed: no  Rotated: no  Rounded shoulders: yes  Scoliosis: no  Shoulder height: equal  Clavical height: equal    Cervical range of motion:   AROM Pain/Dysfunction with Movement   Flexion 27*    Extension 18*    Right lateral flexion 25*    Left lateral flexion 24*    Right rotation 30*    Left rotation 55*      Shoulder range of motion:    Right  Left    Flexion (180) 45* active; 100* passive 120*   Internal rotation (functional) Mid buttock T8   External rotation (45) 44* active; 65*  "passive  64*   Abduction (180) 65* active; 80* passive 130*     Clinical Special Tests:   Compression test: Positive  Distraction: Negative     strength (average of 3): 38.7 lbs (left); unable to register right on  strength dynamometer    Right shoulder strength: grossly 3-/5    Limitation/Restriction for FOTO Intake Survey    Therapist reviewed FOTO scores for Dari Valverde on 11/29/2023.   FOTO documents entered into [employer] - see Media section.    Limitation Score: 51.3%       Patient Education and Home Exercises     Education provided: Patient educated on the importance of completing skilled physical therapy treatment and home exercise program as prescribed to maximize functional gains.     Written Home Exercises Provided: yes. Exercises were reviewed and Nancy was able to demonstrate them prior to the end of the session. Nancy demonstrated fair understanding of the education provided.     See EMR under "Patient Instructions" for exercises provided during therapy sessions.    Assessment     Dari is a 68 y.o. female referred to outpatient physical therapy with a medical diagnosis of M19.011 (ICD-10-CM) - Osteoarthritis of right shoulder, unspecified osteoarthritis type and M50.30 (ICD-10-CM) - DDD (degenerative disc disease), cervical. Pt presents to PT with left cervical and right shoulder pain radiating down the right upper extremity into the hand (numbness/tingling), decreased cervical and right shoulder range of motion, right upper extremity weakness, and abnormal cervicothoracic posture. Patient appears to have adhesive capsulitis of the right glenohumeral joint.    Pt prognosis is Good.   Pt will benefit from skilled outpatient Physical Therapy to address the deficits stated above and in the chart below, provide pt/family education, and to maximize pt's level of independence.     Plan of care discussed with patient: Yes  Pt's spiritual, cultural and educational needs considered and pt agreeable to " plan of care and goals as stated below:     Anticipated Barriers for therapy: compliance with home exercise program; pain tolerance    Medical Necessity is demonstrated by the following:  History  Co-morbidities and personal factors that may impact the plan of care [] LOW: no personal factors / co-morbidities  [x] MODERATE: 1-2 personal factors / co-morbidities  [] HIGH: 3+ personal factors / co-morbidities    Moderate / High Support Documentation:   Co-morbidities affecting plan of care: N/A    Personal Factors:   coping style     Examination  Body Structures and Functions, activity limitations and participation restrictions that may impact the plan of care [] LOW: addressing 1-2 elements  [x] MODERATE: 3+ elements  [] HIGH: 4+ elements (please support below)    Moderate / High Support Documentation: range of motion, strength, and posture     Clinical Presentation [] LOW: stable  [x] MODERATE: Evolving  [] HIGH: Unstable     Decision Making/ Complexity Score: moderate       Goals:    Short Term Goals:  Patient will demonstrate independence with home exercise program to ensure carryover of treatment.  Patient will demonstrate 35 degrees of cervical flexion/extension active range of motion to improve functional use of the cervical spine.  Patient will demonstrate 85 degrees of right shoulder flexion/abduction active range of motion to improve functional use of the right upper extremity.  Patient will demonstrate 55 degrees of right shoulder external rotation at 0 degrees abduction active range of motion to improve functional use of the right upper extremity.  Patient will demonstrate functional right shoulder internal rotation to L4 to improve functional use of the right upper extremity.  Patient will improve right upper extremity strength to 3/5 to improve functional use of the right upper extremity.   Patient will report a reduction in left cervical and right shoulder pain from 8/10 to 6/10 at worst to improve  quality of life.   Patient will improve right  strength to 15 lbs (average of 3 trials) on the  strength dynamometer to improve functional use of the right hand.    Long Term Goals:  Patient will demonstrate 120 degrees of right shoulder flexion/abduction active range of motion to improve functional use of the right upper extremity.  Patient will demonstrate 65 degrees of right shoulder external rotation at 0 degrees abduction active range of motion to improve functional use of the right upper extremity.  Patient will demonstrate functional right shoulder internal rotation to T8 to improve functional use of the right upper extremity.  Patient will improve right upper extremity strength to 4-/5 to improve functional use of the right upper extremity  Patient will report a reduction in left cervical and right shoulder pain from 8/10 to 4/10 at worst to improve quality of life.   Patient will improve right  strength to 30 lbs (average of 3 trials) on the  strength dynamometer to improve functional use of the right hand.    Plan     Plan of care Certification: 11/29/2023 to 1/12/2024.    Outpatient Physical Therapy 2 times weekly for 6 weeks to include the following interventions: Electrical Stimulation (IFC/premodulated IFC), Manual Therapy, Moist Heat/ Ice, Neuromuscular Re-ed, Patient Education, Therapeutic Activities, and Therapeutic Exercise.     Clinical Information for [employer] Authorization     Dates of Services: 11/29/2023 to 1/12/2023  Discharge Plan: Patient will be discharged from skilled physical therapy treatment once all goals have been met, patient has plateaued, or physician/insurance requests discontinuation of care. Patient will be discharged with a home exercise program.   Type of therapy: Rehabilitative  ICD-10 Diagnosis Code(s): M19.011 (ICD-10-CM) - Osteoarthritis of right shoulder, unspecified osteoarthritis type and M50.30 (ICD-10-CM) - DDD (degenerative disc disease),  cervical  Which side is symptomatic? Not applicable and/or symptoms are not localized  Surgical: No  Surgical procedure: N/A  Surgery date: N/A.  Presenting symptoms/diagnoses are unspecified in nature.  Presenting symptoms are radiating in nature.   The rehabilitation is not related to a diagnosis of cancer.  The rehabilitation is not related to a diagnosis of lymphedema.  Patient's clinical presentation is:  Severe objective and functional deficits: consistent intense symptoms with severe loss of range of motion, strength, or ability to perform daily tasks  CPT Codes Requested:  78007 [therapeutic exercise], 62290 [neuromuscular re-education], 59177 [manual therapy], 81941 [therapeutic activities], and 26020 [unattended electrical stimulation]      Payam Leung, PT, DPT  11/29/2023

## 2023-12-05 ENCOUNTER — CLINICAL SUPPORT (OUTPATIENT)
Dept: REHABILITATION | Facility: HOSPITAL | Age: 68
End: 2023-12-05
Payer: COMMERCIAL

## 2023-12-05 DIAGNOSIS — M62.81 MUSCLE WEAKNESS OF RIGHT UPPER EXTREMITY: ICD-10-CM

## 2023-12-05 DIAGNOSIS — M50.30 DDD (DEGENERATIVE DISC DISEASE), CERVICAL: Primary | ICD-10-CM

## 2023-12-05 DIAGNOSIS — M25.611 DECREASED RIGHT SHOULDER RANGE OF MOTION: ICD-10-CM

## 2023-12-05 DIAGNOSIS — M53.82 DECREASED RANGE OF MOTION OF INTERVERTEBRAL DISCS OF CERVICAL SPINE: ICD-10-CM

## 2023-12-05 DIAGNOSIS — M19.011 OSTEOARTHRITIS OF RIGHT SHOULDER, UNSPECIFIED OSTEOARTHRITIS TYPE: ICD-10-CM

## 2023-12-05 PROCEDURE — 97010 HOT OR COLD PACKS THERAPY: CPT

## 2023-12-05 PROCEDURE — 97110 THERAPEUTIC EXERCISES: CPT

## 2023-12-05 PROCEDURE — 97014 ELECTRIC STIMULATION THERAPY: CPT

## 2023-12-05 PROCEDURE — 97140 MANUAL THERAPY 1/> REGIONS: CPT

## 2023-12-05 NOTE — PROGRESS NOTES
[facility]  Physical Therapy Treatment Note    Name: Dari Valverde  Clinic Number: [id number]    Therapy Diagnosis:   Encounter Diagnoses   Name Primary?    DDD (degenerative disc disease), cervical Yes    Osteoarthritis of right shoulder, unspecified osteoarthritis type     Decreased right shoulder range of motion     Decreased range of motion of intervertebral discs of cervical spine     Muscle weakness of right upper extremity      Physician: Shanthi Cuellar FNP    Visit Date: 12/5/2023    Physician Orders: PT Eval and Treat  Medical Diagnosis from Referral: M19.011 (ICD-10-CM) - Osteoarthritis of right shoulder, unspecified osteoarthritis type and M50.30 (ICD-10-CM) - DDD (degenerative disc disease), cervical  Evaluation Date: 11/29/2023  Authorization Period Expiration: Only initial evaluation approved by [facility]. Subsequent visits require prior authorization.   Plan of Care Expiration: 1/12/2024  Visit # / Visits authorized: 1/13 (including initial evaluation; 14 authorized by [facility])  PTA Visit #: 0    Time In: 0848  Time Out: 0947  Total Billable Time: 0959 minutes    Precautions: Standard and Diabetes  Functional Level Prior to Evaluation: independent with all self-care and ADLs    Subjective     Pt reports: Her right shoulder does not bother her much at rest, but she has pain anytime she tries to lift her right arm. Patient reports she purchased over-the-door pulleys per Physical Therapist's recommendation and has begun working with them.    She was compliant with home exercise program.  Response to previous treatment: This is patient's first treatment session following initial evaluation.     Pain: 5/10  Location: right shoulder with movement    Objective     Nacny received therapeutic exercises to develop strength, endurance, ROM, flexibility, and posture for 20 minutes including:    UBE forwards/backwards: x 5 minutes  Pulleys (flexion): x 5 minutes  Counter  slides (right): x 10 reps  Supine cane flexion: x 10 reps    Nancy received the following manual therapy techniques for 19 minutes, including:    Myofascial release of the right shoulder and right upper extremity musculature to decrease pain and guarding  Grade 1-3 joint mobilizations of the right glenohumeral joint to improve range of motion and joint lubrication  Passive stretching of the right shoulder in all directions    Nancy participated in neuromuscular re-education activities to improve: Coordination and Posture for 5 minutes. The following activities were included:    Right scapular proprioceptive neuromuscular facilitation in left sidelying: 2 x 10 reps    Nancy participated in dynamic functional therapeutic activities to improve functional performance for 0 minutes, including:    (not performed)    Nancy received the following supervised modalities after being cleared for contradictions: Premodulated IFC Electrical Stimulation for pain control applied to the right shoulder. Pt received stimulation at a frequency of  Hz and an intensity of 10 mA for 15 minutes. Dari tolderated treatment well without any adverse effects.      Nancy received cold pack for 15 minutes to the right shoulder in conjunction with premodulated IFC electrical stimulation.     Right Shoulder AROM   Flexion 65 degrees   Abduction 75 degrees   External Rotation at 0 Degrees Abduction 55 degrees   Functional Internal Rotation Sacrum     Home Exercises Provided and Patient Education Provided     Education provided: Patient educated on the importance of completing skilled physical therapy treatment and home exercise program as prescribed to maximize functional gains.     Written Home Exercises Provided: Patient instructed to cont prior HEP. Exercises were reviewed and Nancy was able to demonstrate them prior to the end of the session. Nancy demonstrated good understanding of the education provided.     See EMR under Patient  Instructions for exercises provided prior visit.    Assessment     Patient with good effort throughout treatment. Patient with improvements in active and passive range of motion of the right shoulder this treatment. Patient reported being able to close her right fist post-treatment even though  strength was not specifically worked on today. Premodulated IFC electrical stimulation and an ice pack were applied to patient's right shoulder post-treatment to decrease pain and inflammation. Patient encouraged to work diligently on her home exercise program to continue the progress that is being made. Physical Therapist will continue to progress therapeutic exercise, neuromuscular re-education, and therapeutic activities as able with manual therapy and modalities utilized as needed.     Nancy isprogressing well towards her goals.   Pt prognosis is Good.     Pt will continue to benefit from skilled outpatient physical therapy to address the deficits listed in the problem list box on initial evaluation, provide pt/family education, and to maximize pt's level of independence in the home and community environment.     Pt's spiritual, cultural, and educational needs considered and pt agreeable to plan of care and goals.     Anticipated barriers to physical therapy: compliance with home exercise program; pain tolerance     Goals:    Short Term Goals:  Patient will demonstrate independence with home exercise program to ensure carryover of treatment.  Patient will demonstrate 35 degrees of cervical flexion/extension active range of motion to improve functional use of the cervical spine.  Patient will demonstrate 85 degrees of right shoulder flexion/abduction active range of motion to improve functional use of the right upper extremity.  Patient will demonstrate 55 degrees of right shoulder external rotation at 0 degrees abduction active range of motion to improve functional use of the right upper extremity.  Patient will  demonstrate functional right shoulder internal rotation to L4 to improve functional use of the right upper extremity.  Patient will improve right upper extremity strength to 3/5 to improve functional use of the right upper extremity.   Patient will report a reduction in left cervical and right shoulder pain from 8/10 to 6/10 at worst to improve quality of life.   Patient will improve right  strength to 15 lbs (average of 3 trials) on the  strength dynamometer to improve functional use of the right hand.     Long Term Goals:  Patient will demonstrate 120 degrees of right shoulder flexion/abduction active range of motion to improve functional use of the right upper extremity.  Patient will demonstrate 65 degrees of right shoulder external rotation at 0 degrees abduction active range of motion to improve functional use of the right upper extremity.  Patient will demonstrate functional right shoulder internal rotation to T8 to improve functional use of the right upper extremity.  Patient will improve right upper extremity strength to 4-/5 to improve functional use of the right upper extremity  Patient will report a reduction in left cervical and right shoulder pain from 8/10 to 4/10 at worst to improve quality of life.   Patient will improve right  strength to 30 lbs (average of 3 trials) on the  strength dynamometer to improve functional use of the right hand.    Plan     Patient will continue to benefit from skilled physical therapy treatment as prescribed working towards goals listed above to maximize functional potential.       Payam Leung, PT, DPT  12/5/2023

## 2023-12-08 ENCOUNTER — CLINICAL SUPPORT (OUTPATIENT)
Dept: REHABILITATION | Facility: HOSPITAL | Age: 68
End: 2023-12-08
Payer: COMMERCIAL

## 2023-12-08 DIAGNOSIS — M19.011 OSTEOARTHRITIS OF RIGHT SHOULDER, UNSPECIFIED OSTEOARTHRITIS TYPE: ICD-10-CM

## 2023-12-08 DIAGNOSIS — M62.81 MUSCLE WEAKNESS OF RIGHT UPPER EXTREMITY: ICD-10-CM

## 2023-12-08 DIAGNOSIS — M50.30 DDD (DEGENERATIVE DISC DISEASE), CERVICAL: Primary | ICD-10-CM

## 2023-12-08 DIAGNOSIS — M25.611 DECREASED RIGHT SHOULDER RANGE OF MOTION: ICD-10-CM

## 2023-12-08 DIAGNOSIS — M53.82 DECREASED RANGE OF MOTION OF INTERVERTEBRAL DISCS OF CERVICAL SPINE: ICD-10-CM

## 2023-12-08 PROCEDURE — 97112 NEUROMUSCULAR REEDUCATION: CPT | Mod: CQ

## 2023-12-08 PROCEDURE — 97110 THERAPEUTIC EXERCISES: CPT | Mod: CQ

## 2023-12-08 PROCEDURE — 97032 APPL MODALITY 1+ESTIM EA 15: CPT | Mod: CQ

## 2023-12-08 NOTE — PROGRESS NOTES
[facility]  Physical Therapy Treatment Note    Name: Dari Valverde  Clinic Number: [id number]    Therapy Diagnosis:   Encounter Diagnoses   Name Primary?    DDD (degenerative disc disease), cervical Yes    Osteoarthritis of right shoulder, unspecified osteoarthritis type     Decreased right shoulder range of motion     Decreased range of motion of intervertebral discs of cervical spine     Muscle weakness of right upper extremity      Physician: Shanthi Cuellar FNP    Visit Date: 12/8/2023    Physician Orders: PT Eval and Treat  Medical Diagnosis from Referral: M19.011 (ICD-10-CM) - Osteoarthritis of right shoulder, unspecified osteoarthritis type and M50.30 (ICD-10-CM) - DDD (degenerative disc disease), cervical  Evaluation Date: 11/29/2023  Authorization Period Expiration: Only initial evaluation approved by [facility]. Subsequent visits require prior authorization.   Plan of Care Expiration: 1/12/2024  Visit # / Visits authorized: 3/13 (including initial evaluation; 14 authorized by [facility])  PTA Visit #: 1    Time In: 0800  Time Out: 0849  Total Billable Time: 49 minutes    Precautions: Standard and Diabetes  Functional Level Prior to Evaluation: independent with all self-care and ADLs    Subjective     Pt reports: Her right shoulder does not bother her much at rest, but she has pain anytime she tries to lift her right arm.     She was compliant with home exercise program.  Response to previous treatment: This is patient's first treatment session following initial evaluation.     Pain: 6/10  Location: right shoulder with movement    Objective     Nancy received therapeutic exercises to develop strength, endurance, ROM, flexibility, and posture for 20 minutes including:  UBE forwards/backwards: x 5 minutes  Pulleys (flexion): x 5 minutes  Counter slides (right): x 15 reps  Red web squeezes: x 15 reps   Supine cane flexion: x 15 reps    Nancy received the following manual  therapy techniques for 5 minutes, including:  Myofascial release of the right shoulder and right upper extremity musculature to decrease pain and guarding  Grade 1-3 joint mobilizations of the right glenohumeral joint to improve range of motion and joint lubrication  Passive stretching of the right shoulder in all directions    Nancy participated in neuromuscular re-education activities to improve: Coordination and Posture for 9 minutes. The following activities were included:  Supine scapular retractions: 2 x 10 reps   Right scapular proprioceptive neuromuscular facilitation in left sidelying: 2 x 10 reps    Nancy participated in dynamic functional therapeutic activities to improve functional performance for 0 minutes, including:    (not performed)    Nancy received the following supervised modalities after being cleared for contradictions: Premodulated IFC Electrical Stimulation for pain control applied to the right shoulder. Pt received stimulation at a frequency of  Hz and an intensity of 10 mA for 15 minutes. Dari tolderated treatment well without any adverse effects.      Nancy received hot pack for 15 minutes to the right shoulder in conjunction with premodulated IFC electrical stimulation.     Right Shoulder AROM   Flexion 65 degrees   Abduction 75 degrees   External Rotation at 0 Degrees Abduction 55 degrees   Functional Internal Rotation Sacrum     Home Exercises Provided and Patient Education Provided     Education provided: Patient educated on the importance of completing skilled physical therapy treatment and home exercise program as prescribed to maximize functional gains.     Written Home Exercises Provided: Patient instructed to cont prior HEP. Exercises were reviewed and Nancy was able to demonstrate them prior to the end of the session. Nancy demonstrated good understanding of the education provided.     See EMR under Patient Instructions for exercises provided prior visit.    Assessment      Patient with good effort throughout treatment. IFC electrical stimulation and an hot pack were applied to patient's right shoulder post-treatment to decrease pain. Physical Therapist Assistant will continue to progress therapeutic exercise, neuromuscular re-education, and therapeutic activities as able with manual therapy and modalities utilized as needed.      Nancy isprogressing well towards her goals.   Pt prognosis is Good.     Pt will continue to benefit from skilled outpatient physical therapy to address the deficits listed in the problem list box on initial evaluation, provide pt/family education, and to maximize pt's level of independence in the home and community environment.     Pt's spiritual, cultural, and educational needs considered and pt agreeable to plan of care and goals.     Anticipated barriers to physical therapy: compliance with home exercise program; pain tolerance     Goals:    Short Term Goals:  Patient will demonstrate independence with home exercise program to ensure carryover of treatment.  Patient will demonstrate 35 degrees of cervical flexion/extension active range of motion to improve functional use of the cervical spine.  Patient will demonstrate 85 degrees of right shoulder flexion/abduction active range of motion to improve functional use of the right upper extremity.  Patient will demonstrate 55 degrees of right shoulder external rotation at 0 degrees abduction active range of motion to improve functional use of the right upper extremity.  Patient will demonstrate functional right shoulder internal rotation to L4 to improve functional use of the right upper extremity.  Patient will improve right upper extremity strength to 3/5 to improve functional use of the right upper extremity.   Patient will report a reduction in left cervical and right shoulder pain from 8/10 to 6/10 at worst to improve quality of life.   Patient will improve right  strength to 15 lbs (average of 3  trials) on the  strength dynamometer to improve functional use of the right hand.     Long Term Goals:  Patient will demonstrate 120 degrees of right shoulder flexion/abduction active range of motion to improve functional use of the right upper extremity.  Patient will demonstrate 65 degrees of right shoulder external rotation at 0 degrees abduction active range of motion to improve functional use of the right upper extremity.  Patient will demonstrate functional right shoulder internal rotation to T8 to improve functional use of the right upper extremity.  Patient will improve right upper extremity strength to 4-/5 to improve functional use of the right upper extremity  Patient will report a reduction in left cervical and right shoulder pain from 8/10 to 4/10 at worst to improve quality of life.   Patient will improve right  strength to 30 lbs (average of 3 trials) on the  strength dynamometer to improve functional use of the right hand.    Plan     Patient will continue to benefit from skilled physical therapy treatment as prescribed working towards goals listed above to maximize functional potential.       MYRA Lyons  12/8/2023

## 2023-12-12 ENCOUNTER — CLINICAL SUPPORT (OUTPATIENT)
Dept: REHABILITATION | Facility: HOSPITAL | Age: 68
End: 2023-12-12
Payer: COMMERCIAL

## 2023-12-12 DIAGNOSIS — M25.611 DECREASED RIGHT SHOULDER RANGE OF MOTION: ICD-10-CM

## 2023-12-12 DIAGNOSIS — M53.82 DECREASED RANGE OF MOTION OF INTERVERTEBRAL DISCS OF CERVICAL SPINE: ICD-10-CM

## 2023-12-12 DIAGNOSIS — M19.011 OSTEOARTHRITIS OF RIGHT SHOULDER, UNSPECIFIED OSTEOARTHRITIS TYPE: ICD-10-CM

## 2023-12-12 DIAGNOSIS — M50.30 DDD (DEGENERATIVE DISC DISEASE), CERVICAL: Primary | ICD-10-CM

## 2023-12-12 DIAGNOSIS — M62.81 MUSCLE WEAKNESS OF RIGHT UPPER EXTREMITY: ICD-10-CM

## 2023-12-12 PROCEDURE — 97140 MANUAL THERAPY 1/> REGIONS: CPT | Mod: CQ

## 2023-12-12 PROCEDURE — 97112 NEUROMUSCULAR REEDUCATION: CPT | Mod: CQ

## 2023-12-12 PROCEDURE — 97110 THERAPEUTIC EXERCISES: CPT | Mod: CQ

## 2023-12-12 PROCEDURE — 97010 HOT OR COLD PACKS THERAPY: CPT | Mod: CQ

## 2023-12-12 PROCEDURE — 97014 ELECTRIC STIMULATION THERAPY: CPT | Mod: CQ

## 2023-12-12 NOTE — PROGRESS NOTES
[facility]  Physical Therapy Treatment Note    Name: Dari Valvered  Clinic Number: [id number]    Therapy Diagnosis:   Encounter Diagnoses   Name Primary?    DDD (degenerative disc disease), cervical Yes    Osteoarthritis of right shoulder, unspecified osteoarthritis type     Decreased right shoulder range of motion     Decreased range of motion of intervertebral discs of cervical spine     Muscle weakness of right upper extremity        Physician: Shanthi Cuellar FNP    Visit Date: 12/12/2023    Physician Orders: PT Eval and Treat  Medical Diagnosis from Referral: M19.011 (ICD-10-CM) - Osteoarthritis of right shoulder, unspecified osteoarthritis type and M50.30 (ICD-10-CM) - DDD (degenerative disc disease), cervical  Evaluation Date: 11/29/2023  Authorization Period Expiration: 2/2/2024  Plan of Care Expiration: 1/12/2024  Visit # / Visits authorized: 4/13 (including initial evaluation; 14 authorized by [facility])  PTA Visit #: 2    Time In: 0759 (Patient treatment started prior to being checked in)  Time Out: 0901  Total Billable Time: 62 minutes    Precautions: Standard and Diabetes  Functional Level Prior to Evaluation: independent with all self-care and ADLs    Subjective     Pt reports: sometimes her shoulder hurts more than at other times; patient states her pain is usually worse at night and she has pain down into her right hand, but she does not know whether that is coming from the right shoulder or a separate injury; patient states has to complete therapy before a MRI will be approved by insurance    She was compliant with home exercise program.  Response to previous treatment: This is patient's first treatment session following initial evaluation.     Pain: 5/10  Location: right shoulder with movement    Objective     Nancy received therapeutic exercises to develop strength, endurance, ROM, flexibility, and posture for 24 minutes including:    UBE forwards/backwards:  x 5 minutes  Pulleys (flexion): x 5 minutes  Counter slides (right): x 15 reps  Red web squeezes: x 15 reps   Supine cane flexion: x 15 reps    Nancy received the following manual therapy techniques for 13 minutes, including:    Myofascial release of the right shoulder and right upper extremity musculature to decrease pain and guarding  Grade 1-3 joint mobilizations of the right glenohumeral joint to improve range of motion and joint lubrication  Passive stretching of the right shoulder in all directions    Nancy participated in neuromuscular re-education activities to improve: Coordination and Posture for 10 minutes. The following activities were included:    Supine scapular retractions: 2 x 10 reps   Right scapular proprioceptive neuromuscular facilitation in left sidelying: 2 x 10 reps    Nancy participated in dynamic functional therapeutic activities to improve functional performance for 0 minutes, including:    (not performed)    Nancy received the following supervised modalities after being cleared for contradictions: Premodulated IFC Electrical Stimulation for pain control applied to the right shoulder. Pt received stimulation at a frequency of  Hz and an intensity of 12 mA for 15 minutes. Dari tolderated treatment well without any adverse effects.      Nancy received cold pack for 15 minutes to the right shoulder in conjunction with premodulated IFC electrical stimulation.     Right Shoulder AROM   Flexion 65 degrees   Abduction 75 degrees   External Rotation at 0 Degrees Abduction 55 degrees   Functional Internal Rotation Sacrum     Home Exercises Provided and Patient Education Provided     Education provided: Patient educated on the importance of completing skilled physical therapy treatment and home exercise program as prescribed to maximize functional gains.     Written Home Exercises Provided: Patient instructed to cont prior HEP. Exercises were reviewed and Nancy was able to demonstrate them prior  to the end of the session. Nancy demonstrated good understanding of the education provided.     See EMR under Patient Instructions for exercises provided prior visit.    Assessment     Patient with good effort throughout treatment. Patient able to perform all exercises, but with most difficulty performing supine cane flexion. Patient is very limited with active range of motion of shoulder in all directions, but improving. Premodulated IFC electrical stimulation and a cold pack were applied to patient's right shoulder post-treatment to decrease pain. Physical Therapist Assistant will continue to progress therapeutic exercise, neuromuscular re-education, and therapeutic activities as able with manual therapy and modalities utilized as needed.      Nancy isprogressing well towards her goals.   Pt prognosis is Good.     Pt will continue to benefit from skilled outpatient physical therapy to address the deficits listed in the problem list box on initial evaluation, provide pt/family education, and to maximize pt's level of independence in the home and community environment.     Pt's spiritual, cultural, and educational needs considered and pt agreeable to plan of care and goals.     Anticipated barriers to physical therapy: compliance with home exercise program; pain tolerance     Goals:    Short Term Goals:  Patient will demonstrate independence with home exercise program to ensure carryover of treatment.  Patient will demonstrate 35 degrees of cervical flexion/extension active range of motion to improve functional use of the cervical spine.  Patient will demonstrate 85 degrees of right shoulder flexion/abduction active range of motion to improve functional use of the right upper extremity.  Patient will demonstrate 55 degrees of right shoulder external rotation at 0 degrees abduction active range of motion to improve functional use of the right upper extremity.  Patient will demonstrate functional right shoulder internal  rotation to L4 to improve functional use of the right upper extremity.  Patient will improve right upper extremity strength to 3/5 to improve functional use of the right upper extremity.   Patient will report a reduction in left cervical and right shoulder pain from 8/10 to 6/10 at worst to improve quality of life.   Patient will improve right  strength to 15 lbs (average of 3 trials) on the  strength dynamometer to improve functional use of the right hand.     Long Term Goals:  Patient will demonstrate 120 degrees of right shoulder flexion/abduction active range of motion to improve functional use of the right upper extremity.  Patient will demonstrate 65 degrees of right shoulder external rotation at 0 degrees abduction active range of motion to improve functional use of the right upper extremity.  Patient will demonstrate functional right shoulder internal rotation to T8 to improve functional use of the right upper extremity.  Patient will improve right upper extremity strength to 4-/5 to improve functional use of the right upper extremity  Patient will report a reduction in left cervical and right shoulder pain from 8/10 to 4/10 at worst to improve quality of life.   Patient will improve right  strength to 30 lbs (average of 3 trials) on the  strength dynamometer to improve functional use of the right hand.    Plan     Patient will continue to benefit from skilled physical therapy treatment as prescribed working towards goals listed above to maximize functional potential.       MYRA Hodgson  12/12/2023

## 2023-12-14 ENCOUNTER — CLINICAL SUPPORT (OUTPATIENT)
Dept: REHABILITATION | Facility: HOSPITAL | Age: 68
End: 2023-12-14
Payer: COMMERCIAL

## 2023-12-14 DIAGNOSIS — M25.611 DECREASED RIGHT SHOULDER RANGE OF MOTION: ICD-10-CM

## 2023-12-14 DIAGNOSIS — M50.30 DDD (DEGENERATIVE DISC DISEASE), CERVICAL: Primary | ICD-10-CM

## 2023-12-14 DIAGNOSIS — M19.011 OSTEOARTHRITIS OF RIGHT SHOULDER, UNSPECIFIED OSTEOARTHRITIS TYPE: ICD-10-CM

## 2023-12-14 DIAGNOSIS — M53.82 DECREASED RANGE OF MOTION OF INTERVERTEBRAL DISCS OF CERVICAL SPINE: ICD-10-CM

## 2023-12-14 DIAGNOSIS — M62.81 MUSCLE WEAKNESS OF RIGHT UPPER EXTREMITY: ICD-10-CM

## 2023-12-14 PROCEDURE — 97032 APPL MODALITY 1+ESTIM EA 15: CPT | Mod: CQ

## 2023-12-14 PROCEDURE — 97140 MANUAL THERAPY 1/> REGIONS: CPT | Mod: CQ

## 2023-12-14 PROCEDURE — 97110 THERAPEUTIC EXERCISES: CPT | Mod: CQ

## 2023-12-14 NOTE — PROGRESS NOTES
[facility]  Physical Therapy Treatment Note    Name: Dari Valverde  Clinic Number: [id number]    Therapy Diagnosis:   No diagnosis found.    Physician: Shanthi Cuellar FNP    Visit Date: 12/14/2023    Physician Orders: PT Eval and Treat  Medical Diagnosis from Referral: M19.011 (ICD-10-CM) - Osteoarthritis of right shoulder, unspecified osteoarthritis type and M50.30 (ICD-10-CM) - DDD (degenerative disc disease), cervical  Evaluation Date: 11/29/2023  Authorization Period Expiration: Only initial evaluation approved by [facility]. Subsequent visits require prior authorization.   Plan of Care Expiration: 1/12/2024  Visit # / Visits authorized: 5/13 (including initial evaluation; 14 authorized by [facility])  PTA Visit #: 3    Time In: 0801  Time Out: 0852  Total Billable Time: 51 minutes    Precautions: Standard and Diabetes  Functional Level Prior to Evaluation: independent with all self-care and ADLs    Subjective     Pt reports: Her right shoulder does not bother her much at rest, but she has pain anytime she tries to lift her right arm.     She was compliant with home exercise program.  Response to previous treatment: This is patient's first treatment session following initial evaluation.     Pain: 6/10  Location: right shoulder with movement    Objective     Nancy received therapeutic exercises to develop strength, endurance, ROM, flexibility, and posture for 20 minutes including:  UBE forwards/backwards: x 5 minutes  Pulleys (flexion): x 5 minutes  Counter slides (right): x 15 reps  Red web squeezes: x 15 reps   Supine cane flexion: x 15 reps    Nancy received the following manual therapy techniques for 10 minutes, including:   Myofascial release of the right shoulder and right upper extremity musculature to decrease pain and guarding  Grade 1-3 joint mobilizations of the right glenohumeral joint to improve range of motion and joint lubrication  Passive stretching of the right  shoulder in all directions    Nancy participated in neuromuscular re-education activities to improve: Coordination and Posture for 6 minutes. The following activities were included:  Supine scapular retractions: 2 x 10 reps   Right scapular proprioceptive neuromuscular facilitation in left sidelying: 2 x 10 reps    Nancy participated in dynamic functional therapeutic activities to improve functional performance for 0 minutes, including:    (not performed)    Nancy received the following supervised modalities after being cleared for contradictions: Premodulated IFC Electrical Stimulation for pain control applied to the right shoulder. Pt received stimulation at a frequency of  Hz and an intensity of 10 mA for 15 minutes. Dari tolderated treatment well without any adverse effects.      Nancy received hot pack for 15 minutes to the right shoulder in conjunction with premodulated IFC electrical stimulation.     Right Shoulder AROM   Flexion 65 degrees   Abduction 75 degrees   External Rotation at 0 Degrees Abduction 55 degrees   Functional Internal Rotation Sacrum     Home Exercises Provided and Patient Education Provided     Education provided: Patient educated on the importance of completing skilled physical therapy treatment and home exercise program as prescribed to maximize functional gains.     Written Home Exercises Provided: Patient instructed to cont prior HEP. Exercises were reviewed and Nancy was able to demonstrate them prior to the end of the session. Nancy demonstrated good understanding of the education provided.     See EMR under Patient Instructions for exercises provided prior visit.    Assessment     Patient with good effort throughout treatment, but reports feeling as if her shoulder has gotten worse since starting therapy. IFC electrical stimulation and an hot pack were applied to patient's right shoulder post-treatment to decrease pain. Physical Therapist Assistant will continue to progress  therapeutic exercise, neuromuscular re-education, and therapeutic activities as able with manual therapy and modalities utilized as needed.    Nancy isprogressing well towards her goals.   Pt prognosis is Good.     Pt will continue to benefit from skilled outpatient physical therapy to address the deficits listed in the problem list box on initial evaluation, provide pt/family education, and to maximize pt's level of independence in the home and community environment.     Pt's spiritual, cultural, and educational needs considered and pt agreeable to plan of care and goals.     Anticipated barriers to physical therapy: compliance with home exercise program; pain tolerance     Goals:    Short Term Goals:  Patient will demonstrate independence with home exercise program to ensure carryover of treatment.  Patient will demonstrate 35 degrees of cervical flexion/extension active range of motion to improve functional use of the cervical spine.  Patient will demonstrate 85 degrees of right shoulder flexion/abduction active range of motion to improve functional use of the right upper extremity.  Patient will demonstrate 55 degrees of right shoulder external rotation at 0 degrees abduction active range of motion to improve functional use of the right upper extremity.  Patient will demonstrate functional right shoulder internal rotation to L4 to improve functional use of the right upper extremity.  Patient will improve right upper extremity strength to 3/5 to improve functional use of the right upper extremity.   Patient will report a reduction in left cervical and right shoulder pain from 8/10 to 6/10 at worst to improve quality of life.   Patient will improve right  strength to 15 lbs (average of 3 trials) on the  strength dynamometer to improve functional use of the right hand.     Long Term Goals:  Patient will demonstrate 120 degrees of right shoulder flexion/abduction active range of motion to improve functional  use of the right upper extremity.  Patient will demonstrate 65 degrees of right shoulder external rotation at 0 degrees abduction active range of motion to improve functional use of the right upper extremity.  Patient will demonstrate functional right shoulder internal rotation to T8 to improve functional use of the right upper extremity.  Patient will improve right upper extremity strength to 4-/5 to improve functional use of the right upper extremity  Patient will report a reduction in left cervical and right shoulder pain from 8/10 to 4/10 at worst to improve quality of life.   Patient will improve right  strength to 30 lbs (average of 3 trials) on the  strength dynamometer to improve functional use of the right hand.    Plan     Patient will continue to benefit from skilled physical therapy treatment as prescribed working towards goals listed above to maximize functional potential.       MYRA Lyons  12/14/2023

## 2023-12-18 ENCOUNTER — CLINICAL SUPPORT (OUTPATIENT)
Dept: REHABILITATION | Facility: HOSPITAL | Age: 68
End: 2023-12-18
Payer: COMMERCIAL

## 2023-12-18 DIAGNOSIS — M19.011 OSTEOARTHRITIS OF RIGHT SHOULDER, UNSPECIFIED OSTEOARTHRITIS TYPE: ICD-10-CM

## 2023-12-18 DIAGNOSIS — M25.611 DECREASED RIGHT SHOULDER RANGE OF MOTION: ICD-10-CM

## 2023-12-18 DIAGNOSIS — M50.30 DDD (DEGENERATIVE DISC DISEASE), CERVICAL: Primary | ICD-10-CM

## 2023-12-18 DIAGNOSIS — M53.82 DECREASED RANGE OF MOTION OF INTERVERTEBRAL DISCS OF CERVICAL SPINE: ICD-10-CM

## 2023-12-18 DIAGNOSIS — M62.81 MUSCLE WEAKNESS OF RIGHT UPPER EXTREMITY: ICD-10-CM

## 2023-12-18 PROCEDURE — 97110 THERAPEUTIC EXERCISES: CPT | Mod: CQ

## 2023-12-18 PROCEDURE — 97032 APPL MODALITY 1+ESTIM EA 15: CPT | Mod: CQ

## 2023-12-18 PROCEDURE — 97140 MANUAL THERAPY 1/> REGIONS: CPT | Mod: CQ

## 2023-12-18 NOTE — PROGRESS NOTES
[facility]  Physical Therapy Treatment Note    Name: Dari Valverde  Clinic Number: [id number]    Therapy Diagnosis:   No diagnosis found.    Physician: Shanthi Cuellar FNP    Visit Date: 12/18/2023    Physician Orders: PT Eval and Treat  Medical Diagnosis from Referral: M19.011 (ICD-10-CM) - Osteoarthritis of right shoulder, unspecified osteoarthritis type and M50.30 (ICD-10-CM) - DDD (degenerative disc disease), cervical  Evaluation Date: 11/29/2023  Authorization Period Expiration: Only initial evaluation approved by [facility]. Subsequent visits require prior authorization.   Plan of Care Expiration: 1/12/2024  Visit # / Visits authorized: 6/13 (including initial evaluation; 14 authorized by [facility])  PTA Visit #: 4    Time In: 0803  Time Out: 0852 (co-treatment for 5 minutes)    Total Billable Time: 49 minutes    Precautions: Standard and Diabetes  Functional Level Prior to Evaluation: independent with all self-care and ADLs    Subjective     Pt reports: Her right shoulder is hurting about a 9/10 this morning.     She was compliant with home exercise program.  Response to previous treatment: This is patient's first treatment session following initial evaluation.     Pain: 9/10  Location: right shoulder with movement    Objective     Nancy received therapeutic exercises to develop strength, endurance, ROM, flexibility, and posture for 15 minutes including:  UBE forwards/backwards: x 5 minutes  Pulleys (flexion): x 5 minutes  Counter slides (right): x 15 reps  Red web squeezes: x 15 reps   Supine cane flexion: x 15 reps    Nancy received the following manual therapy techniques for 10 minutes, including:   Myofascial release of the right shoulder and right upper extremity musculature to decrease pain and guarding  Grade 1-3 joint mobilizations of the right glenohumeral joint to improve range of motion and joint lubrication  Passive stretching of the right shoulder in all  directions    Nancy participated in neuromuscular re-education activities to improve: Coordination and Posture for 9 minutes. The following activities were included:  Supine scapular retractions: 2 x 10 reps   Right scapular proprioceptive neuromuscular facilitation in left sidelying: 2 x 10 reps    Nancy participated in dynamic functional therapeutic activities to improve functional performance for 0 minutes, including:  (not performed)    Nancy received the following supervised modalities after being cleared for contradictions: Premodulated IFC Electrical Stimulation for pain control applied to the right shoulder. Pt received stimulation at a frequency of  Hz and an intensity of 10 mA for 15 minutes. Dari tolderated treatment well without any adverse effects.      Nancy received hot pack for 15 minutes to the right shoulder in conjunction with premodulated IFC electrical stimulation.     Right Shoulder AROM   Flexion 65 degrees   Abduction 75 degrees   External Rotation at 0 Degrees Abduction 55 degrees   Functional Internal Rotation Sacrum     Home Exercises Provided and Patient Education Provided     Education provided: Patient educated on the importance of completing skilled physical therapy treatment and home exercise program as prescribed to maximize functional gains.     Written Home Exercises Provided: Patient instructed to cont prior HEP. Exercises were reviewed and Nancy was able to demonstrate them prior to the end of the session. Nancy demonstrated good understanding of the education provided.     See EMR under Patient Instructions for exercises provided prior visit.    Assessment     Patient with good effort throughout treatment today. Patient was able to tolerate all exercises and manual with her biggest struggle being supine cane flexion. IFC electrical stimulation and an hot pack were applied to patient's right shoulder post-treatment to decrease pain. Physical Therapist Assistant will continue  to progress therapeutic exercise, neuromuscular re-education, and therapeutic activities as able with manual therapy and modalities utilized as needed.     Nancy isprogressing well towards her goals.   Pt prognosis is Good.     Pt will continue to benefit from skilled outpatient physical therapy to address the deficits listed in the problem list box on initial evaluation, provide pt/family education, and to maximize pt's level of independence in the home and community environment.     Pt's spiritual, cultural, and educational needs considered and pt agreeable to plan of care and goals.     Anticipated barriers to physical therapy: compliance with home exercise program; pain tolerance     Goals:    Short Term Goals:  Patient will demonstrate independence with home exercise program to ensure carryover of treatment.  Patient will demonstrate 35 degrees of cervical flexion/extension active range of motion to improve functional use of the cervical spine.  Patient will demonstrate 85 degrees of right shoulder flexion/abduction active range of motion to improve functional use of the right upper extremity.  Patient will demonstrate 55 degrees of right shoulder external rotation at 0 degrees abduction active range of motion to improve functional use of the right upper extremity.  Patient will demonstrate functional right shoulder internal rotation to L4 to improve functional use of the right upper extremity.  Patient will improve right upper extremity strength to 3/5 to improve functional use of the right upper extremity.   Patient will report a reduction in left cervical and right shoulder pain from 8/10 to 6/10 at worst to improve quality of life.   Patient will improve right  strength to 15 lbs (average of 3 trials) on the  strength dynamometer to improve functional use of the right hand.     Long Term Goals:  Patient will demonstrate 120 degrees of right shoulder flexion/abduction active range of motion to improve  functional use of the right upper extremity.  Patient will demonstrate 65 degrees of right shoulder external rotation at 0 degrees abduction active range of motion to improve functional use of the right upper extremity.  Patient will demonstrate functional right shoulder internal rotation to T8 to improve functional use of the right upper extremity.  Patient will improve right upper extremity strength to 4-/5 to improve functional use of the right upper extremity  Patient will report a reduction in left cervical and right shoulder pain from 8/10 to 4/10 at worst to improve quality of life.   Patient will improve right  strength to 30 lbs (average of 3 trials) on the  strength dynamometer to improve functional use of the right hand.    Plan     Patient will continue to benefit from skilled physical therapy treatment as prescribed working towards goals listed above to maximize functional potential.       MYRA Lyons  12/18/2023

## 2023-12-21 ENCOUNTER — CLINICAL SUPPORT (OUTPATIENT)
Dept: REHABILITATION | Facility: HOSPITAL | Age: 68
End: 2023-12-21
Payer: COMMERCIAL

## 2023-12-21 DIAGNOSIS — M50.30 DDD (DEGENERATIVE DISC DISEASE), CERVICAL: Primary | ICD-10-CM

## 2023-12-21 DIAGNOSIS — M62.81 MUSCLE WEAKNESS OF RIGHT UPPER EXTREMITY: ICD-10-CM

## 2023-12-21 DIAGNOSIS — M19.011 OSTEOARTHRITIS OF RIGHT SHOULDER, UNSPECIFIED OSTEOARTHRITIS TYPE: ICD-10-CM

## 2023-12-21 DIAGNOSIS — M25.611 DECREASED RIGHT SHOULDER RANGE OF MOTION: ICD-10-CM

## 2023-12-21 DIAGNOSIS — M53.82 DECREASED RANGE OF MOTION OF INTERVERTEBRAL DISCS OF CERVICAL SPINE: ICD-10-CM

## 2023-12-21 PROCEDURE — 97140 MANUAL THERAPY 1/> REGIONS: CPT | Mod: CQ

## 2023-12-21 PROCEDURE — 97110 THERAPEUTIC EXERCISES: CPT | Mod: CQ

## 2023-12-21 PROCEDURE — 97014 ELECTRIC STIMULATION THERAPY: CPT | Mod: CQ

## 2023-12-21 PROCEDURE — 97010 HOT OR COLD PACKS THERAPY: CPT | Mod: CQ

## 2023-12-21 PROCEDURE — 97112 NEUROMUSCULAR REEDUCATION: CPT | Mod: CQ

## 2023-12-21 NOTE — PROGRESS NOTES
[facility]  Physical Therapy Treatment Note    Name: Dari Valverde  Clinic Number: [id number]    Therapy Diagnosis:   Encounter Diagnoses   Name Primary?    DDD (degenerative disc disease), cervical Yes    Osteoarthritis of right shoulder, unspecified osteoarthritis type     Decreased right shoulder range of motion     Decreased range of motion of intervertebral discs of cervical spine     Muscle weakness of right upper extremity        Physician: Shanthi Cuellar FNP    Visit Date: 12/21/2023    Physician Orders: PT Eval and Treat  Medical Diagnosis from Referral: M19.011 (ICD-10-CM) - Osteoarthritis of right shoulder, unspecified osteoarthritis type and M50.30 (ICD-10-CM) - DDD (degenerative disc disease), cervical  Evaluation Date: 11/29/2023  Authorization Period Expiration: 2/2/2024   Plan of Care Expiration: 1/12/2024  Visit # / Visits authorized: 7/13 (including initial evaluation; [facility]  PTA Visit #: 5    Time In: 0800  Time Out: 0859   Total Billable Time: 59 minutes    Precautions: Standard and Diabetes  Functional Level Prior to Evaluation: independent with all self-care and ADLs    Subjective     Pt reports: she has no pain with her shoulder at rest, but her pain increases when she moves her arm    She was compliant with home exercise program.    Pain: 7/10  Location: right shoulder with movement    Objective     Nancy received therapeutic exercises to develop strength, endurance, ROM, flexibility, and posture for 20 minutes including:    UBE forwards/backwards: x 5 minutes  Pulleys (flexion): x 5 minutes  Counter slides (right): x 15 reps x 5 sec hold  Green web squeezes: x 20 reps   Supine cane flexion: x 15 reps x 5 sec hold (patient with difficulty getting to 90 degrees of shoulder flexion)    Nancy received the following manual therapy techniques for 15 minutes, including:     Myofascial release of the right shoulder and right upper extremity  musculature to decrease pain and guarding  Grade 1-3 joint mobilizations of the right glenohumeral joint to improve range of motion and joint lubrication  Passive stretching of the right shoulder in all directions    Nancy participated in neuromuscular re-education activities to improve: Coordination and Posture for 9 minutes. The following activities were included:    Supine scapular retractions: 2 x 10 reps ; verbal/tactile cues to perform correctly  Right scapular proprioceptive neuromuscular facilitation in left sidelying: 2 x 10 reps    Nancy received the following supervised modalities after being cleared for contradictions: Premodulated IFC Electrical Stimulation for pain control applied to the right shoulder. Pt received stimulation at a frequency of  Hz and an intensity of 10 mA for 15 minutes. Dari tolderated treatment well without any adverse effects.      Nancy received hot pack for 15 minutes to the right shoulder in conjunction with premodulated IFC electrical stimulation.     Right Shoulder AROM   Flexion 65 degrees   Abduction 75 degrees   External Rotation at 0 Degrees Abduction 55 degrees   Functional Internal Rotation Sacrum     Home Exercises Provided and Patient Education Provided     Education provided: Patient educated on the importance of completing skilled physical therapy treatment and home exercise program as prescribed to maximize functional gains.     Written Home Exercises Provided: Patient instructed to cont prior HEP. Exercises were reviewed and Nancy was able to demonstrate them prior to the end of the session. Nancy demonstrated good understanding of the education provided.     See EMR under Patient Instructions for exercises provided prior visit.    Assessment     Patient with good effort overall. Patient's right shoulder range of motion is still very limited, but was able to get her to approx 110 degrees of passive shoulder flexion. Patient is very guarded and painful with  movement of shoulder. Patient with good tolerance of manual stretching of right shoulder in all directions. Patient with most difficulty performing active shoulder flexion with heavy compensations (shoulder hike noted). IFC electrical stimulation and an hot pack were applied to patient's right shoulder post-treatment to decrease pain. Physical Therapist Assistant will continue to progress therapeutic exercise, neuromuscular re-education, and therapeutic activities as able with manual therapy and modalities utilized as needed.     Nancy isprogressing well towards her goals.   Pt prognosis is Good.     Pt will continue to benefit from skilled outpatient physical therapy to address the deficits listed in the problem list box on initial evaluation, provide pt/family education, and to maximize pt's level of independence in the home and community environment.     Pt's spiritual, cultural, and educational needs considered and pt agreeable to plan of care and goals.     Anticipated barriers to physical therapy: compliance with home exercise program; pain tolerance     Goals:    Short Term Goals:  Patient will demonstrate independence with home exercise program to ensure carryover of treatment.  Patient will demonstrate 35 degrees of cervical flexion/extension active range of motion to improve functional use of the cervical spine.  Patient will demonstrate 85 degrees of right shoulder flexion/abduction active range of motion to improve functional use of the right upper extremity.  Patient will demonstrate 55 degrees of right shoulder external rotation at 0 degrees abduction active range of motion to improve functional use of the right upper extremity.  Patient will demonstrate functional right shoulder internal rotation to L4 to improve functional use of the right upper extremity.  Patient will improve right upper extremity strength to 3/5 to improve functional use of the right upper extremity.   Patient will report a  reduction in left cervical and right shoulder pain from 8/10 to 6/10 at worst to improve quality of life.   Patient will improve right  strength to 15 lbs (average of 3 trials) on the  strength dynamometer to improve functional use of the right hand.     Long Term Goals:  Patient will demonstrate 120 degrees of right shoulder flexion/abduction active range of motion to improve functional use of the right upper extremity.  Patient will demonstrate 65 degrees of right shoulder external rotation at 0 degrees abduction active range of motion to improve functional use of the right upper extremity.  Patient will demonstrate functional right shoulder internal rotation to T8 to improve functional use of the right upper extremity.  Patient will improve right upper extremity strength to 4-/5 to improve functional use of the right upper extremity  Patient will report a reduction in left cervical and right shoulder pain from 8/10 to 4/10 at worst to improve quality of life.   Patient will improve right  strength to 30 lbs (average of 3 trials) on the  strength dynamometer to improve functional use of the right hand.    Plan     Patient will continue to benefit from skilled physical therapy treatment as prescribed working towards goals listed above to maximize functional potential.       MYRA Hodgson  12/21/2023

## 2023-12-26 ENCOUNTER — CLINICAL SUPPORT (OUTPATIENT)
Dept: REHABILITATION | Facility: HOSPITAL | Age: 68
End: 2023-12-26
Payer: COMMERCIAL

## 2023-12-26 DIAGNOSIS — M19.011 OSTEOARTHRITIS OF RIGHT SHOULDER, UNSPECIFIED OSTEOARTHRITIS TYPE: ICD-10-CM

## 2023-12-26 DIAGNOSIS — M53.82 DECREASED RANGE OF MOTION OF INTERVERTEBRAL DISCS OF CERVICAL SPINE: ICD-10-CM

## 2023-12-26 DIAGNOSIS — M50.30 DDD (DEGENERATIVE DISC DISEASE), CERVICAL: Primary | ICD-10-CM

## 2023-12-26 DIAGNOSIS — M25.611 DECREASED RIGHT SHOULDER RANGE OF MOTION: ICD-10-CM

## 2023-12-26 DIAGNOSIS — M62.81 MUSCLE WEAKNESS OF RIGHT UPPER EXTREMITY: ICD-10-CM

## 2023-12-26 PROCEDURE — 97112 NEUROMUSCULAR REEDUCATION: CPT

## 2023-12-26 PROCEDURE — 97110 THERAPEUTIC EXERCISES: CPT

## 2023-12-26 PROCEDURE — 97140 MANUAL THERAPY 1/> REGIONS: CPT

## 2023-12-26 NOTE — PROGRESS NOTES
[facility]  Physical Therapy Treatment Note    Name: Dari Valverde  Clinic Number: [id number]    Therapy Diagnosis:   Encounter Diagnoses   Name Primary?    DDD (degenerative disc disease), cervical Yes    Osteoarthritis of right shoulder, unspecified osteoarthritis type     Decreased right shoulder range of motion     Decreased range of motion of intervertebral discs of cervical spine     Muscle weakness of right upper extremity      Physician: Shanthi Cuellar FNP    Visit Date: 12/26/2023    Physician Orders: PT Eval and Treat  Medical Diagnosis from Referral: M19.011 (ICD-10-CM) - Osteoarthritis of right shoulder, unspecified osteoarthritis type and M50.30 (ICD-10-CM) - DDD (degenerative disc disease), cervical  Evaluation Date: 11/29/2023  Authorization Period Expiration: 2/2/2024   Plan of Care Expiration: 1/12/2024  Visit # / Visits authorized: 8/13 (including initial evaluation; 14 [facility])  PTA Visit #: 0    Time In: 0849  Time Out: 0940   Total Billable Time: 51 minutes    Precautions: Standard and Diabetes  Functional Level Prior to Evaluation: independent with all self-care and ADLs    Subjective     Pt reports: She is concerned as her pain, especially in the mornings, is not improving, and may actually be getting worse. Patient reports she has noticed increased pain in her left shoulder and bilateral knees, especially in the mornings. Patient reports she feels better as the day progresses. Patient reports she plans to make a follow-up appointment with CORINE Ledezma to discuss other options.     She was compliant with home exercise program.    Pain: 8/10  Location: right shoulder    Objective     Nancy received therapeutic exercises to develop strength, endurance, ROM, flexibility, and posture for 19 minutes including:    UBE forwards/backwards: x 5 minutes  Pulleys (flexion): x 5 minutes  Green web squeezes: x 20 reps   Supine cane flexion: x 12 reps  (following manual therapy; verbal cues to increase speed)  Standing cane flexion on corner: x 3 reps (verbal cues to increase speed)    Counter slides (right): x 15 reps x 5 sec hold (not performed)     Nancy received the following manual therapy techniques for 24 minutes, including:     Myofascial release of the right shoulder and right upper extremity musculature to decrease pain and muscle guarding  Grade 1-3 joint mobilizations of the right glenohumeral joint to improve range of motion and joint lubrication  Passive stretching of the right shoulder in all directions    Nancy participated in neuromuscular re-education activities to improve: Coordination and Posture for 8 minutes. The following activities were included:    Supine scapular retractions: x 17 reps  Right scapular proprioceptive neuromuscular facilitation in left sidelying: 2 x 10 reps    Nancy received the following supervised modalities after being cleared for contradictions: Premodulated IFC Electrical Stimulation for pain control applied to the right shoulder. Pt received stimulation at a frequency of  Hz and an intensity of 10 mA for 0 minutes. Dari tolderated treatment well without any adverse effects.      Nancy received hot pack for 0 minutes to the right shoulder in conjunction with premodulated IFC electrical stimulation.     Right Shoulder AROM   Flexion 52 degrees   Abduction 63 degrees   External Rotation at 0 Degrees Abduction 60 degrees   Functional Internal Rotation Sacrum     Home Exercises Provided and Patient Education Provided     Education provided: Patient educated on the importance of completing skilled physical therapy treatment and home exercise program as prescribed to maximize functional gains.     Written Home Exercises Provided: Patient instructed to cont prior HEP. Exercises were reviewed and Nancy was able to demonstrate them prior to the end of the session. Nancy demonstrated good understanding of the education  provided.     See EMR under Patient Instructions for exercises provided prior visit.    Assessment     Patient with a tendency to anticipate pain and move very slowly, especially with right shoulder flexion and abduction activities. Patient has made slight improvements with cervical and right upper extremity range of motion since beginning physical therapy treatment but continues to remain very limited. Patient with a reduction in left  strength since initial evaluation. Patient reports she initially scheduled all of her physical therapy sessions in the mornings because she wanted to be seen while she was at her worst. Patient advised to reschedule some of her physical therapy treatment sessions for the afternoons to maximize the functional use of her upper extremities at points when her pain is not as severe. Patient denied the need for IFC electrical stimulation/moist heat post-treatment as she understands this is only a short-term pain relief option. Patient advised to schedule a follow-up appointment with CORINE Ledezma to determine whether or not a referral to orthopedics might be warranted. Physical Therapist will continue to progress therapeutic exercise, neuromuscular re-education, therapeutic activities, and gait training as able with manual therapy and modalities utilized as needed.     Nancy isprogressing well towards her goals.   Pt prognosis is Good.     Pt will continue to benefit from skilled outpatient physical therapy to address the deficits listed in the problem list box on initial evaluation, provide pt/family education, and to maximize pt's level of independence in the home and community environment.     Pt's spiritual, cultural, and educational needs considered and pt agreeable to plan of care and goals.     Anticipated barriers to physical therapy: compliance with home exercise program; pain tolerance     Goals:    Short Term Goals:  Patient will demonstrate independence with home exercise  program to ensure carryover of treatment. [Met; will continue to progress as indicated]  Patient will demonstrate 35 degrees of cervical flexion/extension active range of motion to improve functional use of the cervical spine. [Met]  Patient will demonstrate 85 degrees of right shoulder flexion/abduction active range of motion to improve functional use of the right upper extremity. [Not met: 52 degrees flexion; 63 degrees abduction]  Patient will demonstrate 55 degrees of right shoulder external rotation at 0 degrees abduction active range of motion to improve functional use of the right upper extremity. [Met; 60 degrees]  Patient will demonstrate functional right shoulder internal rotation to L4 to improve functional use of the right upper extremity. [Not met: sacrum]  Patient will improve right upper extremity strength to 3/5 to improve functional use of the right upper extremity. [Not met]  Patient will report a reduction in left cervical and right shoulder pain from 8/10 to 6/10 at worst to improve quality of life. [Not met]   Patient will improve right  strength to 15 lbs (average of 3 trials) on the  strength dynamometer to improve functional use of the right hand. [Not met: undetectable on dynamometer]     Long Term Goals:  Patient will demonstrate 120 degrees of right shoulder flexion/abduction active range of motion to improve functional use of the right upper extremity.   Patient will demonstrate 65 degrees of right shoulder external rotation at 0 degrees abduction active range of motion to improve functional use of the right upper extremity.  Patient will demonstrate functional right shoulder internal rotation to T8 to improve functional use of the right upper extremity.  Patient will improve right upper extremity strength to 4-/5 to improve functional use of the right upper extremity  Patient will report a reduction in left cervical and right shoulder pain from 8/10 to 4/10 at worst to improve  quality of life.   Patient will improve right  strength to 30 lbs (average of 3 trials) on the  strength dynamometer to improve functional use of the right hand.    Plan     Patient will continue to benefit from skilled physical therapy treatment as prescribed working towards goals listed above to maximize functional potential.       Payam Leung, PT, DPT  12/26/2023

## 2023-12-28 ENCOUNTER — CLINICAL SUPPORT (OUTPATIENT)
Dept: REHABILITATION | Facility: HOSPITAL | Age: 68
End: 2023-12-28
Payer: COMMERCIAL

## 2023-12-28 ENCOUNTER — PATIENT MESSAGE (OUTPATIENT)
Dept: ADMINISTRATIVE | Facility: HOSPITAL | Age: 68
End: 2023-12-28

## 2023-12-28 DIAGNOSIS — M19.011 OSTEOARTHRITIS OF RIGHT SHOULDER, UNSPECIFIED OSTEOARTHRITIS TYPE: ICD-10-CM

## 2023-12-28 DIAGNOSIS — M50.30 DDD (DEGENERATIVE DISC DISEASE), CERVICAL: Primary | ICD-10-CM

## 2023-12-28 DIAGNOSIS — M25.611 DECREASED RIGHT SHOULDER RANGE OF MOTION: ICD-10-CM

## 2023-12-28 DIAGNOSIS — M53.82 DECREASED RANGE OF MOTION OF INTERVERTEBRAL DISCS OF CERVICAL SPINE: ICD-10-CM

## 2023-12-28 DIAGNOSIS — M62.81 MUSCLE WEAKNESS OF RIGHT UPPER EXTREMITY: ICD-10-CM

## 2023-12-28 PROCEDURE — 97110 THERAPEUTIC EXERCISES: CPT

## 2023-12-28 PROCEDURE — 97140 MANUAL THERAPY 1/> REGIONS: CPT

## 2023-12-28 PROCEDURE — 97112 NEUROMUSCULAR REEDUCATION: CPT

## 2023-12-28 NOTE — PROGRESS NOTES
[facility]  Physical Therapy Treatment Note    Name: Dari Valverde  Clinic Number: [id number]    Therapy Diagnosis:   Encounter Diagnoses   Name Primary?    DDD (degenerative disc disease), cervical Yes    Osteoarthritis of right shoulder, unspecified osteoarthritis type      Decreased right shoulder range of motion      Decreased range of motion of intervertebral discs of cervical spine      Muscle weakness of right upper extremity      Physician: Shanthi Cuellar FNP    Visit Date: 12/28/2023    Physician Orders: PT Eval and Treat  Medical Diagnosis from Referral: M19.011 (ICD-10-CM) - Osteoarthritis of right shoulder, unspecified osteoarthritis type and M50.30 (ICD-10-CM) - DDD (degenerative disc disease), cervical  Evaluation Date: 11/29/2023  Authorization Period Expiration: 2/2/2024   Plan of Care Expiration: 1/12/2024  Visit # / Visits authorized: 9/13 (including initial evaluation; 14 authorized by [facility])  PTA Visit #: 0    Time In: 1232  Time Out: 1322   Total Billable Time: 54 minutes    Precautions: Standard and Diabetes  Functional Level Prior to Evaluation: independent with all self-care and ADLs    Subjective     Pt reports: Patient's appointment is later in the day today than it usually is. Patient reports her shoulder is not as stiff due to the time of day, but she is still having pain. Patient reports she has an appointment set up with CORINE Ledezma on 1/2/2024.     She was compliant with home exercise program.    Pain: 6/10  Location: right shoulder    Objective     Nancy received therapeutic exercises to develop strength, endurance, ROM, flexibility, and posture for 15 minutes including:    UBE forwards/backwards: x 5 minutes  Supine cane flexion: x 20 reps (verbal cues to increase speed)  Standing cane flexion on corner: x 3 reps (verbal cues to increase speed)    Pulleys (flexion): x 5 minutes (not performed)   Green web squeezes: x 20 reps (not  performed)   Counter slides (right): x 15 reps x 5 sec hold (not performed)     Nancy received the following manual therapy techniques for 24 minutes, including:     Myofascial release of the right shoulder and right upper extremity musculature to decrease pain and muscle guarding  Grade 1-3 joint mobilizations of the right glenohumeral joint and radiocarpal to improve range of motion and joint lubrication  Passive stretching of the right shoulder in all directions  Passive stretching and joint mobilizations of the right 4th and 5th fingers to improve flexion    Nancy participated in neuromuscular re-education activities to improve: Coordination and Posture for 15 minutes. The following activities were included:    Supine right shoulder flexion active range of motion (guided by Physical Therapist): x 15 reps   Standing scapular retractions with upper extremity extension: red theraband; x 20 reps  Standing rows: red theraband; x 20 reps    Supine scapular retractions: x 17 reps (not performed)   Right scapular proprioceptive neuromuscular facilitation in left sidelying: 2 x 10 reps (not performed)     Nancy received the following supervised modalities after being cleared for contradictions: Premodulated IFC Electrical Stimulation for pain control applied to the right shoulder. Pt received stimulation at a frequency of  Hz and an intensity of 10 mA for 0 minutes. Dari tolderated treatment well without any adverse effects.      Nancy received hot pack for 0 minutes to the right shoulder in conjunction with premodulated IFC electrical stimulation.     Right Shoulder AROM   Flexion 65 degrees   Abduction 63 degrees   External Rotation at 0 Degrees Abduction 70 degrees   Functional Internal Rotation Sacrum     Home Exercises Provided and Patient Education Provided     Education provided: Patient educated on the importance of completing skilled physical therapy treatment and home exercise program as prescribed to  maximize functional gains.     Written Home Exercises Provided: Patient instructed to cont prior HEP. Exercises were reviewed and Nancy was able to demonstrate them prior to the end of the session. Nancy demonstrated good understanding of the education provided.     See EMR under Patient Instructions for exercises provided prior visit.    Assessment     Patient with improvements noted in right shoulder flexion active range of motion this treatment compared to earlier this week. Patient remains very guarded with passive right shoulder flexion above 90 degrees. Patient with good tolerance of addition of scapular exercises with resistance bands. Patient provided with yellow theraputty for home use to increase right  and pinch strength. Patient with significantly limited right wrist flexion/extension and 4th/5th digit flexion active range of motion. Patient follows-up with CORINE Ledezma on 1/2/2024 to determine whether or not a referral to orthopedics is warranted. Physical Therapist will continue to progress therapeutic exercise, neuromuscular re-education, therapeutic activities, and gait training as able with manual therapy and modalities utilized as needed.     Nancy is progressing well towards her goals.   Pt prognosis is Good.     Pt will continue to benefit from skilled outpatient physical therapy to address the deficits listed in the problem list box on initial evaluation, provide pt/family education, and to maximize pt's level of independence in the home and community environment.     Pt's spiritual, cultural, and educational needs considered and pt agreeable to plan of care and goals.     Anticipated barriers to physical therapy: compliance with home exercise program; pain tolerance     Goals:    Short Term Goals:  Patient will demonstrate independence with home exercise program to ensure carryover of treatment. [Met; will continue to progress as indicated]  Patient will demonstrate 35 degrees of cervical  flexion/extension active range of motion to improve functional use of the cervical spine. [Met]  Patient will demonstrate 85 degrees of right shoulder flexion/abduction active range of motion to improve functional use of the right upper extremity. [Not met: 52 degrees flexion; 63 degrees abduction]  Patient will demonstrate 55 degrees of right shoulder external rotation at 0 degrees abduction active range of motion to improve functional use of the right upper extremity. [Met; 60 degrees]  Patient will demonstrate functional right shoulder internal rotation to L4 to improve functional use of the right upper extremity. [Not met: sacrum]  Patient will improve right upper extremity strength to 3/5 to improve functional use of the right upper extremity. [Not met]  Patient will report a reduction in left cervical and right shoulder pain from 8/10 to 6/10 at worst to improve quality of life. [Not met]   Patient will improve right  strength to 15 lbs (average of 3 trials) on the  strength dynamometer to improve functional use of the right hand. [Not met: undetectable on dynamometer]     Long Term Goals:  Patient will demonstrate 120 degrees of right shoulder flexion/abduction active range of motion to improve functional use of the right upper extremity.   Patient will demonstrate 65 degrees of right shoulder external rotation at 0 degrees abduction active range of motion to improve functional use of the right upper extremity.  Patient will demonstrate functional right shoulder internal rotation to T8 to improve functional use of the right upper extremity.  Patient will improve right upper extremity strength to 4-/5 to improve functional use of the right upper extremity  Patient will report a reduction in left cervical and right shoulder pain from 8/10 to 4/10 at worst to improve quality of life.   Patient will improve right  strength to 30 lbs (average of 3 trials) on the  strength dynamometer to improve  functional use of the right hand.    Plan     Patient will continue to benefit from skilled physical therapy treatment as prescribed working towards goals listed above to maximize functional potential.       Payam Leung, PT, DPT  12/28/2023

## 2024-01-02 ENCOUNTER — CLINICAL SUPPORT (OUTPATIENT)
Dept: REHABILITATION | Facility: HOSPITAL | Age: 69
End: 2024-01-02
Payer: COMMERCIAL

## 2024-01-02 DIAGNOSIS — M53.82 DECREASED RANGE OF MOTION OF INTERVERTEBRAL DISCS OF CERVICAL SPINE: ICD-10-CM

## 2024-01-02 DIAGNOSIS — M50.30 DDD (DEGENERATIVE DISC DISEASE), CERVICAL: Primary | ICD-10-CM

## 2024-01-02 DIAGNOSIS — M25.611 DECREASED RIGHT SHOULDER RANGE OF MOTION: ICD-10-CM

## 2024-01-02 DIAGNOSIS — M19.011 OSTEOARTHRITIS OF RIGHT SHOULDER, UNSPECIFIED OSTEOARTHRITIS TYPE: ICD-10-CM

## 2024-01-02 DIAGNOSIS — M62.81 MUSCLE WEAKNESS OF RIGHT UPPER EXTREMITY: ICD-10-CM

## 2024-01-02 PROCEDURE — 97140 MANUAL THERAPY 1/> REGIONS: CPT

## 2024-01-02 PROCEDURE — 97112 NEUROMUSCULAR REEDUCATION: CPT

## 2024-01-02 PROCEDURE — 97110 THERAPEUTIC EXERCISES: CPT

## 2024-01-02 NOTE — PROGRESS NOTES
[facility]  Physical Therapy Treatment Note    Name: Dari Valverde  Clinic Number: [id number]    Therapy Diagnosis:   Encounter Diagnoses   Name Primary?    DDD (degenerative disc disease), cervical Yes    Osteoarthritis of right shoulder, unspecified osteoarthritis type      Decreased right shoulder range of motion      Decreased range of motion of intervertebral discs of cervical spine      Muscle weakness of right upper extremity      Physician: Shanthi Cuellar FNP    Visit Date: 1/2/2024    Physician Orders: PT Eval and Treat  Medical Diagnosis from Referral: M19.011 (ICD-10-CM) - Osteoarthritis of right shoulder, unspecified osteoarthritis type and M50.30 (ICD-10-CM) - DDD (degenerative disc disease), cervical  Evaluation Date: 11/29/2023  Authorization Period Expiration: 2/2/2024   Plan of Care Expiration: 1/12/2024  Visit # / Visits authorized: 10/13 (including initial evaluation; 14 [facility])  PTA Visit #: 0    Time In: 1231  Time Out: 1320   Total Billable Time: 49 minutes    Precautions: Standard and Diabetes  Functional Level Prior to Evaluation: independent with all self-care and ADLs    Subjective     Pt reports: Patient reports she feels as though the pain she was having only on the right shoulder/wrist/hand is now occurring on the left as well (but not as intense). Patient reports her appointment with CORINE Ledezma has been rescheduled from today to next Monday (1/8/2024).     She was compliant with home exercise program.    Pain: 5/10; 7/10  Location: right shoulder; right hand/wrist, respectively    Objective     Nancy received therapeutic exercises to develop strength, endurance, ROM, flexibility, and posture for 20 minutes including:    UBE forwards/backwards: x 5 minutes  Supine cane flexion: x 20 reps (verbal cues to increase speed)  Counter slides (right) for shoulder flexion: x 20 reps with 5 second holds   Table slides (right) for shoulder  abduction: x 20 reps with 5 second holds  Standing cane flexion on corner: x 15 reps    Pulleys (flexion): x 5 minutes (not performed)   Green web squeezes: x 20 reps (not performed)     Nancy received the following manual therapy techniques for 10 minutes, including:     Myofascial release of the right shoulder and right upper extremity musculature to decrease pain and muscle guarding  Grade 1-3 joint mobilizations of the right glenohumeral joint and radiocarpal to improve range of motion and joint lubrication  Passive stretching of the right shoulder in all directions  Manual traction of the cervical spine: x 10 reps with 10 second holds    Passive stretching and joint mobilizations of the right 4th and 5th fingers to improve flexion (not performed)     Nancy participated in neuromuscular re-education activities to improve: Coordination and Posture for 11 minutes. The following activities were included:    Supine scapular retraction isometrics: x 20 reps  Standing scapular retractions with upper extremity extension: red theraband; x 20 reps  Standing rows: red theraband; x 20 reps    Supine right shoulder flexion active range of motion (guided by Physical Therapist): x 15 reps (not performed)   Right scapular proprioceptive neuromuscular facilitation in left sidelying: 2 x 10 reps (not performed)     Nancy received the following supervised modalities after being cleared for contradictions: Premodulated IFC Electrical Stimulation for pain control applied to the right shoulder. Pt received stimulation at a frequency of  Hz and an intensity of 10 mA for 0 minutes. Dari tolderated treatment well without any adverse effects.    Nancy received hot pack for 0 minutes to the right shoulder in conjunction with premodulated IFC electrical stimulation.    Nancy received paraffin wax to the right hand/wrist x 8 minutes to decrease pain/stiffness.     Right Shoulder AROM   Flexion 72 degrees   Abduction 70 degrees    External Rotation at 0 Degrees Abduction 70 degrees   Functional Internal Rotation Sacrum     Home Exercises Provided and Patient Education Provided     Education provided: Patient educated on the importance of completing skilled physical therapy treatment and home exercise program as prescribed to maximize functional gains.     Written Home Exercises Provided: Patient instructed to cont prior HEP. Exercises were reviewed and Nancy was able to demonstrate them prior to the end of the session. Nancy demonstrated good understanding of the education provided.     See EMR under Patient Instructions for exercises provided prior visit.    Assessment     Patient with improvements noted in right shoulder flexion and abduction active range of motion this treatment compared to last week. Paraffin applied to the right hand/wrist post-treatment to decrease pain/stiffness with reports of decreased pain/stiffness following. Patient reported decreased pain in her cervical region following manual therapy to the cervical spine. Patient follows-up with CORINE Ledezma on 1/8/2024 to determine whether or not a referral to orthopedics vs orthopedic spine is warranted. Physical Therapist will continue to progress therapeutic exercise, neuromuscular re-education, therapeutic activities, and gait training as able with manual therapy and modalities utilized as needed.    Nancy is progressing well towards her goals.   Pt prognosis is Good.     Pt will continue to benefit from skilled outpatient physical therapy to address the deficits listed in the problem list box on initial evaluation, provide pt/family education, and to maximize pt's level of independence in the home and community environment.     Pt's spiritual, cultural, and educational needs considered and pt agreeable to plan of care and goals.     Anticipated barriers to physical therapy: compliance with home exercise program; pain tolerance     Goals:    Short Term Goals:  Patient  will demonstrate independence with home exercise program to ensure carryover of treatment. [Met; will continue to progress as indicated]  Patient will demonstrate 35 degrees of cervical flexion/extension active range of motion to improve functional use of the cervical spine. [Met]  Patient will demonstrate 85 degrees of right shoulder flexion/abduction active range of motion to improve functional use of the right upper extremity. [Not met: 52 degrees flexion; 63 degrees abduction]  Patient will demonstrate 55 degrees of right shoulder external rotation at 0 degrees abduction active range of motion to improve functional use of the right upper extremity. [Met; 60 degrees]  Patient will demonstrate functional right shoulder internal rotation to L4 to improve functional use of the right upper extremity. [Not met: sacrum]  Patient will improve right upper extremity strength to 3/5 to improve functional use of the right upper extremity. [Not met]  Patient will report a reduction in left cervical and right shoulder pain from 8/10 to 6/10 at worst to improve quality of life. [Not met]   Patient will improve right  strength to 15 lbs (average of 3 trials) on the  strength dynamometer to improve functional use of the right hand. [Not met: undetectable on dynamometer]     Long Term Goals:  Patient will demonstrate 120 degrees of right shoulder flexion/abduction active range of motion to improve functional use of the right upper extremity.   Patient will demonstrate 65 degrees of right shoulder external rotation at 0 degrees abduction active range of motion to improve functional use of the right upper extremity.  Patient will demonstrate functional right shoulder internal rotation to T8 to improve functional use of the right upper extremity.  Patient will improve right upper extremity strength to 4-/5 to improve functional use of the right upper extremity  Patient will report a reduction in left cervical and right  shoulder pain from 8/10 to 4/10 at worst to improve quality of life.   Patient will improve right  strength to 30 lbs (average of 3 trials) on the  strength dynamometer to improve functional use of the right hand.    Plan     Patient will continue to benefit from skilled physical therapy treatment as prescribed working towards goals listed above to maximize functional potential.       Payam Leung, PT, DPT  1/2/2024

## 2024-01-04 ENCOUNTER — CLINICAL SUPPORT (OUTPATIENT)
Dept: REHABILITATION | Facility: HOSPITAL | Age: 69
End: 2024-01-04
Payer: COMMERCIAL

## 2024-01-04 DIAGNOSIS — M50.30 DDD (DEGENERATIVE DISC DISEASE), CERVICAL: Primary | ICD-10-CM

## 2024-01-04 DIAGNOSIS — M53.82 DECREASED RANGE OF MOTION OF INTERVERTEBRAL DISCS OF CERVICAL SPINE: ICD-10-CM

## 2024-01-04 DIAGNOSIS — M19.011 OSTEOARTHRITIS OF RIGHT SHOULDER, UNSPECIFIED OSTEOARTHRITIS TYPE: ICD-10-CM

## 2024-01-04 DIAGNOSIS — M25.611 DECREASED RIGHT SHOULDER RANGE OF MOTION: ICD-10-CM

## 2024-01-04 DIAGNOSIS — M62.81 MUSCLE WEAKNESS OF RIGHT UPPER EXTREMITY: ICD-10-CM

## 2024-01-04 PROCEDURE — 97110 THERAPEUTIC EXERCISES: CPT | Mod: CQ

## 2024-01-04 PROCEDURE — 97112 NEUROMUSCULAR REEDUCATION: CPT | Mod: CQ

## 2024-01-04 PROCEDURE — 97018 PARAFFIN BATH THERAPY: CPT | Mod: CQ

## 2024-01-04 PROCEDURE — 97140 MANUAL THERAPY 1/> REGIONS: CPT | Mod: CQ

## 2024-01-04 NOTE — PROGRESS NOTES
[facility] OUTPATIENT REHABILITATION  Physical Therapy Treatment Note    Name: Dari Valverde  Clinic Number: [id number]    Therapy Diagnosis:   Encounter Diagnoses   Name Primary?    DDD (degenerative disc disease), cervical Yes    Osteoarthritis of right shoulder, unspecified osteoarthritis type      Decreased right shoulder range of motion      Decreased range of motion of intervertebral discs of cervical spine      Muscle weakness of right upper extremity      Physician: Shanthi Cuellar FNP    Visit Date: 1/4/2024    Physician Orders: PT Eval and Treat  Medical Diagnosis from Referral: M19.011 (ICD-10-CM) - Osteoarthritis of right shoulder, unspecified osteoarthritis type and M50.30 (ICD-10-CM) - DDD (degenerative disc disease), cervical  Evaluation Date: 11/29/2023  Authorization Period Expiration: 2/2/2024   Plan of Care Expiration: 1/12/2024  Visit # / Visits authorized: 11/13 (including initial evaluation; 14 authorized by [facility])  PTA Visit #: 1    Time In: 0800  Time Out: 0854   Total Billable Time: 54 minutes    Precautions: Standard and Diabetes  Functional Level Prior to Evaluation: independent with all self-care and ADLs    Subjective     Pt reports: her pain is better this morning and she feels like the pain is coming from her neck because she has had decreased pain since last treatment     She was compliant with home exercise program.    Pain: 3/10; 5/10  Location: right shoulder; right hand/wrist, respectively    Objective     Nancy received therapeutic exercises to develop strength, endurance, ROM, flexibility, and posture for 20 minutes including:    UBE forwards/backwards: x 5 minutes  Supine cane flexion: x 20 reps (verbal cues to increase speed)  Supine chin tucks: x 20 reps  Counter slides (right) for shoulder flexion: x 20 reps with 5 second holds   Table slides (right) for shoulder abduction: x 20 reps with 5 second holds  Standing cane flexion on corner: x 15 reps    Pulleys  (flexion): x 5 minutes (not performed)   Green web squeezes: x 20 reps (not performed)     Nancy received the following manual therapy techniques for 15 minutes, including:     Myofascial release of the right shoulder and right upper extremity musculature to decrease pain and muscle guarding  Grade 1-3 joint mobilizations of the right glenohumeral joint and radiocarpal to improve range of motion and joint lubrication  Passive stretching of the right shoulder in all directions  Myofascial release to cervical musculature to decrease pain and improve function  Manual traction of the cervical spine: x 10 reps with 10 second holds    Passive stretching and joint mobilizations of the right 4th and 5th fingers to improve flexion (not performed)     Nancy participated in neuromuscular re-education activities to improve: Coordination and Posture for 11 minutes. The following activities were included:    Supine scapular retraction isometrics: x 20 reps  Standing scapular retractions with upper extremity extension: red theraband; x 20 reps  Standing rows: red theraband; x 20 reps    Supine right shoulder flexion active range of motion (guided by Physical Therapist): x 15 reps (not performed)   Right scapular proprioceptive neuromuscular facilitation in left sidelying: 2 x 10 reps (not performed)     Nancy received the following supervised modalities after being cleared for contradictions: Premodulated IFC Electrical Stimulation for pain control applied to the right shoulder. Pt received stimulation at a frequency of  Hz and an intensity of 10 mA for 0 minutes. Dari tolderated treatment well without any adverse effects.    Nancy received hot pack for 0 minutes to the right shoulder in conjunction with premodulated IFC electrical stimulation.    Nancy received paraffin wax to the right hand/wrist x 8 minutes to decrease pain/stiffness.     Right Shoulder AROM   Flexion 72 degrees   Abduction 70 degrees   External Rotation  at 0 Degrees Abduction 70 degrees   Functional Internal Rotation Sacrum     Home Exercises Provided and Patient Education Provided     Education provided: Patient educated on the importance of completing skilled physical therapy treatment and home exercise program as prescribed to maximize functional gains.     Written Home Exercises Provided: Patient instructed to cont prior HEP. Exercises were reviewed and Nancy was able to demonstrate them prior to the end of the session. Nancy demonstrated good understanding of the education provided.     See EMR under Patient Instructions for exercises provided prior visit.    Assessment     Patient with good overall effort. Patient able to perform all exercises with no complaints. Paraffin applied to the right hand/wrist post-treatment to decrease pain/stiffness with reports of decreased pain/stiffness following. Patient reported decreased pain in her cervical region following manual therapy to the cervical spine after last session, so continued with manual this session. Patient follows-up with CORINE Ledezma on 1/8/2024 to determine whether or not a referral to orthopedics vs orthopedic spine is warranted. Physical Therapist Assistant will continue to progress therapeutic exercise, neuromuscular re-education, therapeutic activities, and gait training as able with manual therapy and modalities utilized as needed.    Nancy is progressing well towards her goals.   Pt prognosis is Good.     Pt will continue to benefit from skilled outpatient physical therapy to address the deficits listed in the problem list box on initial evaluation, provide pt/family education, and to maximize pt's level of independence in the home and community environment.     Pt's spiritual, cultural, and educational needs considered and pt agreeable to plan of care and goals.     Anticipated barriers to physical therapy: compliance with home exercise program; pain tolerance     Goals:    Short Term  Goals:  Patient will demonstrate independence with home exercise program to ensure carryover of treatment. [Met; will continue to progress as indicated]  Patient will demonstrate 35 degrees of cervical flexion/extension active range of motion to improve functional use of the cervical spine. [Met]  Patient will demonstrate 85 degrees of right shoulder flexion/abduction active range of motion to improve functional use of the right upper extremity. [Not met: 52 degrees flexion; 63 degrees abduction]  Patient will demonstrate 55 degrees of right shoulder external rotation at 0 degrees abduction active range of motion to improve functional use of the right upper extremity. [Met; 60 degrees]  Patient will demonstrate functional right shoulder internal rotation to L4 to improve functional use of the right upper extremity. [Not met: sacrum]  Patient will improve right upper extremity strength to 3/5 to improve functional use of the right upper extremity. [Not met]  Patient will report a reduction in left cervical and right shoulder pain from 8/10 to 6/10 at worst to improve quality of life. [Not met]   Patient will improve right  strength to 15 lbs (average of 3 trials) on the  strength dynamometer to improve functional use of the right hand. [Not met: undetectable on dynamometer]     Long Term Goals:  Patient will demonstrate 120 degrees of right shoulder flexion/abduction active range of motion to improve functional use of the right upper extremity.   Patient will demonstrate 65 degrees of right shoulder external rotation at 0 degrees abduction active range of motion to improve functional use of the right upper extremity.  Patient will demonstrate functional right shoulder internal rotation to T8 to improve functional use of the right upper extremity.  Patient will improve right upper extremity strength to 4-/5 to improve functional use of the right upper extremity  Patient will report a reduction in left cervical  and right shoulder pain from 8/10 to 4/10 at worst to improve quality of life.   Patient will improve right  strength to 30 lbs (average of 3 trials) on the  strength dynamometer to improve functional use of the right hand.    Plan     Patient will continue to benefit from skilled physical therapy treatment as prescribed working towards goals listed above to maximize functional potential.       MYRA Hodgson  1/4/2024

## 2024-01-08 ENCOUNTER — OFFICE VISIT (OUTPATIENT)
Dept: FAMILY MEDICINE | Facility: CLINIC | Age: 69
End: 2024-01-08
Payer: COMMERCIAL

## 2024-01-08 VITALS
HEIGHT: 62 IN | BODY MASS INDEX: 30.34 KG/M2 | RESPIRATION RATE: 14 BRPM | WEIGHT: 164.88 LBS | SYSTOLIC BLOOD PRESSURE: 177 MMHG | HEART RATE: 93 BPM | DIASTOLIC BLOOD PRESSURE: 92 MMHG

## 2024-01-08 DIAGNOSIS — M25.511 RIGHT SHOULDER PAIN, UNSPECIFIED CHRONICITY: ICD-10-CM

## 2024-01-08 DIAGNOSIS — I10 ESSENTIAL HYPERTENSION: ICD-10-CM

## 2024-01-08 DIAGNOSIS — M47.812 ARTHROPATHY OF CERVICAL FACET JOINT: ICD-10-CM

## 2024-01-08 DIAGNOSIS — M79.602 BILATERAL ARM PAIN: ICD-10-CM

## 2024-01-08 DIAGNOSIS — M54.2 CERVICALGIA: Primary | ICD-10-CM

## 2024-01-08 DIAGNOSIS — M54.12 CERVICAL RADICULOPATHY: ICD-10-CM

## 2024-01-08 DIAGNOSIS — M47.812 CERVICAL SPONDYLOSIS: ICD-10-CM

## 2024-01-08 DIAGNOSIS — M79.601 BILATERAL ARM PAIN: ICD-10-CM

## 2024-01-08 DIAGNOSIS — E11.9 TYPE 2 DIABETES MELLITUS WITHOUT COMPLICATION, WITHOUT LONG-TERM CURRENT USE OF INSULIN: Chronic | ICD-10-CM

## 2024-01-08 PROCEDURE — 1160F RVW MEDS BY RX/DR IN RCRD: CPT | Mod: ,,, | Performed by: NURSE PRACTITIONER

## 2024-01-08 PROCEDURE — 3080F DIAST BP >= 90 MM HG: CPT | Mod: ,,, | Performed by: NURSE PRACTITIONER

## 2024-01-08 PROCEDURE — 86038 ANTINUCLEAR ANTIBODIES: CPT | Mod: ,,, | Performed by: CLINICAL MEDICAL LABORATORY

## 2024-01-08 PROCEDURE — 86430 RHEUMATOID FACTOR TEST QUAL: CPT | Mod: ,,, | Performed by: CLINICAL MEDICAL LABORATORY

## 2024-01-08 PROCEDURE — 1125F AMNT PAIN NOTED PAIN PRSNT: CPT | Mod: ,,, | Performed by: NURSE PRACTITIONER

## 2024-01-08 PROCEDURE — 3008F BODY MASS INDEX DOCD: CPT | Mod: ,,, | Performed by: NURSE PRACTITIONER

## 2024-01-08 PROCEDURE — 99214 OFFICE O/P EST MOD 30 MIN: CPT | Mod: ,,, | Performed by: NURSE PRACTITIONER

## 2024-01-08 PROCEDURE — 85025 COMPLETE CBC W/AUTO DIFF WBC: CPT | Mod: ,,, | Performed by: CLINICAL MEDICAL LABORATORY

## 2024-01-08 PROCEDURE — 1159F MED LIST DOCD IN RCRD: CPT | Mod: ,,, | Performed by: NURSE PRACTITIONER

## 2024-01-08 PROCEDURE — 85651 RBC SED RATE NONAUTOMATED: CPT | Mod: ,,, | Performed by: CLINICAL MEDICAL LABORATORY

## 2024-01-08 PROCEDURE — 80053 COMPREHEN METABOLIC PANEL: CPT | Mod: ,,, | Performed by: CLINICAL MEDICAL LABORATORY

## 2024-01-08 PROCEDURE — 3077F SYST BP >= 140 MM HG: CPT | Mod: ,,, | Performed by: NURSE PRACTITIONER

## 2024-01-08 RX ORDER — LANCETS 33 GAUGE
EACH MISCELLANEOUS
COMMUNITY
Start: 2023-11-15

## 2024-01-08 RX ORDER — GABAPENTIN 100 MG/1
CAPSULE ORAL
Qty: 60 CAPSULE | Refills: 2 | Status: SHIPPED | OUTPATIENT
Start: 2024-01-08 | End: 2024-01-17

## 2024-01-08 NOTE — PROGRESS NOTES
Clinic note     Patient name: Dari Valverde is a 68 y.o. female   Chief compliant   Chief Complaint   Patient presents with    Arm Pain    Edema     Pt states her hands are swelling       Subjective     History of present illness   In clinic for evaluation of ongoing right arm pain, she also reports intermittent swelling and tingling to bilateral hands/fingers   She states neck pain has been radiating into right arm, she states pain has now starting radiating into left upper arm. She continues to have limited ROM of right shoulder   She has completed physical therapy for right shoulder pain and is now participating in PT for cervicalgia    Past Medical History: HTN, Type 2 DM, HLD     Last A1c 6.4, this will be rechecked today   Checking blood glucose: BID and prn   Blood glucose log not in clinic           Social History     Tobacco Use    Smoking status: Never     Passive exposure: Never    Smokeless tobacco: Never   Substance Use Topics    Alcohol use: Never    Drug use: Never       Review of patient's allergies indicates:   Allergen Reactions    Lisinopril      Causes cough        Past Medical History:   Diagnosis Date    Diabetes     Hyperlipemia     Hypertension        Past Surgical History:   Procedure Laterality Date     SECTION      CHOLECYSTECTOMY      TUBAL LIGATION  1982        Family History   Problem Relation Age of Onset    Dementia Mother     Heart disease Father     Stroke Maternal Grandmother          Current Outpatient Medications:     amLODIPine (NORVASC) 10 MG tablet, Take 1 tablet (10 mg total) by mouth once daily., Disp: 90 tablet, Rfl: 1    atorvastatin (LIPITOR) 40 MG tablet, Take 1 tablet (40 mg total) by mouth once daily., Disp: 90 tablet, Rfl: 1    blood sugar diagnostic Strp, One touch verio flex Use one test strip to check blood glucose BID, Disp: 180 strip, Rfl: 3    blood-glucose meter (ONETOUCH VERIO FLEX START) kit, Use as directed to check blood glucose, Disp: 1 each,  "Rfl: 0    lancets (LANCETS,THIN) Misc, Use one lancet to check blood glucose BID, Disp: 200 each, Rfl: 3    losartan (COZAAR) 25 MG tablet, Take 1 tablet (25 mg total) by mouth once daily., Disp: 90 tablet, Rfl: 1    metFORMIN (GLUCOPHAGE) 500 MG tablet, Take 1 tablet (500 mg total) by mouth 2 (two) times daily., Disp: 180 tablet, Rfl: 1    ONETOUCH DELICA PLUS LANCET 33 gauge Misc, Apply topically., Disp: , Rfl:     gabapentin (NEURONTIN) 100 MG capsule, Take one capsule at bedtime for 7 nights then increase to one capsule BID, Disp: 60 capsule, Rfl: 2    Review of Systems   Constitutional:  Negative for appetite change, chills, fatigue, fever and unexpected weight change.   Respiratory:  Negative for cough and shortness of breath.    Cardiovascular:  Negative for chest pain, palpitations and leg swelling.   Gastrointestinal:  Negative for abdominal pain, change in bowel habit, constipation, diarrhea, nausea and vomiting.   Genitourinary:  Negative for dysuria and frequency.   Musculoskeletal:  Positive for arthralgias and neck pain. Negative for gait problem and myalgias.   Neurological:  Negative for dizziness, syncope, light-headedness and headaches.   Psychiatric/Behavioral:  Negative for dysphoric mood and sleep disturbance. The patient is not nervous/anxious.        Objective     BP (!) 177/92   Pulse 93   Resp 14   Ht 5' 2" (1.575 m)   Wt 74.8 kg (164 lb 14.4 oz)   BMI 30.16 kg/m²     Physical Exam   Constitutional: She is oriented to person, place, and time. No distress.   HENT:   Head: Atraumatic.   Mouth/Throat: Mucous membranes are moist.   Cardiovascular: Normal rate and regular rhythm. Pulmonary:      Effort: Pulmonary effort is normal. No respiratory distress.      Breath sounds: Normal breath sounds. No wheezing, rhonchi or rales.     Abdominal: Soft. Bowel sounds are normal. She exhibits no distension. There is no abdominal tenderness.   Musculoskeletal:      Right shoulder: Tenderness and bony " tenderness present. Decreased range of motion.      Right wrist: Normal.      Left wrist: Normal.      Right hand: Normal.      Left hand: Normal.      Cervical back: Neck supple. Tenderness and bony tenderness present.      Right lower leg: No edema.      Left lower leg: No edema.   Neurological: She is alert and oriented to person, place, and time. Gait normal.   Skin: Skin is warm and dry.   Psychiatric: Her behavior is normal. Mood normal.     Narrative & Impression  EXAMINATION:  XR CERVICAL SPINE AP LATERAL     CLINICAL HISTORY:  .  Cervicalgia     COMPARISON:  No previous similar     TECHNIQUE:  Cervical spine AP and lateral three views     FINDINGS:  There is no fracture or prevertebral soft tissue swelling.  There is moderate anterior spondylosis at C4-C5 and C5-C6 with mild anterior spondylosis elsewhere.  There is mild degenerative disc narrowing at C5-C6 and C6-C7.  Moderate facet arthropathy.     Impression:     Degenerative disc disease cervical spine        Electronically signed by: Walter Perales  Date:                                            11/14/2023  Time:                                           11:08           Exam Ended: 11/14/23 10:14 CST           EXAMINATION:  XR SHOULDER COMPLETE 2 OR MORE VIEWS RIGHT     CLINICAL HISTORY:  Pain in right shoulder     TECHNIQUE:  XR SHOULDER COMPLETE 2 OR MORE VIEWS RIGHT     COMPARISON:  None     FINDINGS:  No acute fracture or dislocation.     Moderate degenerative change     No radiopaque foreign bodies.     Impression:     No acute findings     Moderate degenerative change        Electronically signed by: Rodger Flowers  Date:                                            11/14/2023  Time:                                           10:19           Exam Ended: 11/14/23 10:15 CST           Lab Results   Component Value Date    WBC 6.60 06/21/2023    HGB 11.7 (L) 06/21/2023    HCT 36.9 (L) 06/21/2023    MCV 88.3 06/21/2023     06/21/2023        CMP  Sodium   Date Value Ref Range Status   06/21/2023 140 136 - 145 mmol/L Final     Potassium   Date Value Ref Range Status   06/21/2023 3.7 3.5 - 5.1 mmol/L Final     Chloride   Date Value Ref Range Status   06/21/2023 111 (H) 98 - 107 mmol/L Final     CO2   Date Value Ref Range Status   06/21/2023 21 21 - 32 mmol/L Final     Glucose   Date Value Ref Range Status   06/21/2023 87 74 - 106 mg/dL Final     BUN   Date Value Ref Range Status   06/21/2023 7 7 - 18 mg/dL Final     Creatinine   Date Value Ref Range Status   06/21/2023 0.76 0.55 - 1.02 mg/dL Final     Calcium   Date Value Ref Range Status   06/21/2023 9.7 8.5 - 10.1 mg/dL Final     Total Protein   Date Value Ref Range Status   06/21/2023 8.0 6.4 - 8.2 g/dL Final     Albumin   Date Value Ref Range Status   06/21/2023 4.1 3.5 - 5.0 g/dL Final     Bilirubin, Total   Date Value Ref Range Status   06/21/2023 0.2 >0.0 - 1.2 mg/dL Final     Alk Phos   Date Value Ref Range Status   06/21/2023 77 55 - 142 U/L Final     AST   Date Value Ref Range Status   06/21/2023 15 15 - 37 U/L Final     ALT   Date Value Ref Range Status   06/21/2023 27 13 - 56 U/L Final     Anion Gap   Date Value Ref Range Status   06/21/2023 12 7 - 16 mmol/L Final     eGFR   Date Value Ref Range Status   04/05/2022 80 >=60 mL/min/1.73m² Final     Lab Results   Component Value Date    TSH 1.510 06/21/2023     Lab Results   Component Value Date    CHOL 206 (H) 06/21/2023    CHOL 139 04/05/2022    CHOL 232 (H) 11/04/2021     Lab Results   Component Value Date    HDL 58 06/21/2023    HDL 70 (H) 04/05/2022    HDL 67 (H) 11/04/2021     Lab Results   Component Value Date    LDLCALC 103 06/21/2023    LDLCALC 44 04/05/2022    LDLCALC 128 11/04/2021     Lab Results   Component Value Date    TRIG 224 (H) 06/21/2023    TRIG 126 04/05/2022    TRIG 186 (H) 11/04/2021     Lab Results   Component Value Date    CHOLHDL 3.6 06/21/2023    CHOLHDL 2.0 04/05/2022    CHOLHDL 3.5 11/04/2021     Lab Results    Component Value Date    HGBA1C 6.4 11/14/2023         Assessment and Plan   Cervicalgia  -     CBC Auto Differential; Future; Expected date: 01/08/2024  -     Comprehensive Metabolic Panel; Future; Expected date: 01/08/2024  -     Sedimentation Rate; Future; Expected date: 01/08/2024  -     RUBY EIA w/ Reflex to dsDNA/DINORA; Future; Expected date: 01/08/2024  -     Rheumatoid Factor Screen; Future; Expected date: 01/08/2024  -     Ambulatory referral/consult to [facility]; Future; Expected date: 01/15/2024    Right shoulder pain, unspecified chronicity  -     Sedimentation Rate; Future; Expected date: 01/08/2024  -     RUBY EIA w/ Reflex to dsDNA/DINORA; Future; Expected date: 01/08/2024  -     Rheumatoid Factor Screen; Future; Expected date: 01/08/2024    Cervical radiculopathy  -     gabapentin (NEURONTIN) 100 MG capsule; Take one capsule at bedtime for 7 nights then increase to one capsule BID  Dispense: 60 capsule; Refill: 2    Cervical spondylosis  -     Ambulatory referral/consult to [facility]; Future; Expected date: 01/15/2024    Arthropathy of cervical facet joint    Bilateral arm pain    Type 2 diabetes mellitus without complication, without long-term current use of insulin    Essential hypertension  -     CBC Auto Differential; Future; Expected date: 01/08/2024  -     Comprehensive Metabolic Panel; Future; Expected date: 01/08/2024          Patient Instructions  Patient Instructions   Start gabapentin 100 mg at bedtime for one week then increase to 100 mg two times daily    Referral to Dr BEN Martinez at the Spine clinic, if you have not heard from anyone in two weeks about appointment information, contact referral department at [phone]    Lab obtained in clinic today, we will notify you of results and any necessary changes to plan of care     Follow up in clinic in one month and as needed

## 2024-01-08 NOTE — PATIENT INSTRUCTIONS
Start gabapentin 100 mg at bedtime for one week then increase to 100 mg two times daily    Referral to Dr BEN Martinez at the Spine clinic, if you have not heard from anyone in two weeks about appointment information, contact referral department at [phone]    Lab obtained in clinic today, we will notify you of results and any necessary changes to plan of care     Follow up in clinic in one month and as needed

## 2024-01-09 ENCOUNTER — CLINICAL SUPPORT (OUTPATIENT)
Dept: REHABILITATION | Facility: HOSPITAL | Age: 69
End: 2024-01-09
Payer: COMMERCIAL

## 2024-01-09 DIAGNOSIS — Z71.89 COMPLEX CARE COORDINATION: ICD-10-CM

## 2024-01-09 DIAGNOSIS — M62.81 MUSCLE WEAKNESS OF RIGHT UPPER EXTREMITY: ICD-10-CM

## 2024-01-09 DIAGNOSIS — M19.011 OSTEOARTHRITIS OF RIGHT SHOULDER, UNSPECIFIED OSTEOARTHRITIS TYPE: ICD-10-CM

## 2024-01-09 DIAGNOSIS — M53.82 DECREASED RANGE OF MOTION OF INTERVERTEBRAL DISCS OF CERVICAL SPINE: ICD-10-CM

## 2024-01-09 DIAGNOSIS — M47.812 ARTHROPATHY OF CERVICAL FACET JOINT: Primary | ICD-10-CM

## 2024-01-09 DIAGNOSIS — M25.611 DECREASED RIGHT SHOULDER RANGE OF MOTION: ICD-10-CM

## 2024-01-09 LAB
ALBUMIN SERPL BCP-MCNC: 3.4 G/DL (ref 3.5–5)
ALBUMIN/GLOB SERPL: 0.7 {RATIO}
ALP SERPL-CCNC: 83 U/L (ref 55–142)
ALT SERPL W P-5'-P-CCNC: 16 U/L (ref 13–56)
ANION GAP SERPL CALCULATED.3IONS-SCNC: 13 MMOL/L (ref 7–16)
AST SERPL W P-5'-P-CCNC: 10 U/L (ref 15–37)
BASOPHILS # BLD AUTO: 0.07 K/UL (ref 0–0.2)
BASOPHILS NFR BLD AUTO: 1.1 % (ref 0–1)
BILIRUB SERPL-MCNC: 0.2 MG/DL (ref ?–1.2)
BUN SERPL-MCNC: 7 MG/DL (ref 7–18)
BUN/CREAT SERPL: 10 (ref 6–20)
CALCIUM SERPL-MCNC: 9.7 MG/DL (ref 8.5–10.1)
CHLORIDE SERPL-SCNC: 111 MMOL/L (ref 98–107)
CO2 SERPL-SCNC: 22 MMOL/L (ref 21–32)
CREAT SERPL-MCNC: 0.69 MG/DL (ref 0.55–1.02)
DIFFERENTIAL METHOD BLD: ABNORMAL
EGFR (NO RACE VARIABLE) (RUSH/TITUS): 93 ML/MIN/1.73M2
EOSINOPHIL # BLD AUTO: 0.06 K/UL (ref 0–0.5)
EOSINOPHIL NFR BLD AUTO: 0.9 % (ref 1–4)
ERYTHROCYTE [DISTWIDTH] IN BLOOD BY AUTOMATED COUNT: 12.8 % (ref 11.5–14.5)
ERYTHROCYTE [SEDIMENTATION RATE] IN BLOOD BY WESTERGREN METHOD: 52 MM/HR (ref 0–30)
GLOBULIN SER-MCNC: 4.6 G/DL (ref 2–4)
GLUCOSE SERPL-MCNC: 95 MG/DL (ref 74–106)
HCT VFR BLD AUTO: 34.3 % (ref 38–47)
HGB BLD-MCNC: 11.1 G/DL (ref 12–16)
IMM GRANULOCYTES # BLD AUTO: 0.03 K/UL (ref 0–0.04)
IMM GRANULOCYTES NFR BLD: 0.5 % (ref 0–0.4)
LYMPHOCYTES # BLD AUTO: 2.25 K/UL (ref 1–4.8)
LYMPHOCYTES NFR BLD AUTO: 34.2 % (ref 27–41)
MCH RBC QN AUTO: 27.9 PG (ref 27–31)
MCHC RBC AUTO-ENTMCNC: 32.4 G/DL (ref 32–36)
MCV RBC AUTO: 86.2 FL (ref 80–96)
MONOCYTES # BLD AUTO: 0.62 K/UL (ref 0–0.8)
MONOCYTES NFR BLD AUTO: 9.4 % (ref 2–6)
MPC BLD CALC-MCNC: 9.8 FL (ref 9.4–12.4)
NEUTROPHILS # BLD AUTO: 3.55 K/UL (ref 1.8–7.7)
NEUTROPHILS NFR BLD AUTO: 53.9 % (ref 53–65)
NRBC # BLD AUTO: 0 X10E3/UL
NRBC, AUTO (.00): 0 %
PLATELET # BLD AUTO: 496 K/UL (ref 150–400)
POTASSIUM SERPL-SCNC: 3.5 MMOL/L (ref 3.5–5.1)
PROT SERPL-MCNC: 8 G/DL (ref 6.4–8.2)
RBC # BLD AUTO: 3.98 M/UL (ref 4.2–5.4)
RHEUMATOID FACT SER NEPH-ACNC: NEGATIVE [IU]/ML
SODIUM SERPL-SCNC: 142 MMOL/L (ref 136–145)
WBC # BLD AUTO: 6.58 K/UL (ref 4.5–11)

## 2024-01-09 PROCEDURE — 97112 NEUROMUSCULAR REEDUCATION: CPT

## 2024-01-09 PROCEDURE — 97010 HOT OR COLD PACKS THERAPY: CPT

## 2024-01-09 PROCEDURE — 97140 MANUAL THERAPY 1/> REGIONS: CPT

## 2024-01-09 PROCEDURE — 97110 THERAPEUTIC EXERCISES: CPT

## 2024-01-09 NOTE — PLAN OF CARE
[facility]  Physical Therapy Updated Plan of Care    Name: Dari Valverde  Clinic Number: [id number]    Therapy Diagnosis:   Encounter Diagnoses   Name Primary?    DDD (degenerative disc disease), cervical Yes    Osteoarthritis of right shoulder, unspecified osteoarthritis type      Decreased right shoulder range of motion      Decreased range of motion of intervertebral discs of cervical spine      Muscle weakness of right upper extremity      Physician: Shanthi Cuellar FNP    Visit Date: 1/9/2024    Physician Orders: PT Eval and Treat  Medical Diagnosis from Referral: M19.011 (ICD-10-CM) - Osteoarthritis of right shoulder, unspecified osteoarthritis type and M50.30 (ICD-10-CM) - DDD (degenerative disc disease), cervical  Evaluation Date: 11/29/2023  Authorization Period Expiration: 2/2/2024   Plan of Care Expiration: 1/12/2024  Visit # / Visits authorized: 12/15 (including initial evaluation; 14 [facility] [employer]  PTA Visit #: 0    Time In: 0803  Time Out: 0855   Total Billable Time: 52 minutes    Precautions: Standard and Diabetes  Functional Level Prior to Evaluation: independent with all self-care and ADLs    Subjective     Pt reports: She saw CORINE Ledezma on 1/8/2024 who is referring patient to Dr. Bruce Martinez for further evaluation of the cervical spine.    She was compliant with home exercise program.    Pain: 5/10; 10/10  Location: bilateral shoulders; right hand/wrist, respectively    Objective     Nancy received therapeutic exercises to develop strength, endurance, ROM, flexibility, and posture for 12 minutes including:    UBE forwards/backwards: x 5 minutes  Corner stretch: 3 x 20 second holds  Supine cane flexion: x 20 reps (verbal cues to increase speed)    Counter slides (right) for shoulder flexion: x 20 reps with 5 second holds (not performed)  Table slides (right) for shoulder abduction: x 20 reps with 5 second holds (not performed)  Standing cane  flexion on corner: x 15 reps (not performed)  Pulleys (flexion): x 5 minutes (not performed)  Green web squeezes: x 20 reps (not performed)    Nancy received the following manual therapy techniques for 15 minutes, including:     Bilateral cervical rotation stretch  Bilateral upper trapezius stretch  Myofascial release to the cervical musculature bilaterally, especially the left upper trapezius     Manual traction of the cervical spine: x 10 reps with 10 second holds (not performed)   Myofascial release of the right shoulder and right upper extremity musculature to decrease pain and muscle guarding (not performed)   Grade 1-3 joint mobilizations of the right glenohumeral joint and radiocarpal to improve range of motion and joint lubrication (not performed)   Passive stretching of the right shoulder in all directions (not performed)   Passive stretching and joint mobilizations of the right 4th and 5th fingers to improve flexion (not performed)     Nancy participated in neuromuscular re-education activities to improve: Coordination and Posture for 15 minutes. The following activities were included:    Seated scapular retractions: x 20 reps with 3 second holds  Chin tucks in supine: x 20 reps with 3 second holds  Standing postural holds on wall: x 10 reps with 5 second holds    Supine scapular retraction isometrics: x 20 reps (not performed)   Standing scapular retractions with upper extremity extension: red theraband; x 20 reps (not performed)   Standing rows: red theraband; x 20 reps (not performed)   Supine right shoulder flexion active range of motion (guided by Physical Therapist): x 15 reps (not performed)   Right scapular proprioceptive neuromuscular facilitation in left sidelying: 2 x 10 reps (not performed)     Nancy received the following supervised modalities after being cleared for contradictions: Premodulated IFC Electrical Stimulation for pain control applied to the right shoulder. Pt received stimulation at  a frequency of  Hz and an intensity of 10 mA for 0 minutes. Dari tolderated treatment well without any adverse effects.    Nancy received hot pack for 10 minutes to bilateral upper trapezius muscles.    Nancy received paraffin wax to the right hand/wrist x 0 minutes to decrease pain/stiffness.     Right Shoulder AROM   Flexion 72 degrees   Abduction 70 degrees   External Rotation at 0 Degrees Abduction 70 degrees   Functional Internal Rotation Sacrum     Home Exercises Provided and Patient Education Provided     Education provided: Patient educated on the importance of completing skilled physical therapy treatment and home exercise program as prescribed to maximize functional gains.     Written Home Exercises Provided: Patient instructed to cont prior HEP. Exercises were reviewed and Nancy was able to demonstrate them prior to the end of the session. Nancy demonstrated good understanding of the education provided.     See EMR under Patient Instructions for exercises provided prior visit.    Assessment     Patient with good effort throughout treatment. Patient with significant tenderness on palpation of the left upper trapezius with a trigger point noted. Patient with more restricted right cervical rotation than left. Patient with 3 physical therapy treatment sessions remaining after which Physical Therapist will hold on treatment until patient is evaluated by Dr. Bruce Martinez. Physical Therapist will continue to progress therapeutic exercise, neuromuscular re-education, therapeutic activities, and gait training as able with manual therapy and modalities utilized as needed.    Nancy is progressing well towards her goals.   Pt prognosis is Good.     Pt will continue to benefit from skilled outpatient physical therapy to address the deficits listed in the problem list box on initial evaluation, provide pt/family education, and to maximize pt's level of independence in the home and community environment.     Pt's  spiritual, cultural, and educational needs considered and pt agreeable to plan of care and goals.     Anticipated barriers to physical therapy: compliance with home exercise program; pain tolerance     Goals:    Short Term Goals:  Patient will demonstrate independence with home exercise program to ensure carryover of treatment. [Met; will continue to progress as indicated]  Patient will demonstrate 35 degrees of cervical flexion/extension active range of motion to improve functional use of the cervical spine. [Met]  Patient will demonstrate 85 degrees of right shoulder flexion/abduction active range of motion to improve functional use of the right upper extremity. [Not met: 52 degrees flexion; 63 degrees abduction]  Patient will demonstrate 55 degrees of right shoulder external rotation at 0 degrees abduction active range of motion to improve functional use of the right upper extremity. [Met; 60 degrees]  Patient will demonstrate functional right shoulder internal rotation to L4 to improve functional use of the right upper extremity. [Not met: sacrum]  Patient will improve right upper extremity strength to 3/5 to improve functional use of the right upper extremity. [Not met]  Patient will report a reduction in left cervical and right shoulder pain from 8/10 to 6/10 at worst to improve quality of life. [Not met]   Patient will improve right  strength to 15 lbs (average of 3 trials) on the  strength dynamometer to improve functional use of the right hand. [Not met: undetectable on dynamometer]     Long Term Goals:  Patient will demonstrate 120 degrees of right shoulder flexion/abduction active range of motion to improve functional use of the right upper extremity.   Patient will demonstrate 65 degrees of right shoulder external rotation at 0 degrees abduction active range of motion to improve functional use of the right upper extremity.  Patient will demonstrate functional right shoulder internal rotation to  T8 to improve functional use of the right upper extremity.  Patient will improve right upper extremity strength to 4-/5 to improve functional use of the right upper extremity  Patient will report a reduction in left cervical and right shoulder pain from 8/10 to 4/10 at worst to improve quality of life.   Patient will improve right  strength to 30 lbs (average of 3 trials) on the  strength dynamometer to improve functional use of the right hand.    Reasons for Recertification of Therapy: Patient has not met all goals.     Plan     Updated Certification Period: 1/9/2024 to 1/19/2023  Recommended Treatment Plan: 2 times per week for 2 weeks (3 visits): Cervical/Lumbar Traction, Electrical Stimulation (IFC/premodulated IFC), Manual Therapy, Moist Heat/ Ice, Neuromuscular Re-ed, Patient Education, Therapeutic Activities, Therapeutic Exercise, and Ultrasound  Other Recommendations: N/A      Payam Leung, PT, DPT  1/9/2024

## 2024-01-11 LAB — ANA SER QL: NEGATIVE

## 2024-01-17 ENCOUNTER — HOSPITAL ENCOUNTER (OUTPATIENT)
Dept: RADIOLOGY | Facility: HOSPITAL | Age: 69
Discharge: HOME | End: 2024-01-17
Attending: ORTHOPAEDIC SURGERY
Payer: COMMERCIAL

## 2024-01-17 ENCOUNTER — OFFICE VISIT (OUTPATIENT)
Dept: SPINE | Facility: CLINIC | Age: 69
End: 2024-01-17
Payer: COMMERCIAL

## 2024-01-17 DIAGNOSIS — M54.2 CERVICALGIA: ICD-10-CM

## 2024-01-17 DIAGNOSIS — M47.812 CERVICAL SPONDYLOSIS: ICD-10-CM

## 2024-01-17 DIAGNOSIS — M54.12 CERVICAL RADICULOPATHY: ICD-10-CM

## 2024-01-17 DIAGNOSIS — M54.12 CERVICAL RADICULOPATHY: Primary | ICD-10-CM

## 2024-01-17 PROCEDURE — 1159F MED LIST DOCD IN RCRD: CPT | Mod: CPTII,,, | Performed by: ORTHOPAEDIC SURGERY

## 2024-01-17 PROCEDURE — 72050 X-RAY EXAM NECK SPINE 4/5VWS: CPT | Mod: TC

## 2024-01-17 PROCEDURE — 99204 OFFICE O/P NEW MOD 45 MIN: CPT | Mod: S$PBB,,, | Performed by: ORTHOPAEDIC SURGERY

## 2024-01-17 PROCEDURE — 72050 X-RAY EXAM NECK SPINE 4/5VWS: CPT | Mod: 26,,, | Performed by: ORTHOPAEDIC SURGERY

## 2024-01-17 PROCEDURE — 99213 OFFICE O/P EST LOW 20 MIN: CPT | Mod: PBBFAC | Performed by: ORTHOPAEDIC SURGERY

## 2024-01-17 RX ORDER — GABAPENTIN 300 MG/1
300 CAPSULE ORAL 3 TIMES DAILY
Qty: 90 CAPSULE | Refills: 11 | Status: SHIPPED | OUTPATIENT
Start: 2024-01-17 | End: 2025-01-16

## 2024-01-17 NOTE — PROGRESS NOTES
MDM/time:  Greater than 45 minutes spent on this encounter including 15 minutes reviewing imaging and notes, 20 minutes with the patient, 10 minutes documentation    ASSESSMENT:  68 y.o. female with cervical spondylolithesis C5-6 and C6-7 with weakness in bilateral upper extremities     PLAN:  MRI cervical spine   Increase neurontin 300mg 1 tab TID   Follow up after MRI     HPI:  68 y.o. female here for evaluation of neck pain that radiates into bilateral shoulders and arms that has been ongoing for the past 3 months.  Patient reports she woke up with a Crick in her neck few days later started having right shoulder pain then pain started radiating into the right arm she was seen by her primary care provider sent to physical therapy and now she is having pain into the left shoulder and left hand.  Reports decreased range of motion in bilateral arms.  Difficulty with  strength.  No issues with balance or falls.  Denies bladder bowel incontinence.  No difficulty walking distances.  Currently taking Tylenol and Neurontin 100 mg twice a day.  She has had 11 sessions of physical therapy on her shoulder and neck with little relief of pain.  No prior pain management.  No recent MRI.  No prior spine surgery.  Patient is not a smoker.    IMAGING:  AP, lateral, flexion/extension views of the cervical spine reviewed     On the AP there is normal coronal alignment.  There is uncovertebral and facet hypertrophy seen.  On the lateral there is loss of cervical lordosis.  There are spondylotic changes noted with decrease in disc height and osteophyte formation seen.  Grade 1 anterolisthesis at C5-6 and C6-7 with flexion.     Impression:  Degenerative changes of cervical spine as noted above    Past Medical History:   Diagnosis Date    Diabetes     Hyperlipemia     Hypertension      Past Surgical History:   Procedure Laterality Date     SECTION      CHOLECYSTECTOMY      TUBAL LIGATION       Social History      Tobacco Use    Smoking status: Never     Passive exposure: Never    Smokeless tobacco: Never   Substance Use Topics    Alcohol use: Never    Drug use: Never      Current Outpatient Medications   Medication Instructions    amLODIPine (NORVASC) 10 mg, Oral, Daily    atorvastatin (LIPITOR) 40 mg, Oral, Daily    blood sugar diagnostic Strp One touch verio flex Use one test strip to check blood glucose BID    blood-glucose meter (ONETOUCH VERIO FLEX START) kit Use as directed to check blood glucose    gabapentin (NEURONTIN) 100 MG capsule Take one capsule at bedtime for 7 nights then increase to one capsule BID    lancets (LANCETS,THIN) Misc Use one lancet to check blood glucose BID    losartan (COZAAR) 25 mg, Oral, Daily    metFORMIN (GLUCOPHAGE) 500 mg, Oral, 2 times daily    ONETOUCH DELICA PLUS LANCET 33 gauge Misc Topical (Top)        EXAM:  Constitutional  General Appearance:  There is no height or weight on file to calculate BMI., NAD  Psychiatric   Orientation: Oriented to time, oriented to place, oriented to person  Mood and Affect: Active and alert, normal mood, normal affect  Gait and Station   Appearance:  Normal gait, normal tandem gait, able to walk on toes, able to walk on heels    CERVICAL  Musculoskeletal System  Shoulder:  normal appearance, no instability, no tenderness, decreased ROM right, decreased ROM left, Pain with ROM    Cervical Spine  Inspection:  alignment normal, no muscle atrophy  Soft Tissue Palpation on the Right:  no tenderness of the paracervicals, no tenderness of the trapezius, no tenderness of the rhomboid  Soft Tissue Palpation on the Left:  no tenderness of the paracervicals, no tenderness of the trapezius, no tenderness of the rhomboid  Bony Palpation:  no tenderness of the occipital protuberance  Active Range:     Flexion decreased, Extension normal, Rotation to the left normal, Rotation to the right decreased, Lateral flexion to the left decreased, Lateral flexion to the  right normal   Pain elicited on motion    Motor Strength  C5 on the right:  abduction deltoid 3/5  C5 on the left:  abduction deltoid 3/5  C6 on the Right:  flexion biceps 3/5, wrist extension 3/5  C6 on the Left:  flexion biceps 3/5, wrist extension 3/5  C7 on the Right:  extension fingers 3/5, extension triceps 3/5  C7 on the Left:  extension fingers 3/5, extension triceps 3/5  C8 on the Right:  flexion fingers 3/5  C8 on the Left:  flexion fingers 2/5  T1 on the Right:  abduction fingers 3/5  T1 on the Left:  abduction fingers 2/5    Neurological System  Sensation on the Right:  normal sensation of the extremities: right, normal median nerve distribution, normal ulnar nerve distribution  Sensation on the Left:  normal sensation of the extremities: left, normal median nerve distribution, normal ulnar nerve distribution  Biceps Reflex Right:  normal, Brachioradialis Reflex Right:  normal, Triceps Reflex Right: normal  Biceps Reflex Left:  normal, Brachioradialis Reflex Left: normal, Triceps Reflex Left:  normal  Special Test on the Right:  Spurlings test negative, Hoffmans reflex absent, no ankle clonus, Durkan test negative, Tinels sign negative at the elbow  Special Test on the Left:  Spurlings test negative, Hoffmans reflex absent, no ankle clonus, Durkan test negative, Tinels sign negative at the elbow    Skin:   Head and Neck:  normal   Right Upper Extremity:  normal   Left Upper Extremity:  normal    Cardiovascular:   Arterial Pulses Right:  radial right   Arterial Pulses Left:  radial left   Edema Right:  none   Edema Left:  none

## 2024-01-17 NOTE — PATIENT INSTRUCTIONS
Your MRI is scheduled for 1/29 @ 1 PM at [facility].   The address is : [address] [place] [address]  The phone number is [phone]

## 2024-01-17 NOTE — PROGRESS NOTES
AP, lateral, flexion/extension views of the cervical spine reviewed    On the AP there is normal coronal alignment.  There is uncovertebral and facet hypertrophy seen.  On the lateral there is loss of cervical lordosis.  There are spondylotic changes noted with decrease in disc height and osteophyte formation seen.  Grade 1 anterolisthesis at C5-6 and C6-7 with flexion.    Impression:  Degenerative changes of cervical spine as noted above

## 2024-01-18 ENCOUNTER — CLINICAL SUPPORT (OUTPATIENT)
Dept: REHABILITATION | Facility: HOSPITAL | Age: 69
End: 2024-01-18
Payer: COMMERCIAL

## 2024-01-18 DIAGNOSIS — M19.011 OSTEOARTHRITIS OF RIGHT SHOULDER, UNSPECIFIED OSTEOARTHRITIS TYPE: ICD-10-CM

## 2024-01-18 DIAGNOSIS — M62.81 MUSCLE WEAKNESS OF RIGHT UPPER EXTREMITY: ICD-10-CM

## 2024-01-18 DIAGNOSIS — M47.812 ARTHROPATHY OF CERVICAL FACET JOINT: Primary | ICD-10-CM

## 2024-01-18 DIAGNOSIS — M53.82 DECREASED RANGE OF MOTION OF INTERVERTEBRAL DISCS OF CERVICAL SPINE: ICD-10-CM

## 2024-01-18 DIAGNOSIS — M25.611 DECREASED RIGHT SHOULDER RANGE OF MOTION: ICD-10-CM

## 2024-01-18 PROCEDURE — 97112 NEUROMUSCULAR REEDUCATION: CPT | Mod: CQ

## 2024-01-18 PROCEDURE — 97140 MANUAL THERAPY 1/> REGIONS: CPT | Mod: CQ

## 2024-01-18 PROCEDURE — 97110 THERAPEUTIC EXERCISES: CPT | Mod: CQ

## 2024-01-18 NOTE — PLAN OF CARE
[facility]  Physical Therapy Discharge Summary    Name: Dari Valverde  Clinic Number: [id number]    Therapy Diagnosis:   Encounter Diagnoses   Name Primary?    Arthropathy of cervical facet joint Yes    Osteoarthritis of right shoulder, unspecified osteoarthritis type     Decreased right shoulder range of motion     Decreased range of motion of intervertebral discs of cervical spine     Muscle weakness of right upper extremity      Physician: Shanthi Cuellar FNP    Physician Orders: PT Eval and Treat  Medical Diagnosis from Referral: M19.011 (ICD-10-CM) - Osteoarthritis of right shoulder, unspecified osteoarthritis type and M50.30 (ICD-10-CM) - DDD (degenerative disc disease), cervical  Evaluation Date: 11/29/2023    Date of Last visit: 1/18/2024  Total Visits Received: 13    Assessment      Goals:    Short Term Goals:  Patient will demonstrate independence with home exercise program to ensure carryover of treatment. [Met; will continue to progress as indicated]  Patient will demonstrate 35 degrees of cervical flexion/extension active range of motion to improve functional use of the cervical spine. [Met]  Patient will demonstrate 85 degrees of right shoulder flexion/abduction active range of motion to improve functional use of the right upper extremity. [Not met: 52 degrees flexion; 63 degrees abduction]  Patient will demonstrate 55 degrees of right shoulder external rotation at 0 degrees abduction active range of motion to improve functional use of the right upper extremity. [Met; 60 degrees]  Patient will demonstrate functional right shoulder internal rotation to L4 to improve functional use of the right upper extremity. [Not met: sacrum]  Patient will improve right upper extremity strength to 3/5 to improve functional use of the right upper extremity. [Not met]  Patient will report a reduction in left cervical and right shoulder pain from 8/10 to 6/10 at worst to improve  quality of life. [Not met]   Patient will improve right  strength to 15 lbs (average of 3 trials) on the  strength dynamometer to improve functional use of the right hand. [Not met: undetectable on dynamometer]     Long Term Goals:  Patient will demonstrate 120 degrees of right shoulder flexion/abduction active range of motion to improve functional use of the right upper extremity.   Patient will demonstrate 65 degrees of right shoulder external rotation at 0 degrees abduction active range of motion to improve functional use of the right upper extremity.  Patient will demonstrate functional right shoulder internal rotation to T8 to improve functional use of the right upper extremity.  Patient will improve right upper extremity strength to 4-/5 to improve functional use of the right upper extremity  Patient will report a reduction in left cervical and right shoulder pain from 8/10 to 4/10 at worst to improve quality of life.   Patient will improve right  strength to 30 lbs (average of 3 trials) on the  strength dynamometer to improve functional use of the right hand.    Discharge reason: Patient will have an MRI of her cervical spine completed and then follow-up with Dr. Bruce Martinez.     Plan     This patient is discharged from Physical Therapy.    Payam Leung, PT, DPT  1/18/2024

## 2024-01-18 NOTE — PROGRESS NOTES
[facility] OUTPATIENT REHABILITATION  Physical Therapy Treatment Note    Name: Dari Valverde  Clinic Number: [id number]    Therapy Diagnosis:   Encounter Diagnoses   Name Primary?    DDD (degenerative disc disease), cervical Yes    Osteoarthritis of right shoulder, unspecified osteoarthritis type      Decreased right shoulder range of motion      Decreased range of motion of intervertebral discs of cervical spine      Muscle weakness of right upper extremity      Physician: Shanthi Cuellar FNP    Visit Date: 1/18/2024    Physician Orders: PT Eval and Treat  Medical Diagnosis from Referral: M19.011 (ICD-10-CM) - Osteoarthritis of right shoulder, unspecified osteoarthritis type and M50.30 (ICD-10-CM) - DDD (degenerative disc disease), cervical  Evaluation Date: 11/29/2023  Authorization Period Expiration: 2/2/2024   Plan of Care Expiration: 1/19/2024  Visit # / Visits authorized: 13/15 (including initial evaluation; 14 authorized by [facility])  PTA Visit #: 2    Time In: 0800  Time Out: 0844   Total Billable Time: 44 minutes    Precautions: Standard and Diabetes  Functional Level Prior to Evaluation: independent with all self-care and ADLs    Subjective     Pt reports: her left shoulder/hand/wrist are hurting her more than her right side now; patient states her pain is a 10/10 this morning, especially in her hand/wrist with edema present    She was compliant with home exercise program.    Pain: 3/10; 5/10; 10/10 (left shoulder/hand/wrist)  Location: right shoulder; right hand/wrist, respectively; left shoulder; left hand/wrist    Objective     Nancy received therapeutic exercises to develop strength, endurance, ROM, flexibility, and posture for 10 minutes including:    UBE forwards/backwards: x 5 minutes  Supine cane flexion: x 20 reps (verbal cues to increase speed) (Patient only able to complete 5 reps due to increased pain in bilateral shoulders)  Standing cane flexion on corner: x 15 reps (only able to  complete 5 reps due to pain in left hand/wrist and difficulty with bilateral shoulders)    Pulleys (flexion): x 5 minutes (not performed)   Green web squeezes: x 20 reps (not performed)   Counter slides (right) for shoulder flexion: x 20 reps with 5 second holds (not performed)  Table slides (right) for shoulder abduction: x 20 reps with 5 second holds (not performed)    Nancy received the following manual therapy techniques for 15 minutes, including:     Myofascial release to cervical musculature to decrease pain and improve function  Manual traction of the cervical spine: x 10 reps with 10 second holds  Passive range of motion with stretch at end range into bilateral cervical rotation and lateral bending    Myofascial release of the right shoulder and right upper extremity musculature to decrease pain and muscle guarding (not performed)  Grade 1-3 joint mobilizations of the right glenohumeral joint and radiocarpal to improve range of motion and joint lubrication (not performed)  Passive stretching of the right shoulder in all directions (not performed)  Passive stretching and joint mobilizations of the right 4th and 5th fingers to improve flexion (not performed)     Nancy participated in neuromuscular re-education activities to improve: Coordination and Posture for 11 minutes. The following activities were included:    Supine scapular retraction isometrics: x 20 reps  Supine chin tucks: x 20 reps  Standing scapular retractions with upper extremity extension: red theraband; x 20 reps  Standing rows: red theraband; x 20 reps    Supine right shoulder flexion active range of motion (guided by Physical Therapist): x 15 reps (not performed)   Right scapular proprioceptive neuromuscular facilitation in left sidelying: 2 x 10 reps (not performed)     Nancy received the following supervised modalities after being cleared for contradictions: Premodulated IFC Electrical Stimulation for pain control applied to the right  shoulder. Pt received stimulation at a frequency of  Hz and an intensity of 10 mA for 0 minutes. Dari tolderated treatment well without any adverse effects.    Nancy received hot pack for 8 minutes to bilateral upper trapezius muscles to decrease pain.    Nancy received paraffin wax to the right hand/wrist x 0 minutes to decrease pain/stiffness.     Right Shoulder AROM   Flexion 72 degrees   Abduction 70 degrees   External Rotation at 0 Degrees Abduction 70 degrees   Functional Internal Rotation Sacrum     Home Exercises Provided and Patient Education Provided     Education provided: Patient educated on the importance of completing skilled physical therapy treatment and home exercise program as prescribed to maximize functional gains.     Written Home Exercises Provided: Patient instructed to cont prior HEP. Exercises were reviewed and Nancy was able to demonstrate them prior to the end of the session. Nancy demonstrated good understanding of the education provided.     See EMR under Patient Instructions for exercises provided prior visit.    Assessment     Patient with good overall effort. Patient able to perform all exercises, but did have difficulty with cane flexion in supine/standing due to increased left wrist and shoulder pain this session. Patient reported decreased pain in her cervical region following manual therapy to the cervical spine. Patient saw Dr Martinez for spinal services yesterday and a MRI is scheduled for 1/29/2024. Patient will discharge and go to her MRI on 1/29/2024 and discuss results with Dr Martinez and hear his recommendations.    Nancy is progressing well towards her goals.   Pt prognosis is Good.     Pt will continue to benefit from skilled outpatient physical therapy to address the deficits listed in the problem list box on initial evaluation, provide pt/family education, and to maximize pt's level of independence in the home and community environment.     Pt's spiritual, cultural,  and educational needs considered and pt agreeable to plan of care and goals.     Anticipated barriers to physical therapy: compliance with home exercise program; pain tolerance     Goals:    Short Term Goals:  Patient will demonstrate independence with home exercise program to ensure carryover of treatment. [Met; will continue to progress as indicated]  Patient will demonstrate 35 degrees of cervical flexion/extension active range of motion to improve functional use of the cervical spine. [Met]  Patient will demonstrate 85 degrees of right shoulder flexion/abduction active range of motion to improve functional use of the right upper extremity. [Not met: 52 degrees flexion; 63 degrees abduction]  Patient will demonstrate 55 degrees of right shoulder external rotation at 0 degrees abduction active range of motion to improve functional use of the right upper extremity. [Met; 60 degrees]  Patient will demonstrate functional right shoulder internal rotation to L4 to improve functional use of the right upper extremity. [Not met: sacrum]  Patient will improve right upper extremity strength to 3/5 to improve functional use of the right upper extremity. [Not met]  Patient will report a reduction in left cervical and right shoulder pain from 8/10 to 6/10 at worst to improve quality of life. [Not met]   Patient will improve right  strength to 15 lbs (average of 3 trials) on the  strength dynamometer to improve functional use of the right hand. [Not met: undetectable on dynamometer]     Long Term Goals:  Patient will demonstrate 120 degrees of right shoulder flexion/abduction active range of motion to improve functional use of the right upper extremity.   Patient will demonstrate 65 degrees of right shoulder external rotation at 0 degrees abduction active range of motion to improve functional use of the right upper extremity.  Patient will demonstrate functional right shoulder internal rotation to T8 to improve  functional use of the right upper extremity.  Patient will improve right upper extremity strength to 4-/5 to improve functional use of the right upper extremity  Patient will report a reduction in left cervical and right shoulder pain from 8/10 to 4/10 at worst to improve quality of life.   Patient will improve right  strength to 30 lbs (average of 3 trials) on the  strength dynamometer to improve functional use of the right hand.    Plan     Patient to discharge      MYRA Hodgson  1/18/2024

## 2024-01-25 ENCOUNTER — HOSPITAL ENCOUNTER (OUTPATIENT)
Dept: RADIOLOGY | Facility: HOSPITAL | Age: 69
Discharge: HOME | End: 2024-01-25
Attending: NURSE PRACTITIONER
Payer: COMMERCIAL

## 2024-01-25 VITALS — HEIGHT: 62 IN | BODY MASS INDEX: 30.18 KG/M2 | WEIGHT: 164 LBS

## 2024-01-25 DIAGNOSIS — Z12.31 BREAST CANCER SCREENING BY MAMMOGRAM: ICD-10-CM

## 2024-01-25 PROCEDURE — 77067 SCR MAMMO BI INCL CAD: CPT | Mod: TC

## 2024-01-29 ENCOUNTER — OFFICE VISIT (OUTPATIENT)
Dept: SPINE | Facility: CLINIC | Age: 69
End: 2024-01-29
Payer: COMMERCIAL

## 2024-01-29 DIAGNOSIS — M25.511 BILATERAL SHOULDER PAIN, UNSPECIFIED CHRONICITY: ICD-10-CM

## 2024-01-29 DIAGNOSIS — M51.36 DDD (DEGENERATIVE DISC DISEASE), LUMBAR: ICD-10-CM

## 2024-01-29 DIAGNOSIS — M54.12 CERVICAL RADICULOPATHY: Primary | ICD-10-CM

## 2024-01-29 DIAGNOSIS — M25.512 BILATERAL SHOULDER PAIN, UNSPECIFIED CHRONICITY: ICD-10-CM

## 2024-01-29 PROCEDURE — 99212 OFFICE O/P EST SF 10 MIN: CPT | Mod: PBBFAC | Performed by: ORTHOPAEDIC SURGERY

## 2024-01-29 PROCEDURE — 99214 OFFICE O/P EST MOD 30 MIN: CPT | Mod: S$PBB,,, | Performed by: ORTHOPAEDIC SURGERY

## 2024-01-29 NOTE — PROGRESS NOTES
MDM/time:  Greater than 30 minutes spent on this encounter including 10 minutes reviewing imaging and notes, 15 minutes with the patient, 5 minutes documentation    ASSESSMENT:  68 y.o. female with cervical spondylolithesis and radiculopathy.  Bilateral rotator cuff syndrome     PLAN:  Pain referral.  Referral to General Orthopedics for bilateral shoulders.  Follow-up 4 months     HPI:  68 y.o. female here for repeat evaluation of neck pain that radiates into bilateral shoulders and arms that has been ongoing for the past 3 months.  Patient reports she woke up with a Crick in her neck few days later started having right shoulder pain then pain started radiating into the right arm she was seen by her primary care provider sent to physical therapy and now she is having pain into the left shoulder and left hand.  Reports decreased range of motion in bilateral arms.  Difficulty with  strength.  No issues with balance or falls.  Denies bladder bowel incontinence.  No difficulty walking distances.  Currently taking Tylenol and Neurontin 100 mg twice a day.  She has had 11 sessions of physical therapy on her shoulder and neck with little relief of pain.  No prior pain management.  No prior spine surgery.  Patient is not a smoker.    IMAGING:  X-ray cervical spine reviewed show:  On the AP there is normal coronal alignment.  There is uncovertebral and facet hypertrophy seen.  On the lateral there is loss of cervical lordosis.  There are spondylotic changes noted with decrease in disc height and osteophyte formation seen.  Grade 1 anterolisthesis at C5-6 and C6-7 with flexion.     MRI cervical spine at [place] 01/29/2024 reviewed shows:  At C3-4 there is mild central stenosis.  Moderate bilateral foraminal stenosis   At C4-5 there is moderate right greater than left foraminal stenosis  At C5-6 and C6-7 there is reduction of spondylolisthesis    Past Medical History:   Diagnosis Date    Diabetes     Hyperlipemia      Hypertension      Past Surgical History:   Procedure Laterality Date     SECTION      CHOLECYSTECTOMY      TUBAL LIGATION  1982     Social History     Tobacco Use    Smoking status: Never     Passive exposure: Never    Smokeless tobacco: Never   Substance Use Topics    Alcohol use: Never    Drug use: Never      Current Outpatient Medications   Medication Instructions    amLODIPine (NORVASC) 10 mg, Oral, Daily    atorvastatin (LIPITOR) 40 mg, Oral, Daily    blood sugar diagnostic Strp One touch verio flex Use one test strip to check blood glucose BID    blood-glucose meter (ONETOUCH VERIO FLEX START) kit Use as directed to check blood glucose    gabapentin (NEURONTIN) 300 mg, Oral, 3 times daily    lancets (LANCETS,THIN) Misc Use one lancet to check blood glucose BID    losartan (COZAAR) 25 mg, Oral, Daily    metFORMIN (GLUCOPHAGE) 500 mg, Oral, 2 times daily    ONETOUCH DELICA PLUS LANCET 33 gauge Misc Topical (Top)        EXAM:  Constitutional  General Appearance:  There is no height or weight on file to calculate BMI., NAD  Psychiatric   Orientation: Oriented to time, oriented to place, oriented to person  Mood and Affect: Active and alert, normal mood, normal affect  Gait and Station   Appearance:  Normal gait, normal tandem gait, able to walk on toes, able to walk on heels    CERVICAL  Musculoskeletal System  Shoulder:  normal appearance, no instability, no tenderness, decreased ROM right, decreased ROM left, Pain with ROM    Cervical Spine  Inspection:  alignment normal, no muscle atrophy  Soft Tissue Palpation on the Right:  no tenderness of the paracervicals, no tenderness of the trapezius, no tenderness of the rhomboid  Soft Tissue Palpation on the Left:  no tenderness of the paracervicals, no tenderness of the trapezius, no tenderness of the rhomboid  Bony Palpation:  no tenderness of the occipital protuberance  Active Range:     Flexion decreased, Extension normal, Rotation to the left normal,  Rotation to the right decreased, Lateral flexion to the left decreased, Lateral flexion to the right normal   Pain elicited on motion    Motor Strength  C5 on the right:  abduction deltoid 3/5  C5 on the left:  abduction deltoid 3/5  C6 on the Right:  flexion biceps 3/5, wrist extension 3/5  C6 on the Left:  flexion biceps 3/5, wrist extension 3/5  C7 on the Right:  extension fingers 3/5, extension triceps 3/5  C7 on the Left:  extension fingers 3/5, extension triceps 3/5  C8 on the Right:  flexion fingers 3/5  C8 on the Left:  flexion fingers 2/5  T1 on the Right:  abduction fingers 3/5  T1 on the Left:  abduction fingers 2/5    Neurological System  Sensation on the Right:  normal sensation of the extremities: right, normal median nerve distribution, normal ulnar nerve distribution  Sensation on the Left:  normal sensation of the extremities: left, normal median nerve distribution, normal ulnar nerve distribution  Biceps Reflex Right:  normal, Brachioradialis Reflex Right:  normal, Triceps Reflex Right: normal  Biceps Reflex Left:  normal, Brachioradialis Reflex Left: normal, Triceps Reflex Left:  normal  Special Test on the Right:  Spurlings test negative, Hoffmans reflex absent, no ankle clonus, Durkan test negative, Tinels sign negative at the elbow  Special Test on the Left:  Spurlings test negative, Hoffmans reflex absent, no ankle clonus, Durkan test negative, Tinels sign negative at the elbow    Skin:   Head and Neck:  normal   Right Upper Extremity:  normal   Left Upper Extremity:  normal    Cardiovascular:   Arterial Pulses Right:  radial right   Arterial Pulses Left:  radial left   Edema Right:  none   Edema Left:  none

## 2024-02-19 DIAGNOSIS — M25.512 BILATERAL SHOULDER PAIN, UNSPECIFIED CHRONICITY: Primary | ICD-10-CM

## 2024-02-19 DIAGNOSIS — M25.511 BILATERAL SHOULDER PAIN, UNSPECIFIED CHRONICITY: Primary | ICD-10-CM

## 2024-02-20 ENCOUNTER — OFFICE VISIT (OUTPATIENT)
Dept: ORTHOPEDICS | Facility: CLINIC | Age: 69
End: 2024-02-20
Payer: COMMERCIAL

## 2024-02-20 ENCOUNTER — HOSPITAL ENCOUNTER (OUTPATIENT)
Dept: RADIOLOGY | Facility: HOSPITAL | Age: 69
Discharge: HOME | End: 2024-02-20
Payer: COMMERCIAL

## 2024-02-20 DIAGNOSIS — M25.512 BILATERAL SHOULDER PAIN, UNSPECIFIED CHRONICITY: ICD-10-CM

## 2024-02-20 DIAGNOSIS — M25.511 BILATERAL SHOULDER PAIN, UNSPECIFIED CHRONICITY: ICD-10-CM

## 2024-02-20 PROCEDURE — 1159F MED LIST DOCD IN RCRD: CPT | Mod: CPTII,,,

## 2024-02-20 PROCEDURE — 99213 OFFICE O/P EST LOW 20 MIN: CPT | Mod: PBBFAC,25

## 2024-02-20 PROCEDURE — 99213 OFFICE O/P EST LOW 20 MIN: CPT | Mod: S$PBB,,,

## 2024-02-20 PROCEDURE — 73030 X-RAY EXAM OF SHOULDER: CPT | Mod: TC,50

## 2024-02-20 PROCEDURE — 73030 X-RAY EXAM OF SHOULDER: CPT | Mod: 26,50,, | Performed by: RADIOLOGY

## 2024-02-20 NOTE — PATIENT INSTRUCTIONS
Bilateral shoulder pain.  Reviewed previous charts from Dr. Martinez.  Discussed with the patient that this weakness is why than likely coming from her neck.  She admits to some shoulder pain but only with activities.  She has reduced range motion that could indicate rotator cuff tendinopathy.  Personally reviewed x-rays today with mild-to-moderate DJD changes in her right shoulder greater than left of both the AC and glenohumeral joints.  Discussed all treatment options with the patient today.  Patient did share that she feels like she is getting better slowly with physical therapy and home exercise program.  Discussed possible steroid injections however she does not want to pursue shots or surgery at this time.  Discussed that she can continue with conservative management.  If she continues to have pain or it worsens over the next month I can see her in the office to reconsider steroid injections.  Follow up p.r.n..

## 2024-02-20 NOTE — PROGRESS NOTES
CC:   Chief Complaint   Patient presents with    Left Shoulder - Pain    Right Shoulder - Pain          Dari Valverde is a 69 y.o. female seen today for Pain of the Left Shoulder and Pain of the Right Shoulder   Patient is complaining of bilateral shoulder and hand weakness.  She states she has been to multiple providers and participated in PT for 6 weeks.  She states she last saw Dr. Martinez who obtained x-rays and MRI showing cervical stenosis. Patient admits to pain in bilateral shoulders with movement and decreased ROM. She states when using pulleys in PT she can reach her arms over her head but cannot do that on her own. She denies any fall or injury.      PAST MEDICAL HISTORY:   Past Medical History:   Diagnosis Date    Diabetes     Hyperlipemia     Hypertension           PAST SURGICAL HISTORY:   Past Surgical History:   Procedure Laterality Date     SECTION      CHOLECYSTECTOMY      TUBAL LIGATION            ALLERGIES:   Review of patient's allergies indicates:   Allergen Reactions    Lisinopril      Causes cough         MEDICATIONS :    Current Outpatient Medications:     amLODIPine (NORVASC) 10 MG tablet, Take 1 tablet (10 mg total) by mouth once daily., Disp: 90 tablet, Rfl: 1    atorvastatin (LIPITOR) 40 MG tablet, Take 1 tablet (40 mg total) by mouth once daily., Disp: 90 tablet, Rfl: 1    blood sugar diagnostic Strp, One touch verio flex Use one test strip to check blood glucose BID, Disp: 180 strip, Rfl: 3    blood-glucose meter (ONETOUCH VERIO FLEX START) kit, Use as directed to check blood glucose, Disp: 1 each, Rfl: 0    gabapentin (NEURONTIN) 300 MG capsule, Take 1 capsule (300 mg total) by mouth 3 (three) times daily., Disp: 90 capsule, Rfl: 11    lancets (LANCETS,THIN) Misc, Use one lancet to check blood glucose BID, Disp: 200 each, Rfl: 3    losartan (COZAAR) 25 MG tablet, Take 1 tablet (25 mg total) by mouth once daily., Disp: 90 tablet, Rfl: 1    metFORMIN  (GLUCOPHAGE) 500 MG tablet, Take 1 tablet (500 mg total) by mouth 2 (two) times daily., Disp: 180 tablet, Rfl: 1    ONETOUCH DELICA PLUS LANCET 33 gauge Misc, Apply topically., Disp: , Rfl:      SOCIAL HISTORY:   Social History     Socioeconomic History    Marital status:    Tobacco Use    Smoking status: Never     Passive exposure: Never    Smokeless tobacco: Never   Substance and Sexual Activity    Alcohol use: Never    Drug use: Never    Sexual activity: Yes     Partners: Male     Birth control/protection: Post-menopausal     Social Determinants of Health     Financial Resource Strain: Low Risk  (11/9/2023)    Overall Financial Resource Strain (CARDIA)     Difficulty of Paying Living Expenses: Not hard at all   Food Insecurity: No Food Insecurity (11/9/2023)    Hunger Vital Sign     Worried About Running Out of Food in the Last Year: Never true     Ran Out of Food in the Last Year: Never true   Transportation Needs: No Transportation Needs (11/9/2023)    PRAPARE - Transportation     Lack of Transportation (Medical): No     Lack of Transportation (Non-Medical): No   Physical Activity: Insufficiently Active (11/9/2023)    Exercise Vital Sign     Days of Exercise per Week: 6 days     Minutes of Exercise per Session: 20 min   Stress: No Stress Concern Present (11/9/2023)    [employer] - Occupational Stress Questionnaire     Feeling of Stress : Not at all   Social Connections: Socially Integrated (11/9/2023)    Social Connection and Isolation Panel [NHANES]     Frequency of Communication with Friends and Family: More than three times a week     Frequency of Social Gatherings with Friends and Family: Once a week     Attends Hindu Services: More than 4 times per year     Active Member of Clubs or Organizations: Yes     Attends Club or Organization Meetings: More than 4 times per year     Marital Status:    Housing Stability: Low Risk  (11/9/2023)    Housing Stability Vital  Sign     Unable to Pay for Housing in the Last Year: No     Number of Places Lived in the Last Year: 1     Unstable Housing in the Last Year: No        FAMILY HISTORY:   Family History   Problem Relation Age of Onset    Dementia Mother     Heart disease Father     Stroke Maternal Grandmother           PHYSICAL EXAM:      There were no vitals filed for this visit.  There is no height or weight on file to calculate BMI.    GENERAL: Well-developed, well-nourished female . The patient is alert, oriented and cooperative.    HEENT:  Normocephalic, atraumatic.  Extraocular movements are intact bilaterally.     NECK:  Nontender with good range of motion.    LUNGS:  Clear to auscultation bilaterally.    HEART:  Regular rate and rhythm.     ABDOMEN:  Soft, non-tender, non-distended.      EXTREMITIES: Bilateral shoulders with skin, clean, dry,intact, active forward flexion to 90 degrees, however actively I can get her shoulders to 180°, abduction to about 90°, able to abduct without pain, negative Fitzpatrick bilaterally, negative empty can testing bilaterally, negative labral shear testing bilaterally, neurovascularly intact      RADIOGRAPHIC FINDINGS:   X-Ray Shoulder 2 or More views Bilateral    Result Date: 2/20/2024  EXAMINATION: XR SHOULDER COMPLETE 2 OR MORE VIEWS BILATERAL CLINICAL HISTORY: Pain in right shoulder COMPARISON: Right shoulder x-ray November 14, 2023 TECHNIQUE: Frontal views of the bilateral shoulder were obtained in internal and external rotation as well as a scapular Y-view of this shoulder . FINDINGS: Mild-to-moderate degenerative change of the right greater than left acromioclavicular joint.  Mild degenerative change of the bilateral glenohumeral joints.  No acute fracture.     As above. Point of Service: [facility] Electronically signed by: Asa Melendez Date:    02/20/2024 Time:    09:04         Patient Active Problem List    Diagnosis Date Noted    Cervical radiculopathy 01/08/2024     Numbness and tingling in right hand 11/14/2023    Cervicalgia 11/14/2023    Right shoulder pain 11/14/2023    Post-menopausal 06/21/2023    Essential hypertension 11/01/2022    Hyperlipidemia 11/01/2022    Type 2 diabetes mellitus without complication, without long-term current use of insulin 11/01/2022    BMI 31.0-31.9,adult 11/01/2022    Trigger finger of right hand 11/01/2022     IMPRESSION AND PLAN:  Bilateral shoulder pain.  Reviewed previous charts from Dr. Martinez.  Discussed with the patient that this weakness is why than likely coming from her neck.  She admits to some shoulder pain but only with activities.  She has reduced range motion that could indicate rotator cuff tendinopathy.  Personally reviewed x-rays today with mild-to-moderate DJD changes in her right shoulder greater than left of both the AC and glenohumeral joints.  Discussed all treatment options with the patient today.  Patient did share that she feels like she is getting better slowly with physical therapy and home exercise program.  Discussed possible steroid injections however she does not want to pursue shots or surgery at this time.  Discussed that she can continue with conservative management.  If she continues to have pain or it worsens over the next month I can see her in the office to reconsider steroid injections.  Follow up p.r.n..    No follow-ups on file.       Carlyn Araujo PA-C      (Subject to voice recognition error, transcription service not allowed)

## 2024-03-06 ENCOUNTER — TELEPHONE (OUTPATIENT)
Dept: SPINE | Facility: CLINIC | Age: 69
End: 2024-03-06
Payer: COMMERCIAL

## 2024-03-06 DIAGNOSIS — M54.12 CERVICAL RADICULOPATHY: Primary | ICD-10-CM

## 2024-03-06 DIAGNOSIS — M79.603 PAIN OF UPPER EXTREMITY, UNSPECIFIED LATERALITY: ICD-10-CM

## 2024-04-12 ENCOUNTER — OFFICE VISIT (OUTPATIENT)
Dept: NEUROLOGY | Facility: CLINIC | Age: 69
End: 2024-04-12
Payer: COMMERCIAL

## 2024-04-12 VITALS
RESPIRATION RATE: 18 BRPM | HEART RATE: 83 BPM | BODY MASS INDEX: 29.44 KG/M2 | HEIGHT: 62 IN | SYSTOLIC BLOOD PRESSURE: 180 MMHG | WEIGHT: 160 LBS | OXYGEN SATURATION: 97 % | DIASTOLIC BLOOD PRESSURE: 88 MMHG

## 2024-04-12 DIAGNOSIS — M54.12 CERVICAL RADICULOPATHY: Primary | ICD-10-CM

## 2024-04-12 DIAGNOSIS — R20.2 PARESTHESIAS: ICD-10-CM

## 2024-04-12 PROCEDURE — 1101F PT FALLS ASSESS-DOCD LE1/YR: CPT | Mod: CPTII,,, | Performed by: PSYCHIATRY & NEUROLOGY

## 2024-04-12 PROCEDURE — 1125F AMNT PAIN NOTED PAIN PRSNT: CPT | Mod: CPTII,,, | Performed by: PSYCHIATRY & NEUROLOGY

## 2024-04-12 PROCEDURE — 3288F FALL RISK ASSESSMENT DOCD: CPT | Mod: CPTII,,, | Performed by: PSYCHIATRY & NEUROLOGY

## 2024-04-12 PROCEDURE — 3077F SYST BP >= 140 MM HG: CPT | Mod: CPTII,,, | Performed by: PSYCHIATRY & NEUROLOGY

## 2024-04-12 PROCEDURE — 4010F ACE/ARB THERAPY RXD/TAKEN: CPT | Mod: CPTII,,, | Performed by: PSYCHIATRY & NEUROLOGY

## 2024-04-12 PROCEDURE — 3079F DIAST BP 80-89 MM HG: CPT | Mod: CPTII,,, | Performed by: PSYCHIATRY & NEUROLOGY

## 2024-04-12 PROCEDURE — 99204 OFFICE O/P NEW MOD 45 MIN: CPT | Mod: S$PBB,,, | Performed by: PSYCHIATRY & NEUROLOGY

## 2024-04-12 PROCEDURE — 99214 OFFICE O/P EST MOD 30 MIN: CPT | Mod: PBBFAC | Performed by: PSYCHIATRY & NEUROLOGY

## 2024-04-12 PROCEDURE — 1159F MED LIST DOCD IN RCRD: CPT | Mod: CPTII,,, | Performed by: PSYCHIATRY & NEUROLOGY

## 2024-04-12 PROCEDURE — 3008F BODY MASS INDEX DOCD: CPT | Mod: CPTII,,, | Performed by: PSYCHIATRY & NEUROLOGY

## 2024-04-12 NOTE — PATIENT INSTRUCTIONS
Recommend gabapentin at dosage and frequency tolerated for pain relief - start taking 300 mg 3-4 x daily  Follow-up with General Orthopedics per Dr. Martinez's recommendation for bilateral shoulder issues  Recommend refer outside hosp for nerve conduction/EMG both Ue's as not offered currently at this facility but pt not desiring such now - If surgery ever recommended then will re-order prior   Wrist splints b/l to prevent flexion /extension    Follow-up three-months

## 2024-04-12 NOTE — PROGRESS NOTES
Subjective:       Patient ID: Dari Valverde is a 69 y.o. female     Chief Complaint:    Chief Complaint   Patient presents with    Pain     Pt. States pain in arm going in hands. Shoulder pain        Allergies:  Lisinopril    Current Medications:    Outpatient Encounter Medications as of 4/12/2024   Medication Sig Dispense Refill    amLODIPine (NORVASC) 10 MG tablet Take 1 tablet (10 mg total) by mouth once daily. 90 tablet 1    atorvastatin (LIPITOR) 40 MG tablet Take 1 tablet (40 mg total) by mouth once daily. 90 tablet 1    blood sugar diagnostic Strp One touch verio flex Use one test strip to check blood glucose  strip 3    blood-glucose meter (ONETOUCH VERIO FLEX START) kit Use as directed to check blood glucose 1 each 0    gabapentin (NEURONTIN) 300 MG capsule Take 1 capsule (300 mg total) by mouth 3 (three) times daily. 90 capsule 11    lancets (LANCETS,THIN) Misc Use one lancet to check blood glucose  each 3    losartan (COZAAR) 25 MG tablet Take 1 tablet (25 mg total) by mouth once daily. 90 tablet 1    metFORMIN (GLUCOPHAGE) 500 MG tablet Take 1 tablet (500 mg total) by mouth 2 (two) times daily. 180 tablet 1    ONETOUCH DELICA PLUS LANCET 33 gauge Misc Apply topically.       No facility-administered encounter medications on file as of 4/12/2024.       History of Present Illness  69-year-old black female in clinic for evaluation of cervical radiculopathy.  Patient has had progressively worsening cervical neck pain with radicular symptoms into both upper extremities right-greater-than-left and significant paresthesias both hands right-greater-than-left.  Recently evaluated by orthopedic surgery spine with no surgical intervention required at this time.  Patient had previously tried physical therapy rehab to no avail and was on low-dose gabapentin 100 mg with minimal improvement of symptoms.  Has been recommended to take gabapentin 300 mg p.o. t.i.d. but she is only taking bid.   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 Valverde  reports that she has never smoked. She has never been exposed to tobacco smoke. She has never used smokeless tobacco. She reports that she does not drink alcohol and does not use drugs.    Family History  Ms.'s Valverde family history includes Dementia in her mother; Heart disease in her father; Stroke in her maternal grandmother.    Review of Systems  Review of Systems   Musculoskeletal:  Positive for back pain and neck pain.   Neurological:  Positive for tingling, sensory change and focal weakness.   All other systems reviewed and are negative.     Objective:   BP (!) 180/88 (BP Location: Right arm, Patient Position: Sitting, BP Method: Large (Manual))   Pulse 83   Resp 18   Ht 5' 2" (1.575 m)   Wt 72.6 kg (160 lb)   SpO2 97%   BMI 29.26 kg/m²    NEUROLOGICAL EXAMINATION:     MENTAL STATUS   Oriented to person, place, and time.   Level of consciousness: alert  Knowledge: consistent with education.     CRANIAL NERVES   Cranial nerves II through XII intact.     MOTOR EXAM        Mild effort dependent weakness in upper extremities and decreased  strength     SENSORY EXAM        Paresthesias in both hands right-greater-than-left     GAIT AND COORDINATION     Gait  Gait: normal       Physical Exam  Vitals reviewed.   Constitutional:       Appearance: She is obese.   Neurological:      Mental Status: She is alert and oriented to person, place, and time. Mental status is at baseline. "      Cranial Nerves: Cranial nerves 2-12 are intact.      Gait: Gait is intact.          Assessment:     Cervical radiculopathy    Paresthesias         Primary Diagnosis and ICD10  Cervical radiculopathy [M54.12]    Plan:     Patient Instructions   Recommend gabapentin at dosage and frequency tolerated for pain relief - start taking 300 mg 3-4 x daily  Follow-up with [facility] per Dr. Martinez's recommendation for bilateral shoulder issues  Recommend refer outside hosp for nerve conduction/EMG both Ue's as not offered currently at this facility but pt not desiring such now   Wrist splints b/l to prevent flexion /extension    Follow-up three-months    There are no discontinued medications.    Requested Prescriptions      No prescriptions requested or ordered in this encounter

## 2024-06-25 ENCOUNTER — PATIENT OUTREACH (OUTPATIENT)
Facility: HOSPITAL | Age: 69
End: 2024-06-25
Payer: COMMERCIAL

## 2024-06-25 DIAGNOSIS — E78.5 HYPERLIPIDEMIA, UNSPECIFIED HYPERLIPIDEMIA TYPE: ICD-10-CM

## 2024-06-25 DIAGNOSIS — E11.9 TYPE 2 DIABETES MELLITUS WITHOUT COMPLICATION, WITHOUT LONG-TERM CURRENT USE OF INSULIN: Primary | ICD-10-CM

## 2024-06-25 DIAGNOSIS — I10 ESSENTIAL HYPERTENSION: ICD-10-CM

## 2024-06-25 NOTE — PROGRESS NOTES
Population Health Chart Review & Patient Outreach Details    Updates Requested / Reviewed:  [x]  Care Team Updated  []  Care Everywhere Updated & Reviewed  []  [facility] Reviewed  []   Reviewed  []  MIIX Reviewed    Hea           HbA1c & Other Labs [x]  Overdue Lab(s) Ordered    []  Overdue Lab(s) Scheduled    []  External Records Uploaded & Care Team Updated if Applicable    []  Primary Care Follow Up Visit Scheduled     []  Reminded Patient to Complete A1c Home Test    []  Patient Declined Scheduling Labs or Will Call Back to Schedule    []  Patient Will Schedule with External Provider / Order Routed, & Care Team Updated if Applicable        [facility]       Blood Pressure Control [x]  Primary Care Follow Up Visit Scheduled     []  Remote Blood Pressure Reading Captured    []  Patient Declined Remote Reading or Scheduling Appt - Escalated to PCP    []  Patient Will Call Back or Send Portal Message with Reading               Colorectal Cancer Screening [x]  Pt is compliant with cologaurd last done 11/23/2021    []  External Records Requested & Care Team Updated if Applicable    []  External Records Uploaded, Care Team Updated, & History Updated if Applicable    []  Patient Declined Completing Colon Cancer Screening    []  Patient Will Schedule with External Provider & Care Team Updated if Applicable    []  Fit Kit Mailed (add the SmartPhrase under additional notes)    []  Reminded Patient to Complete Home Test        Primary Care Appointment [x]  Primary Care Appt Scheduled for 6 month diabetic check up and labs ordered     []  Patient Declined Scheduling or Will Call Back to Schedule    []  Pt Established with External Provider, Updated Care Team, & Informed Pt to Notify Payor if Applicable

## 2024-07-08 NOTE — PROGRESS NOTES
Clinic note     Patient name: Dari Valverde is a 69 y.o. female   Chief compliant   Chief Complaint   Patient presents with    Medication Refill     Med refills. C/o some swelling to legs, concerned it may the gabapentin.  Showed pt how to use BG meter, accu check was 87       Subjective     History of present illness   In clinic for routine follow up and medication refills     Past Medical History: HTN, Type 2 DM, HLD  Lab obtained in clinic on 2024, results reviewed and discussed     Last A1c 6.1  Checking blood glucose: BID and prn   Blood glucose log not in clinic     Spine clinic: Dr BEN Martinez   Neurology: Dr Ling, clinic note reviewed     Discussed safety r/t extreme weather temperatures, also discussed ways to maintain adequate hydration             Social History     Tobacco Use    Smoking status: Never     Passive exposure: Never    Smokeless tobacco: Never   Substance Use Topics    Alcohol use: Never    Drug use: Never       Review of patient's allergies indicates:   Allergen Reactions    Lisinopril      Causes cough        Past Medical History:   Diagnosis Date    Diabetes     Hyperlipemia     Hypertension        Past Surgical History:   Procedure Laterality Date     SECTION      CHOLECYSTECTOMY      TUBAL LIGATION  1982        Family History   Problem Relation Name Age of Onset    Dementia Mother      Heart disease Father Hi Santos     Stroke Maternal Grandmother Na Hoover          Current Outpatient Medications:     blood sugar diagnostic Strp, One touch verio flex Use one test strip to check blood glucose BID, Disp: 180 strip, Rfl: 3    blood-glucose meter (ONETOUCH VERIO FLEX START) kit, Use as directed to check blood glucose, Disp: 1 each, Rfl: 0    gabapentin (NEURONTIN) 300 MG capsule, Take 1 capsule (300 mg total) by mouth 3 (three) times daily., Disp: 90 capsule, Rfl: 11    lancets (LANCETS,THIN) Misc, Use one lancet to check blood glucose BID, Disp: 200 each, Rfl: 3     "amLODIPine (NORVASC) 10 MG tablet, Take 1 tablet (10 mg total) by mouth once daily., Disp: 90 tablet, Rfl: 1    atorvastatin (LIPITOR) 40 MG tablet, Take 1 tablet (40 mg total) by mouth once daily., Disp: 90 tablet, Rfl: 1    losartan (COZAAR) 25 MG tablet, Take 1 tablet (25 mg total) by mouth once daily., Disp: 90 tablet, Rfl: 1    metFORMIN (GLUCOPHAGE) 500 MG tablet, Take 1 tablet (500 mg total) by mouth 2 (two) times daily., Disp: 180 tablet, Rfl: 1    ONETOUCH DELICA PLUS LANCET 33 gauge Misc, Apply topically. (Patient not taking: Reported on 7/9/2024), Disp: , Rfl:     Review of Systems   Constitutional:  Negative for appetite change, chills, fatigue, fever and unexpected weight change.   Respiratory:  Negative for cough and shortness of breath.    Cardiovascular:  Negative for chest pain, palpitations and leg swelling.   Gastrointestinal:  Negative for abdominal pain, change in bowel habit, constipation, diarrhea, nausea and vomiting.   Genitourinary:  Negative for dysuria and frequency.   Musculoskeletal:  Positive for arthralgias (morning stiffness to bilateral hands). Negative for gait problem and myalgias.   Neurological:  Negative for dizziness, syncope, light-headedness and headaches.   Psychiatric/Behavioral:  Negative for dysphoric mood and sleep disturbance. The patient is not nervous/anxious.        Objective     /75   Pulse 87   Ht 5' 2" (1.575 m)   Wt 72.7 kg (160 lb 3.2 oz)   SpO2 99%   BMI 29.30 kg/m²     Physical Exam   Constitutional: She is oriented to person, place, and time. No distress.   HENT:   Head: Atraumatic.   Mouth/Throat: Mucous membranes are moist.   Cardiovascular: Normal rate and regular rhythm. Pulmonary:      Effort: Pulmonary effort is normal. No respiratory distress.      Breath sounds: Normal breath sounds. No wheezing, rhonchi or rales.     Abdominal: Soft. Bowel sounds are normal. She exhibits no distension. There is no abdominal tenderness.   Musculoskeletal:  "        General: Normal range of motion.      Cervical back: Neck supple.      Right lower leg: No edema.      Left lower leg: No edema.   Neurological: She is alert and oriented to person, place, and time. Gait normal.   Skin: Skin is warm and dry.   Psychiatric: Her behavior is normal. Mood normal.       Lab Results   Component Value Date    WBC 6.58 01/08/2024    HGB 11.1 (L) 01/08/2024    HCT 34.3 (L) 01/08/2024    MCV 86.2 01/08/2024     (H) 01/08/2024       CMP  Sodium   Date Value Ref Range Status   07/08/2024 139 136 - 145 mmol/L Final     Potassium   Date Value Ref Range Status   07/08/2024 3.9 3.5 - 5.1 mmol/L Final     Chloride   Date Value Ref Range Status   07/08/2024 110 (H) 98 - 107 mmol/L Final     CO2   Date Value Ref Range Status   07/08/2024 22 21 - 32 mmol/L Final     Glucose   Date Value Ref Range Status   07/08/2024 110 (H) 74 - 106 mg/dL Final     BUN   Date Value Ref Range Status   07/08/2024 10 7 - 18 mg/dL Final     Creatinine   Date Value Ref Range Status   07/08/2024 0.75 0.55 - 1.02 mg/dL Final     Calcium   Date Value Ref Range Status   07/08/2024 9.3 8.5 - 10.1 mg/dL Final     Total Protein   Date Value Ref Range Status   07/08/2024 7.6 6.4 - 8.2 g/dL Final     Albumin   Date Value Ref Range Status   07/08/2024 3.7 3.5 - 5.0 g/dL Final     Bilirubin, Total   Date Value Ref Range Status   07/08/2024 0.3 >0.0 - 1.2 mg/dL Final     Alk Phos   Date Value Ref Range Status   07/08/2024 82 55 - 142 U/L Final     AST   Date Value Ref Range Status   07/08/2024 13 (L) 15 - 37 U/L Final     ALT   Date Value Ref Range Status   07/08/2024 25 13 - 56 U/L Final     Anion Gap   Date Value Ref Range Status   07/08/2024 11 7 - 16 mmol/L Final     eGFR   Date Value Ref Range Status   04/05/2022 80 >=60 mL/min/1.73m² Final     Lab Results   Component Value Date    TSH 1.510 06/21/2023     Lab Results   Component Value Date    CHOL 157 07/08/2024    CHOL 206 (H) 06/21/2023    CHOL 139 04/05/2022      Lab Results   Component Value Date    HDL 66 (H) 07/08/2024    HDL 58 06/21/2023    HDL 70 (H) 04/05/2022     Lab Results   Component Value Date    LDLCALC 72 07/08/2024    LDLCALC 103 06/21/2023    LDLCALC 44 04/05/2022     Lab Results   Component Value Date    TRIG 97 07/08/2024    TRIG 224 (H) 06/21/2023    TRIG 126 04/05/2022     Lab Results   Component Value Date    CHOLHDL 2.4 07/08/2024    CHOLHDL 3.6 06/21/2023    CHOLHDL 2.0 04/05/2022     Lab Results   Component Value Date    HGBA1C 6.1 07/08/2024         Assessment and Plan   Type 2 diabetes mellitus without complication, without long-term current use of insulin  -     metFORMIN (GLUCOPHAGE) 500 MG tablet; Take 1 tablet (500 mg total) by mouth 2 (two) times daily.  Dispense: 180 tablet; Refill: 1    Essential hypertension  -     losartan (COZAAR) 25 MG tablet; Take 1 tablet (25 mg total) by mouth once daily.  Dispense: 90 tablet; Refill: 1  -     amLODIPine (NORVASC) 10 MG tablet; Take 1 tablet (10 mg total) by mouth once daily.  Dispense: 90 tablet; Refill: 1    Hyperlipidemia, unspecified hyperlipidemia type  -     atorvastatin (LIPITOR) 40 MG tablet; Take 1 tablet (40 mg total) by mouth once daily.  Dispense: 90 tablet; Refill: 1          Patient Instructions  Patient Instructions     Refills on routine medications   Follow up on 12/31/2024 and as needed; routine medication will be due 01/05/2025

## 2024-07-09 ENCOUNTER — OFFICE VISIT (OUTPATIENT)
Dept: FAMILY MEDICINE | Facility: CLINIC | Age: 69
End: 2024-07-09
Payer: COMMERCIAL

## 2024-07-09 VITALS
HEART RATE: 87 BPM | HEIGHT: 62 IN | WEIGHT: 160.19 LBS | OXYGEN SATURATION: 99 % | BODY MASS INDEX: 29.48 KG/M2 | DIASTOLIC BLOOD PRESSURE: 75 MMHG | SYSTOLIC BLOOD PRESSURE: 123 MMHG

## 2024-07-09 DIAGNOSIS — E78.5 HYPERLIPIDEMIA, UNSPECIFIED HYPERLIPIDEMIA TYPE: Chronic | ICD-10-CM

## 2024-07-09 DIAGNOSIS — I10 ESSENTIAL HYPERTENSION: Chronic | ICD-10-CM

## 2024-07-09 DIAGNOSIS — E11.9 TYPE 2 DIABETES MELLITUS WITHOUT COMPLICATION, WITHOUT LONG-TERM CURRENT USE OF INSULIN: Primary | Chronic | ICD-10-CM

## 2024-07-09 PROCEDURE — 1159F MED LIST DOCD IN RCRD: CPT | Mod: ,,, | Performed by: NURSE PRACTITIONER

## 2024-07-09 PROCEDURE — 3044F HG A1C LEVEL LT 7.0%: CPT | Mod: ,,, | Performed by: NURSE PRACTITIONER

## 2024-07-09 PROCEDURE — 4010F ACE/ARB THERAPY RXD/TAKEN: CPT | Mod: ,,, | Performed by: NURSE PRACTITIONER

## 2024-07-09 PROCEDURE — 1126F AMNT PAIN NOTED NONE PRSNT: CPT | Mod: ,,, | Performed by: NURSE PRACTITIONER

## 2024-07-09 PROCEDURE — 3078F DIAST BP <80 MM HG: CPT | Mod: ,,, | Performed by: NURSE PRACTITIONER

## 2024-07-09 PROCEDURE — 1160F RVW MEDS BY RX/DR IN RCRD: CPT | Mod: ,,, | Performed by: NURSE PRACTITIONER

## 2024-07-09 PROCEDURE — 3288F FALL RISK ASSESSMENT DOCD: CPT | Mod: ,,, | Performed by: NURSE PRACTITIONER

## 2024-07-09 PROCEDURE — 99214 OFFICE O/P EST MOD 30 MIN: CPT | Mod: ,,, | Performed by: NURSE PRACTITIONER

## 2024-07-09 PROCEDURE — 3008F BODY MASS INDEX DOCD: CPT | Mod: ,,, | Performed by: NURSE PRACTITIONER

## 2024-07-09 PROCEDURE — 1101F PT FALLS ASSESS-DOCD LE1/YR: CPT | Mod: ,,, | Performed by: NURSE PRACTITIONER

## 2024-07-09 PROCEDURE — 3074F SYST BP LT 130 MM HG: CPT | Mod: ,,, | Performed by: NURSE PRACTITIONER

## 2024-07-09 RX ORDER — ATORVASTATIN CALCIUM 40 MG/1
40 TABLET, FILM COATED ORAL DAILY
Qty: 90 TABLET | Refills: 1 | Status: SHIPPED | OUTPATIENT
Start: 2024-07-09 | End: 2024-07-11 | Stop reason: SDUPTHER

## 2024-07-09 RX ORDER — AMLODIPINE BESYLATE 10 MG/1
10 TABLET ORAL DAILY
Qty: 90 TABLET | Refills: 1 | Status: SHIPPED | OUTPATIENT
Start: 2024-07-09 | End: 2024-07-11 | Stop reason: SDUPTHER

## 2024-07-09 RX ORDER — METFORMIN HYDROCHLORIDE 500 MG/1
500 TABLET ORAL 2 TIMES DAILY
Qty: 180 TABLET | Refills: 1 | Status: SHIPPED | OUTPATIENT
Start: 2024-07-09 | End: 2024-07-11 | Stop reason: SDUPTHER

## 2024-07-09 RX ORDER — LOSARTAN POTASSIUM 25 MG/1
25 TABLET ORAL DAILY
Qty: 90 TABLET | Refills: 1 | Status: SHIPPED | OUTPATIENT
Start: 2024-07-09 | End: 2024-07-11 | Stop reason: SDUPTHER

## 2024-07-09 NOTE — PATIENT INSTRUCTIONS
Refills on routine medications   Follow up on 12/31/2024 and as needed; routine medication will be due 01/05/2025

## 2024-07-11 DIAGNOSIS — E78.5 HYPERLIPIDEMIA, UNSPECIFIED HYPERLIPIDEMIA TYPE: Chronic | ICD-10-CM

## 2024-07-11 DIAGNOSIS — I10 ESSENTIAL HYPERTENSION: Chronic | ICD-10-CM

## 2024-07-11 DIAGNOSIS — E11.9 TYPE 2 DIABETES MELLITUS WITHOUT COMPLICATION, WITHOUT LONG-TERM CURRENT USE OF INSULIN: Chronic | ICD-10-CM

## 2024-07-11 RX ORDER — ATORVASTATIN CALCIUM 40 MG/1
40 TABLET, FILM COATED ORAL DAILY
Qty: 90 TABLET | Refills: 1 | Status: SHIPPED | OUTPATIENT
Start: 2024-07-11

## 2024-07-11 RX ORDER — AMLODIPINE BESYLATE 10 MG/1
10 TABLET ORAL DAILY
Qty: 90 TABLET | Refills: 1 | Status: SHIPPED | OUTPATIENT
Start: 2024-07-11

## 2024-07-11 RX ORDER — LOSARTAN POTASSIUM 25 MG/1
25 TABLET ORAL DAILY
Qty: 90 TABLET | Refills: 1 | Status: SHIPPED | OUTPATIENT
Start: 2024-07-11

## 2024-07-11 RX ORDER — METFORMIN HYDROCHLORIDE 500 MG/1
500 TABLET ORAL 2 TIMES DAILY
Qty: 180 TABLET | Refills: 1 | Status: SHIPPED | OUTPATIENT
Start: 2024-07-11

## 2024-07-12 ENCOUNTER — OFFICE VISIT (OUTPATIENT)
Dept: NEUROLOGY | Facility: CLINIC | Age: 69
End: 2024-07-12
Payer: COMMERCIAL

## 2024-07-12 VITALS
HEART RATE: 84 BPM | OXYGEN SATURATION: 99 % | DIASTOLIC BLOOD PRESSURE: 70 MMHG | SYSTOLIC BLOOD PRESSURE: 142 MMHG | WEIGHT: 160 LBS | HEIGHT: 62 IN | RESPIRATION RATE: 18 BRPM | BODY MASS INDEX: 29.44 KG/M2

## 2024-07-12 DIAGNOSIS — R20.2 PARESTHESIAS: Primary | ICD-10-CM

## 2024-07-12 DIAGNOSIS — M54.12 CERVICAL RADICULOPATHY: ICD-10-CM

## 2024-07-12 PROCEDURE — 1126F AMNT PAIN NOTED NONE PRSNT: CPT | Mod: CPTII,,, | Performed by: PSYCHIATRY & NEUROLOGY

## 2024-07-12 PROCEDURE — 4010F ACE/ARB THERAPY RXD/TAKEN: CPT | Mod: CPTII,,, | Performed by: PSYCHIATRY & NEUROLOGY

## 2024-07-12 PROCEDURE — 3078F DIAST BP <80 MM HG: CPT | Mod: CPTII,,, | Performed by: PSYCHIATRY & NEUROLOGY

## 2024-07-12 PROCEDURE — 3288F FALL RISK ASSESSMENT DOCD: CPT | Mod: CPTII,,, | Performed by: PSYCHIATRY & NEUROLOGY

## 2024-07-12 PROCEDURE — 3077F SYST BP >= 140 MM HG: CPT | Mod: CPTII,,, | Performed by: PSYCHIATRY & NEUROLOGY

## 2024-07-12 PROCEDURE — 1160F RVW MEDS BY RX/DR IN RCRD: CPT | Mod: CPTII,,, | Performed by: PSYCHIATRY & NEUROLOGY

## 2024-07-12 PROCEDURE — 99215 OFFICE O/P EST HI 40 MIN: CPT | Mod: PBBFAC | Performed by: PSYCHIATRY & NEUROLOGY

## 2024-07-12 PROCEDURE — 99999 PR PBB SHADOW E&M-EST. PATIENT-LVL V: CPT | Mod: PBBFAC,,, | Performed by: PSYCHIATRY & NEUROLOGY

## 2024-07-12 PROCEDURE — 1159F MED LIST DOCD IN RCRD: CPT | Mod: CPTII,,, | Performed by: PSYCHIATRY & NEUROLOGY

## 2024-07-12 PROCEDURE — 99214 OFFICE O/P EST MOD 30 MIN: CPT | Mod: S$PBB,,, | Performed by: PSYCHIATRY & NEUROLOGY

## 2024-07-12 PROCEDURE — 3008F BODY MASS INDEX DOCD: CPT | Mod: CPTII,,, | Performed by: PSYCHIATRY & NEUROLOGY

## 2024-07-12 PROCEDURE — 3044F HG A1C LEVEL LT 7.0%: CPT | Mod: CPTII,,, | Performed by: PSYCHIATRY & NEUROLOGY

## 2024-07-12 PROCEDURE — 1101F PT FALLS ASSESS-DOCD LE1/YR: CPT | Mod: CPTII,,, | Performed by: PSYCHIATRY & NEUROLOGY

## 2024-07-12 NOTE — PROGRESS NOTES
Subjective:       Patient ID: Dari Valverde is a 69 y.o. female     Chief Complaint:    Chief Complaint   Patient presents with    numbness and tingling in left hand     Pt. States no changes in hand. swelling         Allergies:  Lisinopril    Current Medications:    Outpatient Encounter Medications as of 7/12/2024   Medication Sig Dispense Refill    amLODIPine (NORVASC) 10 MG tablet Take 1 tablet (10 mg total) by mouth once daily. 90 tablet 1    atorvastatin (LIPITOR) 40 MG tablet Take 1 tablet (40 mg total) by mouth once daily. 90 tablet 1    blood sugar diagnostic Strp One touch verio flex Use one test strip to check blood glucose  strip 3    blood-glucose meter (ONETOUCH VERIO FLEX START) kit Use as directed to check blood glucose 1 each 0    gabapentin (NEURONTIN) 300 MG capsule Take 1 capsule (300 mg total) by mouth 3 (three) times daily. 90 capsule 11    lancets (LANCETS,THIN) Misc Use one lancet to check blood glucose  each 3    losartan (COZAAR) 25 MG tablet Take 1 tablet (25 mg total) by mouth once daily. 90 tablet 1    metFORMIN (GLUCOPHAGE) 500 MG tablet Take 1 tablet (500 mg total) by mouth 2 (two) times daily. 180 tablet 1    ONETOUCH DELICA PLUS LANCET 33 gauge Misc Apply topically.      [DISCONTINUED] amLODIPine (NORVASC) 10 MG tablet Take 1 tablet (10 mg total) by mouth once daily. 90 tablet 1    [DISCONTINUED] amLODIPine (NORVASC) 10 MG tablet Take 1 tablet (10 mg total) by mouth once daily. 90 tablet 1    [DISCONTINUED] atorvastatin (LIPITOR) 40 MG tablet Take 1 tablet (40 mg total) by mouth once daily. 90 tablet 1    [DISCONTINUED] atorvastatin (LIPITOR) 40 MG tablet Take 1 tablet (40 mg total) by mouth once daily. 90 tablet 1    [DISCONTINUED] losartan (COZAAR) 25 MG tablet Take 1 tablet (25 mg total) by mouth once daily. 90 tablet 1    [DISCONTINUED] losartan (COZAAR) 25 MG tablet Take 1 tablet (25 mg total) by mouth once daily. 90 tablet 1    [DISCONTINUED] metFORMIN  "(GLUCOPHAGE) 500 MG tablet Take 1 tablet (500 mg total) by mouth 2 (two) times daily. 180 tablet 1    [DISCONTINUED] metFORMIN (GLUCOPHAGE) 500 MG tablet Take 1 tablet (500 mg total) by mouth 2 (two) times daily. 180 tablet 1     No facility-administered encounter medications on file as of 2024.       History of Present Illness  70 yo BF w/ prev cervical radiculopathy much improved on José Antonio 300 mg bid   Now having parestheisas pain and numbness both hands L>R   Will refer to NCV/EMG at [facility]         Past Medical History:   Diagnosis Date    Diabetes     Hyperlipemia     Hypertension        Past Surgical History:   Procedure Laterality Date     SECTION      CHOLECYSTECTOMY      TUBAL LIGATION         Social History  Ms. Valverde  reports that she has never smoked. She has never been exposed to tobacco smoke. She has never used smokeless tobacco. She reports that she does not drink alcohol and does not use drugs.    Family History  Ms.'s Valverde family history includes Dementia in her mother; Heart disease in her father; Stroke in her maternal grandmother.    Review of Systems  Review of Systems   Musculoskeletal:  Positive for joint pain and neck pain.   Neurological:  Positive for tingling and sensory change.   All other systems reviewed and are negative.     Objective:   BP (!) 142/70 (BP Location: Right arm, Patient Position: Sitting, BP Method: Large (Manual))   Pulse 84   Resp 18   Ht 5' 2" (1.575 m)   Wt 72.6 kg (160 lb)   SpO2 99%   BMI 29.26 kg/m²    NEUROLOGICAL EXAMINATION:     MENTAL STATUS   Oriented to person, place, and time.   Level of consciousness: alert  Knowledge: good.     CRANIAL NERVES   Cranial nerves II through XII intact.     MOTOR EXAM     Strength   Strength 5/5 except as noted.        Weak L  strength     SENSORY EXAM        Paresthesisa in both hands      GAIT AND COORDINATION     Gait  Gait: normal       Physical Exam  Vitals reviewed.   Constitutional:       " Appearance: She is normal weight.   Neurological:      Mental Status: She is oriented to person, place, and time. Mental status is at baseline.      Cranial Nerves: Cranial nerves 2-12 are intact.      Gait: Gait is intact.          Assessment:     Cervical radiculopathy    Paresthesias         Primary Diagnosis and ICD10  Cervical radiculopathy [M54.12]    Plan:     Patient Instructions   Cotn Marisa 300mg 2-3 x daily  NCV/EMG at [place] of both Ue's   F/u 3 motnsh    There are no discontinued medications.    Requested Prescriptions      No prescriptions requested or ordered in this encounter

## 2024-08-09 DIAGNOSIS — Z71.89 COMPLEX CARE COORDINATION: ICD-10-CM

## 2024-09-03 ENCOUNTER — OFFICE VISIT (OUTPATIENT)
Dept: OBSTETRICS AND GYNECOLOGY | Facility: CLINIC | Age: 69
End: 2024-09-03
Payer: COMMERCIAL

## 2024-09-03 VITALS
DIASTOLIC BLOOD PRESSURE: 80 MMHG | BODY MASS INDEX: 29.81 KG/M2 | HEIGHT: 62 IN | HEART RATE: 78 BPM | WEIGHT: 162 LBS | OXYGEN SATURATION: 100 % | RESPIRATION RATE: 19 BRPM | SYSTOLIC BLOOD PRESSURE: 140 MMHG

## 2024-09-03 DIAGNOSIS — N81.2 SECOND DEGREE UTERINE PROLAPSE: Primary | ICD-10-CM

## 2024-09-03 PROCEDURE — 3079F DIAST BP 80-89 MM HG: CPT | Mod: CPTII,,, | Performed by: ADVANCED PRACTICE MIDWIFE

## 2024-09-03 PROCEDURE — 3008F BODY MASS INDEX DOCD: CPT | Mod: CPTII,,, | Performed by: ADVANCED PRACTICE MIDWIFE

## 2024-09-03 PROCEDURE — 99203 OFFICE O/P NEW LOW 30 MIN: CPT | Mod: S$PBB,,, | Performed by: ADVANCED PRACTICE MIDWIFE

## 2024-09-03 PROCEDURE — 1101F PT FALLS ASSESS-DOCD LE1/YR: CPT | Mod: CPTII,,, | Performed by: ADVANCED PRACTICE MIDWIFE

## 2024-09-03 PROCEDURE — 99999 PR PBB SHADOW E&M-EST. PATIENT-LVL IV: CPT | Mod: PBBFAC,,, | Performed by: ADVANCED PRACTICE MIDWIFE

## 2024-09-03 PROCEDURE — 3077F SYST BP >= 140 MM HG: CPT | Mod: CPTII,,, | Performed by: ADVANCED PRACTICE MIDWIFE

## 2024-09-03 PROCEDURE — 3044F HG A1C LEVEL LT 7.0%: CPT | Mod: CPTII,,, | Performed by: ADVANCED PRACTICE MIDWIFE

## 2024-09-03 PROCEDURE — 1159F MED LIST DOCD IN RCRD: CPT | Mod: CPTII,,, | Performed by: ADVANCED PRACTICE MIDWIFE

## 2024-09-03 PROCEDURE — 99214 OFFICE O/P EST MOD 30 MIN: CPT | Mod: PBBFAC | Performed by: ADVANCED PRACTICE MIDWIFE

## 2024-09-03 PROCEDURE — 3288F FALL RISK ASSESSMENT DOCD: CPT | Mod: CPTII,,, | Performed by: ADVANCED PRACTICE MIDWIFE

## 2024-09-03 PROCEDURE — 4010F ACE/ARB THERAPY RXD/TAKEN: CPT | Mod: CPTII,,, | Performed by: ADVANCED PRACTICE MIDWIFE

## 2024-09-03 NOTE — PROGRESS NOTES
"Subjective     Patient ID: Dari Valverde is a 69 y.o. female.    Chief Complaint: pelvic problem (States that it " feels like something is trying to come out." Stated that she has pressure with a white discharge sometimes. Denies any pain, odor, itching or burning.)    C/O "feeling like something is coming out of her".  Pressure began in may and has progressively gotten worse. Pt states she walks 3-4 days per week and pressure is worse with activity.   Pressure with occasional white discharge  Denies any pelvic pain, odor, itching or burning.   Denies problems with constipation.   Patient is SA , denies any pain with intercourse.       Gynecologic Exam  The patient's pertinent negatives include no genital itching, genital lesions, genital odor, genital rash, missed menses, pelvic pain, vaginal bleeding or vaginal discharge. This is a chronic problem. The current episode started more than 1 month ago. The problem occurs daily. The problem has been gradually worsening. The patient is experiencing no pain. She is not pregnant. Associated symptoms include anorexia, frequency, painful intercourse and urgency. Pertinent negatives include no abdominal pain, back pain, chills, constipation, diarrhea, discolored urine, dysuria, fever, flank pain, headaches, hematuria, nausea, rash or vomiting. There has been no bleeding. She has not been passing clots. She has not been passing tissue. The symptoms are aggravated by activity. She has tried nothing for the symptoms. She is sexually active. No, her partner does not have an STD. She is postmenopausal. Her past medical history is significant for a  section and an ectopic pregnancy. There is no history of an abdominal surgery, endometriosis, a gynecological surgery, herpes simplex, menorrhagia, metrorrhagia, miscarriage, ovarian cysts, perineal abscess, PID, an STD, a terminated pregnancy or vaginosis.     Review of Systems   Constitutional: Negative.  Negative for " "chills and fever.   HENT: Negative.     Eyes: Negative.    Respiratory: Negative.     Cardiovascular: Negative.    Gastrointestinal:  Positive for anorexia. Negative for abdominal pain, constipation, diarrhea, nausea and vomiting.   Endocrine: Negative.    Genitourinary:  Positive for frequency and urgency. Negative for dysuria, flank pain, hematuria, menorrhagia, missed menses, pelvic pain and vaginal discharge.   Musculoskeletal: Negative.  Negative for back pain.   Integumentary:  Negative for rash. Negative.   Allergic/Immunologic: Negative.    Neurological: Negative.  Negative for headaches.   Psychiatric/Behavioral: Negative.       Past Medical History:   Diagnosis Date    Diabetes     Hyperlipemia     Hypertension      Past Surgical History:   Procedure Laterality Date     SECTION      CHOLECYSTECTOMY      TUBAL LIGATION        OB History    Para Term  AB Living   3 3 3         SAB IAB Ectopic Multiple Live Births                  # Outcome Date GA Lbr Mak/2nd Weight Sex Type Anes PTL Lv   3 Term      CS-Unspec         Birth Comments: "cord around baby's neck"   2 Term      Vag-Spont      1 Term      Vag-Spont         Current Outpatient Medications   Medication Instructions    amLODIPine (NORVASC) 10 mg, Oral, Daily    atorvastatin (LIPITOR) 40 mg, Oral, Daily    blood sugar diagnostic Strp One touch verio flex Use one test strip to check blood glucose BID    blood-glucose meter (ONETOUCH VERIO FLEX START) kit Use as directed to check blood glucose    gabapentin (NEURONTIN) 300 mg, Oral, 3 times daily    lancets (LANCETS,THIN) Misc Use one lancet to check blood glucose BID    losartan (COZAAR) 25 mg, Oral, Daily    metFORMIN (GLUCOPHAGE) 500 mg, Oral, 2 times daily    ONETOUCH DELICA PLUS LANCET 33 gauge Misc Topical (Top)         Objective   Vitals:    24 1320   BP: (!) 140/80   Pulse: 78   Resp: 19     Physical Exam  Vitals reviewed. Exam conducted with a chaperone present. "   Constitutional:       Appearance: Normal appearance.   HENT:      Head: Normocephalic.   Cardiovascular:      Rate and Rhythm: Normal rate and regular rhythm.   Pulmonary:      Effort: Pulmonary effort is normal.      Breath sounds: Normal breath sounds.   Chest:   Breasts:     Right: Normal. No mass.      Left: Normal. No mass.   Abdominal:      General: Abdomen is flat.      Palpations: Abdomen is soft.   Genitourinary:     General: Normal vulva.      Exam position: Lithotomy position.      Cervix: No cervical motion tenderness, discharge, lesion, erythema or cervical bleeding.      Uterus: With uterine prolapse (2nd degree prolapse). Not tender.       Adnexa: Right adnexa normal and left adnexa normal.        Right: No mass or tenderness.          Left: No mass or tenderness.     Musculoskeletal:         General: Normal range of motion.      Cervical back: Normal range of motion.   Skin:     General: Skin is warm and dry.   Neurological:      Mental Status: She is alert and oriented to person, place, and time.   Psychiatric:         Mood and Affect: Mood normal.         Behavior: Behavior normal.          Assessment and Plan   1. Second degree uterine prolapse      Discussed with pt options for mgt including surgical management with recommended referral to UroGyn specialists if she wishes to proceed.  Discussed less invasive mgt with pessary with referral to Dr Martinez for this.    Pt wishes to proceed with pessary placement currently and will consider possible surgical management if she is not satisfied with results from pessary.   Discussed pt status with Dr Martinez and she is in agreement.  No heavy lifting or straining  Prevent constipation       Follow up if symptoms worsen or fail to improve, for Appointment with Dr Martinez next week for pessary placement.

## 2024-09-11 ENCOUNTER — PROCEDURE VISIT (OUTPATIENT)
Dept: OBSTETRICS AND GYNECOLOGY | Facility: CLINIC | Age: 69
End: 2024-09-11
Payer: COMMERCIAL

## 2024-09-11 VITALS
SYSTOLIC BLOOD PRESSURE: 166 MMHG | OXYGEN SATURATION: 96 % | HEART RATE: 88 BPM | HEIGHT: 62 IN | BODY MASS INDEX: 29.81 KG/M2 | WEIGHT: 162 LBS | DIASTOLIC BLOOD PRESSURE: 75 MMHG

## 2024-09-11 DIAGNOSIS — N81.6 RECTOCELE: ICD-10-CM

## 2024-09-11 DIAGNOSIS — N81.3 THIRD DEGREE UTERINE PROLAPSE: ICD-10-CM

## 2024-09-11 DIAGNOSIS — N81.89 PELVIC FLOOR RELAXATION: ICD-10-CM

## 2024-09-11 DIAGNOSIS — N95.2 ATROPHIC VAGINITIS: Primary | ICD-10-CM

## 2024-09-11 PROCEDURE — 57160 INSERT PESSARY/OTHER DEVICE: CPT | Mod: S$PBB,,, | Performed by: OBSTETRICS & GYNECOLOGY

## 2024-09-11 PROCEDURE — 57160 INSERT PESSARY/OTHER DEVICE: CPT | Mod: PBBFAC | Performed by: OBSTETRICS & GYNECOLOGY

## 2024-09-11 RX ORDER — ESTRADIOL 0.1 MG/G
1 CREAM VAGINAL
Qty: 42.5 G | Refills: 2 | Status: SHIPPED | OUTPATIENT
Start: 2024-09-12 | End: 2025-12-02

## 2024-09-11 NOTE — PROCEDURES
PESSARY FITTING:    Dari Valverde is a 69 y.o. female   presents for a pessary fitting and has no complaints of incontinence, bleeding, discharge.    PRE-PESSARY FITTING COUNSELING:  The patient was informed of the risks of pain or discomfort, continuous incontinence, urinary retention with insertion of a pessary and malodorous discharge and/or possible bleeding from granulation tissue after wearing the pessary for sometime. She was counseled on the alternatives to pessary insertion, including surgery, pelvic floor PT, or no treatment with continued symptoms, and she agrees to proceed.    PROCEDURE:  A time out was performed.  Pelvic exam revealed third degree uterine prolapse with the cervix at the vaginal introitus. 2nd degree rectocele noted. Cystocele difficult to evaluate due to the severity of the uterine prolapse. No vaginal discharge. Mild atrophic vaginitis noted. Uterus about 6 week size, mobile, and nontender. No fundal TTP. No adnexal TTP. No adnexal masses palpated.  Based on the exam, first a size 5 ring with support tried.   She ambulated and had discomfort in the vaginal area on one side. This was removed and then a smaller pessary size 4 ring with support tried. She walked around and when she sat down, it pushed out. This was removed. Then a 70 mm gelhorn pessary was placed. She ambulated with this and sat down and this was comfortable to her. Exam after ambulation revealed the pessary was in the correct position. She was sent home with this pessary.    The patient was able to sit, stand, walk and either cough or urinate on the toilet after the procedure normally without expelling the pessary.  The patient tolerated the procedure well.    ASSESSMENT:  Pelvic relaxation managed with pessary.        PLAN:    LABS AND TESTS ORDERED: None.    Discussed referral to pelvic floor PT and/or referral to Urogyn for definitive surgical management. She declines this at this time.      MEDICATIONS  PRESCRIBED: Estradiol vaginal cream twice a week at bedtime.      POST PESSARY CHECK COUNSELING:  Report worsening urinary incontinence, urinary retention, pain, fever, foul smelling discharge or bleeding.  Importance of keeping follow-up appointments for a pessary check stressed.    She was counseled how to remove the pessary and clean it and put it back it. She was also counseled to remove it for sex. Lastly, she was counseled that she could come in q 3 months for pessary cleaning and check. She is sexually active with her  and plans to try to remove and replace it at home.    FOLLOW-UP:  2 weeks for follow up and placement of permanent pessary.     Cammy Martinez MD

## 2024-09-24 ENCOUNTER — OFFICE VISIT (OUTPATIENT)
Dept: OBSTETRICS AND GYNECOLOGY | Facility: CLINIC | Age: 69
End: 2024-09-24
Payer: COMMERCIAL

## 2024-09-24 VITALS
BODY MASS INDEX: 29.81 KG/M2 | DIASTOLIC BLOOD PRESSURE: 75 MMHG | SYSTOLIC BLOOD PRESSURE: 175 MMHG | OXYGEN SATURATION: 97 % | HEART RATE: 80 BPM | HEIGHT: 62 IN | WEIGHT: 162 LBS

## 2024-09-24 DIAGNOSIS — N95.2 ATROPHIC VAGINITIS: ICD-10-CM

## 2024-09-24 DIAGNOSIS — N81.6 RECTOCELE: ICD-10-CM

## 2024-09-24 DIAGNOSIS — N81.89 PELVIC FLOOR RELAXATION: ICD-10-CM

## 2024-09-24 DIAGNOSIS — N81.3 THIRD DEGREE UTERINE PROLAPSE: Primary | ICD-10-CM

## 2024-09-24 PROCEDURE — 99213 OFFICE O/P EST LOW 20 MIN: CPT | Mod: S$PBB,,, | Performed by: OBSTETRICS & GYNECOLOGY

## 2024-09-24 PROCEDURE — 3044F HG A1C LEVEL LT 7.0%: CPT | Mod: CPTII,,, | Performed by: OBSTETRICS & GYNECOLOGY

## 2024-09-24 PROCEDURE — 3077F SYST BP >= 140 MM HG: CPT | Mod: CPTII,,, | Performed by: OBSTETRICS & GYNECOLOGY

## 2024-09-24 PROCEDURE — 1160F RVW MEDS BY RX/DR IN RCRD: CPT | Mod: CPTII,,, | Performed by: OBSTETRICS & GYNECOLOGY

## 2024-09-24 PROCEDURE — 99999 PR PBB SHADOW E&M-EST. PATIENT-LVL IV: CPT | Mod: PBBFAC,,, | Performed by: OBSTETRICS & GYNECOLOGY

## 2024-09-24 PROCEDURE — 99214 OFFICE O/P EST MOD 30 MIN: CPT | Mod: PBBFAC | Performed by: OBSTETRICS & GYNECOLOGY

## 2024-09-24 PROCEDURE — 3078F DIAST BP <80 MM HG: CPT | Mod: CPTII,,, | Performed by: OBSTETRICS & GYNECOLOGY

## 2024-09-24 PROCEDURE — 3008F BODY MASS INDEX DOCD: CPT | Mod: CPTII,,, | Performed by: OBSTETRICS & GYNECOLOGY

## 2024-09-24 PROCEDURE — 1159F MED LIST DOCD IN RCRD: CPT | Mod: CPTII,,, | Performed by: OBSTETRICS & GYNECOLOGY

## 2024-09-24 PROCEDURE — 4010F ACE/ARB THERAPY RXD/TAKEN: CPT | Mod: CPTII,,, | Performed by: OBSTETRICS & GYNECOLOGY

## 2024-09-24 NOTE — PROGRESS NOTES
PESSARY CHECK:    Dari Valverde is a 69 y.o. female   presents for a pessary check and has no complaints of incontinence, bleeding, discharge.    PRE-PESSARY CHECK COUNSELING:  The patient was informed of the risks of pain or discomfort, continuous incontinence, urinary retention with insertion of a pessary and malodorous discharge and/or possible bleeding from granulation tissue after wearing the pessary for sometime. She was counseled on the alternatives to pessary insertion, including surgery or no treatment with continued symptoms, and she agrees to proceed.    PROCEDURE:  A time out was performed.  The pessary was removed and cleaned/rinsed.  There was not granulation tissue present.  There was not discharge present.  The pessary was re-inserted.   The patient was able to sit, stand, walk and either cough or urinate on the toilet after the procedure normally without expelling the pessary.  The patient tolerated the procedure well.    ASSESSMENT:  Pelvic relaxation managed with pessary.        PLAN:    LABS AND TESTS ORDERED:      MEDICATIONS PRESCRIBED:    POST PESSARY CHECK COUNSELING:  Report worsening urinary incontinence, urinary retention, pain, fever, foul smelling discharge or bleeding.  Importance of keeping follow-up appointments for a pessary check stressed.    FOLLOW-UP:  In 4 weeks for pessary check.    Reviewed with the patient how to remove herself in order to have sex. However, she is not able to successfully at home. Pessary placed. RTC in 4 weeks. If she is not able to do it in 4 weeks, then plan to refer to Urogyn.     Cammy Martinez MD     Answers submitted by the patient for this visit:  Gynecologic Exam Questionnaire  (Submitted on 2024)  Chief Complaint: Gynecologic exam  genital itching: No  genital lesions: No  genital odor: No  genital rash: No  missed menses: No  pelvic pain: No  vaginal bleeding: No  vaginal discharge: No  Onset: more than 1 month ago  Pain severity: no  pain  Pregnant now?: No  abdominal pain: No  anorexia: No  back pain: No  chills: No  constipation: No  diarrhea: No  discolored urine: No  dysuria: No  fever: No  flank pain: No  frequency: No  headaches: No  hematuria: No  nausea: No  painful intercourse: No  rash: No  urgency: Yes  vomiting: No  Vaginal bleeding: no bleeding  Passing clots?: No  Passing tissue?: No  Sexual activity: sexually active  Partner with STD symptoms: no  Menstrual history: postmenopausal   section: Yes

## 2024-10-01 DIAGNOSIS — N95.2 ATROPHIC VAGINITIS: ICD-10-CM

## 2024-10-02 RX ORDER — ESTRADIOL 0.1 MG/G
1 CREAM VAGINAL
Qty: 42.5 G | Refills: 2 | Status: SHIPPED | OUTPATIENT
Start: 2024-10-03

## 2024-10-02 RX ORDER — GABAPENTIN 300 MG/1
300 CAPSULE ORAL 3 TIMES DAILY
Qty: 270 CAPSULE | Refills: 2 | Status: SHIPPED | OUTPATIENT
Start: 2024-10-02

## 2024-10-14 ENCOUNTER — OFFICE VISIT (OUTPATIENT)
Dept: NEUROLOGY | Facility: CLINIC | Age: 69
End: 2024-10-14
Payer: COMMERCIAL

## 2024-10-14 VITALS
OXYGEN SATURATION: 98 % | RESPIRATION RATE: 18 BRPM | DIASTOLIC BLOOD PRESSURE: 72 MMHG | HEART RATE: 78 BPM | WEIGHT: 162 LBS | BODY MASS INDEX: 29.63 KG/M2 | SYSTOLIC BLOOD PRESSURE: 140 MMHG

## 2024-10-14 DIAGNOSIS — M19.90 ARTHRITIS: ICD-10-CM

## 2024-10-14 DIAGNOSIS — R20.2 NUMBNESS AND TINGLING IN RIGHT HAND: Primary | ICD-10-CM

## 2024-10-14 DIAGNOSIS — R20.0 NUMBNESS AND TINGLING IN RIGHT HAND: Primary | ICD-10-CM

## 2024-10-14 PROCEDURE — 3288F FALL RISK ASSESSMENT DOCD: CPT | Mod: CPTII,,, | Performed by: PSYCHIATRY & NEUROLOGY

## 2024-10-14 PROCEDURE — 4010F ACE/ARB THERAPY RXD/TAKEN: CPT | Mod: CPTII,,, | Performed by: PSYCHIATRY & NEUROLOGY

## 2024-10-14 PROCEDURE — 99214 OFFICE O/P EST MOD 30 MIN: CPT | Mod: PBBFAC | Performed by: PSYCHIATRY & NEUROLOGY

## 2024-10-14 PROCEDURE — 99214 OFFICE O/P EST MOD 30 MIN: CPT | Mod: S$PBB,,, | Performed by: PSYCHIATRY & NEUROLOGY

## 2024-10-14 PROCEDURE — 3078F DIAST BP <80 MM HG: CPT | Mod: CPTII,,, | Performed by: PSYCHIATRY & NEUROLOGY

## 2024-10-14 PROCEDURE — 1159F MED LIST DOCD IN RCRD: CPT | Mod: CPTII,,, | Performed by: PSYCHIATRY & NEUROLOGY

## 2024-10-14 PROCEDURE — 3077F SYST BP >= 140 MM HG: CPT | Mod: CPTII,,, | Performed by: PSYCHIATRY & NEUROLOGY

## 2024-10-14 PROCEDURE — 3044F HG A1C LEVEL LT 7.0%: CPT | Mod: CPTII,,, | Performed by: PSYCHIATRY & NEUROLOGY

## 2024-10-14 PROCEDURE — 1126F AMNT PAIN NOTED NONE PRSNT: CPT | Mod: CPTII,,, | Performed by: PSYCHIATRY & NEUROLOGY

## 2024-10-14 PROCEDURE — 1160F RVW MEDS BY RX/DR IN RCRD: CPT | Mod: CPTII,,, | Performed by: PSYCHIATRY & NEUROLOGY

## 2024-10-14 PROCEDURE — 99999 PR PBB SHADOW E&M-EST. PATIENT-LVL IV: CPT | Mod: PBBFAC,,, | Performed by: PSYCHIATRY & NEUROLOGY

## 2024-10-14 PROCEDURE — 1101F PT FALLS ASSESS-DOCD LE1/YR: CPT | Mod: CPTII,,, | Performed by: PSYCHIATRY & NEUROLOGY

## 2024-10-14 PROCEDURE — 3008F BODY MASS INDEX DOCD: CPT | Mod: CPTII,,, | Performed by: PSYCHIATRY & NEUROLOGY

## 2024-10-14 NOTE — PROGRESS NOTES
Subjective:       Patient ID: Dari Valverde is a 69 y.o. female     Chief Complaint:    Chief Complaint   Patient presents with    Memory Loss    Numbness     Pt. States         Allergies:  Lisinopril    Current Medications:    Outpatient Encounter Medications as of 10/14/2024   Medication Sig Dispense Refill    amLODIPine (NORVASC) 10 MG tablet Take 1 tablet (10 mg total) by mouth once daily. 90 tablet 1    atorvastatin (LIPITOR) 40 MG tablet Take 1 tablet (40 mg total) by mouth once daily. 90 tablet 1    blood sugar diagnostic Strp One touch verio flex Use one test strip to check blood glucose  strip 3    blood-glucose meter (ONETOUCH VERIO FLEX START) kit Use as directed to check blood glucose 1 each 0    estradioL (ESTRACE) 0.01 % (0.1 mg/gram) vaginal cream Place 1 g vaginally twice a week. 42.5 g 2    gabapentin (NEURONTIN) 300 MG capsule Take 1 capsule (300 mg total) by mouth 3 (three) times daily. 270 capsule 2    lancets (LANCETS,THIN) Misc Use one lancet to check blood glucose  each 3    losartan (COZAAR) 25 MG tablet Take 1 tablet (25 mg total) by mouth once daily. 90 tablet 1    metFORMIN (GLUCOPHAGE) 500 MG tablet Take 1 tablet (500 mg total) by mouth 2 (two) times daily. 180 tablet 1    ONETOUCH DELICA PLUS LANCET 33 gauge Misc Apply topically.      [DISCONTINUED] estradioL (ESTRACE) 0.01 % (0.1 mg/gram) vaginal cream Place 1 g vaginally twice a week. 42.5 g 2    [DISCONTINUED] gabapentin (NEURONTIN) 300 MG capsule Take 1 capsule (300 mg total) by mouth 3 (three) times daily. 90 capsule 11     No facility-administered encounter medications on file as of 10/14/2024.       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 Valverde  reports that she has never smoked. She has never been exposed to tobacco smoke. She has never used smokeless tobacco. She reports that she does not drink alcohol and does not use drugs.    Family History  Ms.'s Valverde family history includes Dementia in her mother; Heart disease in her father; Stroke in her maternal grandmother.    Review of Systems  Review of Systems   Musculoskeletal:  Positive for joint pain and myalgias.   Neurological:  Positive for tingling and sensory change.   All other systems reviewed and are negative.     Objective:   BP (!) 140/72   Pulse 78   Resp 18   Wt 73.5 kg (162 lb)   SpO2 98%   BMI 29.63 kg/m²    NEUROLOGICAL EXAMINATION:     MENTAL STATUS   Oriented to person, place, and time.   Level of consciousness: alert  Knowledge: consistent with education.     CRANIAL NERVES   Cranial nerves II through XII intact.     MOTOR EXAM     Strength   Strength 5/5 throughout.     SENSORY EXAM        Mild paresthesias in both hands      GAIT AND COORDINATION     Gait  Gait: normal       Physical Exam  Vitals reviewed.   Constitutional:       Appearance: She is obese.   Neurological:      Mental Status: She is alert and oriented to person, place, and time. Mental status is at baseline.      Cranial Nerves: Cranial nerves 2-12 are intact.      Motor: Motor strength is normal.     Gait: Gait is intact.          Assessment:     Numbness and tingling in right hand    Arthritis         Primary Diagnosis and ICD10  Numbness and tingling in right hand [R20.0, R20.2]    Plan:     There are no Patient Instructions on file for this visit.    There are no discontinued medications.    Requested Prescriptions       No prescriptions requested or ordered in this encounter

## 2024-10-24 ENCOUNTER — OFFICE VISIT (OUTPATIENT)
Dept: OBSTETRICS AND GYNECOLOGY | Facility: CLINIC | Age: 69
End: 2024-10-24
Payer: COMMERCIAL

## 2024-10-24 VITALS
HEART RATE: 77 BPM | DIASTOLIC BLOOD PRESSURE: 80 MMHG | SYSTOLIC BLOOD PRESSURE: 181 MMHG | WEIGHT: 163 LBS | OXYGEN SATURATION: 98 % | BODY MASS INDEX: 29.81 KG/M2

## 2024-10-24 DIAGNOSIS — B37.31 VAGINAL CANDIDA: Primary | ICD-10-CM

## 2024-10-24 DIAGNOSIS — N81.6 RECTOCELE: ICD-10-CM

## 2024-10-24 DIAGNOSIS — N81.3 THIRD DEGREE UTERINE PROLAPSE: Primary | ICD-10-CM

## 2024-10-24 DIAGNOSIS — N89.8 VAGINAL DISCHARGE: ICD-10-CM

## 2024-10-24 LAB
CANDIDA SPECIES: POSITIVE
GARDNERELLA: NEGATIVE
TRICHOMONAS: NEGATIVE

## 2024-10-24 PROCEDURE — 99214 OFFICE O/P EST MOD 30 MIN: CPT | Mod: PBBFAC | Performed by: OBSTETRICS & GYNECOLOGY

## 2024-10-24 PROCEDURE — 99999 PR PBB SHADOW E&M-EST. PATIENT-LVL IV: CPT | Mod: PBBFAC,,, | Performed by: OBSTETRICS & GYNECOLOGY

## 2024-10-24 PROCEDURE — 87660 TRICHOMONAS VAGIN DIR PROBE: CPT | Mod: ,,, | Performed by: CLINICAL MEDICAL LABORATORY

## 2024-10-24 PROCEDURE — 4010F ACE/ARB THERAPY RXD/TAKEN: CPT | Mod: CPTII,,, | Performed by: OBSTETRICS & GYNECOLOGY

## 2024-10-24 PROCEDURE — 1160F RVW MEDS BY RX/DR IN RCRD: CPT | Mod: CPTII,,, | Performed by: OBSTETRICS & GYNECOLOGY

## 2024-10-24 PROCEDURE — 1159F MED LIST DOCD IN RCRD: CPT | Mod: CPTII,,, | Performed by: OBSTETRICS & GYNECOLOGY

## 2024-10-24 PROCEDURE — 3008F BODY MASS INDEX DOCD: CPT | Mod: CPTII,,, | Performed by: OBSTETRICS & GYNECOLOGY

## 2024-10-24 PROCEDURE — 3079F DIAST BP 80-89 MM HG: CPT | Mod: CPTII,,, | Performed by: OBSTETRICS & GYNECOLOGY

## 2024-10-24 PROCEDURE — 87510 GARDNER VAG DNA DIR PROBE: CPT | Mod: ,,, | Performed by: CLINICAL MEDICAL LABORATORY

## 2024-10-24 PROCEDURE — 3077F SYST BP >= 140 MM HG: CPT | Mod: CPTII,,, | Performed by: OBSTETRICS & GYNECOLOGY

## 2024-10-24 PROCEDURE — 87480 CANDIDA DNA DIR PROBE: CPT | Mod: ,,, | Performed by: CLINICAL MEDICAL LABORATORY

## 2024-10-24 PROCEDURE — 99213 OFFICE O/P EST LOW 20 MIN: CPT | Mod: S$PBB,,, | Performed by: OBSTETRICS & GYNECOLOGY

## 2024-10-24 PROCEDURE — 3044F HG A1C LEVEL LT 7.0%: CPT | Mod: CPTII,,, | Performed by: OBSTETRICS & GYNECOLOGY

## 2024-10-24 RX ORDER — FLUCONAZOLE 150 MG/1
150 TABLET ORAL
Qty: 2 TABLET | Refills: 0 | Status: SHIPPED | OUTPATIENT
Start: 2024-10-24 | End: 2024-10-28

## 2024-10-24 NOTE — PROGRESS NOTES
PESSARY CHECK:    Dari Valverde is a 69 y.o. female   presents for a pessary check and has no complaints of incontinence, bleeding, discharge.     However, she has decided to proceed with referral to UroGyn and no longer desires the pessary.    PRE-PESSARY CHECK COUNSELING:  The patient was informed of the risks of pain or discomfort, continuous incontinence, urinary retention with insertion of a pessary and malodorous discharge and/or possible bleeding from granulation tissue after wearing the pessary for sometime. She was counseled on the alternatives to pessary insertion, including surgery or no treatment with continued symptoms, and she agrees to proceed.    PROCEDURE:  A time out was performed.  The pessary was removed and cleaned with soap and water.  There was not granulation tissue present.  There was discharge present.  The pessary was not re-inserted and disposed.   The patient was able to sit, stand, walk and either cough or urinate on the toilet after the procedure normally..  The patient tolerated the procedure well.    ASSESSMENT:  Pelvic relaxation no longer managed with pessary. Patient referred to UroGyn.    She cannot easily remove and reinsert the pessary and she is sexually active. Therefore, she desires surgical management so she can remain sexually active with her .      PLAN:    LABS AND TESTS ORDERED:      MEDICATIONS PRESCRIBED:    POST PESSARY CHECK COUNSELING:  Report worsening urinary incontinence, urinary retention, pain, fever, foul smelling discharge or bleeding.      FOLLOW-UP with UroGYN. Referral sent.    Cammy Martinez MD     Answers submitted by the patient for this visit:  Gynecologic Exam Questionnaire  (Submitted on 10/23/2024)  Chief Complaint: Gynecologic exam  genital itching: No  genital lesions: No  genital odor: No  genital rash: No  missed menses: No  vaginal bleeding: No  vaginal discharge: No  Pregnant now?: No  abdominal pain: No  back pain:  No  chills: No  constipation: No  diarrhea: No  discolored urine: No  dysuria: No  fever: No  flank pain: No  frequency: No  headaches: No  hematuria: No  nausea: No  painful intercourse: No  rash: No  urgency: No  vomiting: No  Passing clots?: No  Sexual activity: sexually active  Partner with STD symptoms: no  Menstrual history: postmenopausal   section: Yes

## 2024-10-25 ENCOUNTER — TELEPHONE (OUTPATIENT)
Dept: OBSTETRICS AND GYNECOLOGY | Facility: CLINIC | Age: 69
End: 2024-10-25
Payer: COMMERCIAL

## 2024-10-25 NOTE — TELEPHONE ENCOUNTER
----- Message from Cammy Martinez MD sent at 10/24/2024  5:43 PM CDT -----  Please call her and tell her that she has a yeast infection. I'm sending Fluconazole to her pharmacy.

## 2024-12-23 RX ORDER — GABAPENTIN 300 MG/1
300 CAPSULE ORAL 3 TIMES DAILY
Qty: 270 CAPSULE | Refills: 3 | Status: SHIPPED | OUTPATIENT
Start: 2024-12-23

## 2024-12-23 NOTE — TELEPHONE ENCOUNTER
Called patient to clarify dose. We previously had her on gabapentin 300 mg, we received refill request for 100mg.          ---- Message from Tech Laturina sent at 12/23/2024 12:07 PM CST -----  Who Called: [facility] ([facility])    Refill or New Rx:Refill  RX Name and Strength:gabapentin (NEURONTIN) 100 MG capsule      How is the patient currently taking it? (ex. 1XDay):3xday  Is this a 30 day or 90 day RX:90  Local or Mail Order:[facility]      Ordering Provider: DR MCKNIGHT       Preferred Method of Contact: Phone Call  Patient's Preferred Phone Number on File: [phone]   Best Call Back Number, if different:  Additional Information: AURA FROM [facility] CALLED TO GET A MEDICATION REFILLED FOR A PATIENT THE CALL BACK NUMBER IS [phone] AND THE INVOICE NUMBER IS [phone]

## 2025-01-14 NOTE — PROGRESS NOTES
Clinic note     Patient name: Dari Valverde is a 69 y.o. female   Chief compliant   Chief Complaint   Patient presents with    Medication Refill     Here for med refills. No problems voiced at this time. Pt states she did a stool colon screening dec 2024 and got a letter stating it was positive. She is ok with doing a c-scope but wants to wait till she recovers from her recent hysterectomy.        Subjective     History of present illness   In clinic for routine follow up and medication refills   She states she obtained stool for colon cancer screening in December and received a letter stating this screening was positive; copy placed in media      Past Medical History: HTN, Type 2 DM, HLD     Last A1c 6.1, this will be rechecked today   Checking blood glucose: BID and prn   Blood glucose log not in clinic      Spine clinic: Dr BEN Martinez   Neurology: Dr Ling, clinic note reviewed   GYN: Dr Martinez   Also followed by [facility], Dr Colindres      Colon cancer screening due: pt reports positive results in December and wants to schedule colonoscopy after follow up appointment for hysterectomy which is scheduled on  with Dr Colindres; referral will be placed today   Eye exam due: encouraged to schedule diabetic eye exam with provider of her choice, contact information for [facility] provided today in clinic   Mammogram scheduled for 2025              Social History     Tobacco Use    Smoking status: Never     Passive exposure: Never    Smokeless tobacco: Never   Substance Use Topics    Alcohol use: Never    Drug use: Never       Review of patient's allergies indicates:   Allergen Reactions    Lisinopril      Causes cough        Past Medical History:   Diagnosis Date    Diabetes     Hyperlipemia     Hypertension        Past Surgical History:   Procedure Laterality Date     SECTION      CHOLECYSTECTOMY      HYSTERECTOMY  2024    Surgery was done in [place], [place]    OOPHORECTOMY Bilateral  12/2024    TUBAL LIGATION  1982        Family History   Problem Relation Name Age of Onset    Stroke Maternal Grandmother Na Hoover     Heart disease Father Hi Alcaraz Mother      Breast cancer Neg Hx      Colon cancer Neg Hx      Ovarian cancer Neg Hx           Current Outpatient Medications:     estradioL (ESTRACE) 0.01 % (0.1 mg/gram) vaginal cream, Place 1 g vaginally twice a week., Disp: 42.5 g, Rfl: 2    gabapentin (NEURONTIN) 300 MG capsule, Take 1 capsule (300 mg total) by mouth 3 (three) times daily., Disp: 270 capsule, Rfl: 3    amLODIPine (NORVASC) 10 MG tablet, Take 1 tablet (10 mg total) by mouth once daily., Disp: 90 tablet, Rfl: 0    atorvastatin (LIPITOR) 40 MG tablet, Take 1 tablet (40 mg total) by mouth once daily., Disp: 90 tablet, Rfl: 0    blood sugar diagnostic Strp, One touch verio flex Use one test strip to check blood glucose BID, Disp: 180 strip, Rfl: 3    blood-glucose meter (ONETOUCH VERIO FLEX START) kit, Use as directed to check blood glucose, Disp: 1 each, Rfl: 0    lancets (LANCETS,THIN) Misc, Use one lancet to check blood glucose BID, Disp: 200 each, Rfl: 3    losartan (COZAAR) 50 MG tablet, Take 1 tablet (50 mg total) by mouth once daily., Disp: 90 tablet, Rfl: 0    metFORMIN (GLUCOPHAGE) 500 MG tablet, Take 1 tablet (500 mg total) by mouth 2 (two) times daily., Disp: 180 tablet, Rfl: 0    ONETOUCH DELICA PLUS LANCET 33 gauge Misc, Apply topically., Disp: , Rfl:     Review of Systems   Constitutional:  Negative for appetite change, chills, fatigue, fever and unexpected weight change.   Respiratory:  Negative for cough and shortness of breath.    Cardiovascular:  Negative for chest pain, palpitations and leg swelling.   Gastrointestinal:  Negative for abdominal pain, change in bowel habit, constipation, diarrhea, nausea and vomiting.   Genitourinary:  Negative for dysuria and frequency.   Musculoskeletal:  Negative for arthralgias, gait problem and myalgias.  "  Neurological:  Negative for dizziness, syncope, light-headedness and headaches.   Psychiatric/Behavioral:  Negative for dysphoric mood and sleep disturbance. The patient is not nervous/anxious.        Objective     BP (!) 166/75 (Patient Position: Sitting)   Pulse 81   Ht 5' 2" (1.575 m)   Wt 73.9 kg (163 lb)   SpO2 99%   BMI 29.81 kg/m²     Physical Exam   Constitutional: She is oriented to person, place, and time. No distress.   HENT:   Head: Atraumatic.   Mouth/Throat: Mucous membranes are moist.   Cardiovascular: Normal rate and regular rhythm. Pulmonary:      Effort: Pulmonary effort is normal. No respiratory distress.      Breath sounds: Normal breath sounds. No wheezing, rhonchi or rales.     Abdominal: Soft. Bowel sounds are normal. She exhibits no distension. There is no abdominal tenderness.   Musculoskeletal:         General: Normal range of motion.      Cervical back: Neck supple.      Right lower leg: No edema.      Left lower leg: No edema.   Neurological: She is alert and oriented to person, place, and time. Gait normal.   Skin: Skin is warm and dry.   Psychiatric: Her behavior is normal. Mood normal.       Lab Results   Component Value Date    WBC 6.58 01/08/2024    HGB 11.1 (L) 01/08/2024    HCT 34.3 (L) 01/08/2024    MCV 86.2 01/08/2024     (H) 01/08/2024       CMP  Sodium   Date Value Ref Range Status   07/08/2024 139 136 - 145 mmol/L Final     Potassium   Date Value Ref Range Status   07/08/2024 3.9 3.5 - 5.1 mmol/L Final     Chloride   Date Value Ref Range Status   07/08/2024 110 (H) 98 - 107 mmol/L Final     CO2   Date Value Ref Range Status   07/08/2024 22 21 - 32 mmol/L Final     Glucose   Date Value Ref Range Status   07/08/2024 110 (H) 74 - 106 mg/dL Final     BUN   Date Value Ref Range Status   07/08/2024 10 7 - 18 mg/dL Final     Creatinine   Date Value Ref Range Status   07/08/2024 0.75 0.55 - 1.02 mg/dL Final     Calcium   Date Value Ref Range Status   07/08/2024 9.3 8.5 - " 10.1 mg/dL Final     Total Protein   Date Value Ref Range Status   07/08/2024 7.6 6.4 - 8.2 g/dL Final     Albumin   Date Value Ref Range Status   07/08/2024 3.7 3.5 - 5.0 g/dL Final     Bilirubin, Total   Date Value Ref Range Status   07/08/2024 0.3 >0.0 - 1.2 mg/dL Final     Alk Phos   Date Value Ref Range Status   07/08/2024 82 55 - 142 U/L Final     AST   Date Value Ref Range Status   07/08/2024 13 (L) 15 - 37 U/L Final     ALT   Date Value Ref Range Status   07/08/2024 25 13 - 56 U/L Final     Anion Gap   Date Value Ref Range Status   07/08/2024 11 7 - 16 mmol/L Final     eGFR   Date Value Ref Range Status   04/05/2022 80 >=60 mL/min/1.73m² Final     Lab Results   Component Value Date    TSH 1.510 06/21/2023     Lab Results   Component Value Date    CHOL 157 07/08/2024    CHOL 206 (H) 06/21/2023    CHOL 139 04/05/2022     Lab Results   Component Value Date    HDL 66 (H) 07/08/2024    HDL 58 06/21/2023    HDL 70 (H) 04/05/2022     Lab Results   Component Value Date    LDLCALC 72 07/08/2024    LDLCALC 103 06/21/2023    LDLCALC 44 04/05/2022     Lab Results   Component Value Date    TRIG 97 07/08/2024    TRIG 224 (H) 06/21/2023    TRIG 126 04/05/2022     Lab Results   Component Value Date    CHOLHDL 2.4 07/08/2024    CHOLHDL 3.6 06/21/2023    CHOLHDL 2.0 04/05/2022     Lab Results   Component Value Date    HGBA1C 6.1 07/08/2024         Assessment and Plan   Type 2 diabetes mellitus without complication, without long-term current use of insulin  -     metFORMIN (GLUCOPHAGE) 500 MG tablet; Take 1 tablet (500 mg total) by mouth 2 (two) times daily.  Dispense: 180 tablet; Refill: 0  -     Hemoglobin A1C; Future; Expected date: 01/15/2025  -     Microalbumin/Creatinine Ratio, Urine; Future; Expected date: 01/15/2025    Essential hypertension  -     amLODIPine (NORVASC) 10 MG tablet; Take 1 tablet (10 mg total) by mouth once daily.  Dispense: 90 tablet; Refill: 0  -     CBC Auto Differential; Future; Expected date:  01/15/2025  -     Comprehensive Metabolic Panel; Future; Expected date: 01/15/2025  -     losartan (COZAAR) 50 MG tablet; Take 1 tablet (50 mg total) by mouth once daily.  Dispense: 90 tablet; Refill: 0    Hyperlipidemia, unspecified hyperlipidemia type  -     atorvastatin (LIPITOR) 40 MG tablet; Take 1 tablet (40 mg total) by mouth once daily.  Dispense: 90 tablet; Refill: 0    Overweight with body mass index (BMI) of 29 to 29.9 in adult    Positive fecal immunochemical test  -     Colonoscopy; Future; Expected date: 01/15/2025    Screening for malignant neoplasm of colon  -     Colonoscopy; Future; Expected date: 01/15/2025          Patient Instructions  Patient Instructions   Increase losartan to 50 mg daily for blood pressure   Check blood pressure in the am and again in the afternoon, keep written log and bring in to clinic  in two weeks for me to review and place in your health record     Advised to monitor BP at home and keep log, bring log in to all clinic visits .  Advised on optimal BP readings - SBP < 130 & DBP < 80.   Recommended DASH diet, stay well hydrated with water daily, eliminate or decrease caffeinated and high calorie drinks, increase physical activity, and lose weight if BMI > 25.0.    Routine medication will be due again on 4/14/2025  Lab obtained in clinic today, we will notify you of results and any necessary changes to plan of care     Referral placed for colonoscopy     Know your numbers provided at clinic discharge

## 2025-01-15 ENCOUNTER — OFFICE VISIT (OUTPATIENT)
Dept: FAMILY MEDICINE | Facility: CLINIC | Age: 70
End: 2025-01-15
Payer: COMMERCIAL

## 2025-01-15 VITALS
BODY MASS INDEX: 30 KG/M2 | HEIGHT: 62 IN | OXYGEN SATURATION: 99 % | HEART RATE: 81 BPM | DIASTOLIC BLOOD PRESSURE: 75 MMHG | SYSTOLIC BLOOD PRESSURE: 166 MMHG | WEIGHT: 163 LBS

## 2025-01-15 DIAGNOSIS — E11.9 TYPE 2 DIABETES MELLITUS WITHOUT COMPLICATION, WITHOUT LONG-TERM CURRENT USE OF INSULIN: Primary | Chronic | ICD-10-CM

## 2025-01-15 DIAGNOSIS — E66.3 OVERWEIGHT WITH BODY MASS INDEX (BMI) OF 29 TO 29.9 IN ADULT: ICD-10-CM

## 2025-01-15 DIAGNOSIS — Z12.11 SCREENING FOR MALIGNANT NEOPLASM OF COLON: ICD-10-CM

## 2025-01-15 DIAGNOSIS — I10 ESSENTIAL HYPERTENSION: ICD-10-CM

## 2025-01-15 DIAGNOSIS — R19.5 POSITIVE FECAL IMMUNOCHEMICAL TEST: ICD-10-CM

## 2025-01-15 DIAGNOSIS — E78.5 HYPERLIPIDEMIA, UNSPECIFIED HYPERLIPIDEMIA TYPE: Chronic | ICD-10-CM

## 2025-01-15 LAB
ALBUMIN SERPL BCP-MCNC: 4.1 G/DL (ref 3.4–4.8)
ALBUMIN/GLOB SERPL: 1.1 {RATIO}
ALP SERPL-CCNC: 79 U/L (ref 40–150)
ALT SERPL W P-5'-P-CCNC: 22 U/L
ANION GAP SERPL CALCULATED.3IONS-SCNC: 12 MMOL/L (ref 7–16)
AST SERPL W P-5'-P-CCNC: 23 U/L (ref 5–34)
BASOPHILS # BLD AUTO: 0.03 K/UL (ref 0–0.2)
BASOPHILS NFR BLD AUTO: 0.5 % (ref 0–1)
BILIRUB SERPL-MCNC: 0.3 MG/DL
BUN SERPL-MCNC: 6 MG/DL (ref 10–20)
BUN/CREAT SERPL: 8 (ref 6–20)
CALCIUM SERPL-MCNC: 9.6 MG/DL (ref 8.4–10.2)
CHLORIDE SERPL-SCNC: 106 MMOL/L (ref 98–107)
CO2 SERPL-SCNC: 26 MMOL/L (ref 23–31)
CREAT SERPL-MCNC: 0.71 MG/DL (ref 0.55–1.02)
CREAT UR-MCNC: 57 MG/DL (ref 15–325)
DIFFERENTIAL METHOD BLD: ABNORMAL
EGFR (NO RACE VARIABLE) (RUSH/TITUS): 91 ML/MIN/1.73M2
EOSINOPHIL # BLD AUTO: 0.04 K/UL (ref 0–0.5)
EOSINOPHIL NFR BLD AUTO: 0.6 % (ref 1–4)
ERYTHROCYTE [DISTWIDTH] IN BLOOD BY AUTOMATED COUNT: 12.7 % (ref 11.5–14.5)
EST. AVERAGE GLUCOSE BLD GHB EST-MCNC: 137 MG/DL
GLOBULIN SER-MCNC: 3.7 G/DL (ref 2–4)
GLUCOSE SERPL-MCNC: 115 MG/DL (ref 82–115)
HBA1C MFR BLD HPLC: 6.4 %
HCT VFR BLD AUTO: 36.3 % (ref 38–47)
HGB BLD-MCNC: 11.9 G/DL (ref 12–16)
IMM GRANULOCYTES # BLD AUTO: 0.02 K/UL (ref 0–0.04)
IMM GRANULOCYTES NFR BLD: 0.3 % (ref 0–0.4)
LYMPHOCYTES # BLD AUTO: 2.28 K/UL (ref 1–4.8)
LYMPHOCYTES NFR BLD AUTO: 36.8 % (ref 27–41)
MCH RBC QN AUTO: 28.2 PG (ref 27–31)
MCHC RBC AUTO-ENTMCNC: 32.8 G/DL (ref 32–36)
MCV RBC AUTO: 86 FL (ref 80–96)
MICROALBUMIN UR-MCNC: 0.8 MG/DL
MICROALBUMIN/CREAT RATIO PNL UR: 14 MG/G (ref 0–30)
MONOCYTES # BLD AUTO: 0.36 K/UL (ref 0–0.8)
MONOCYTES NFR BLD AUTO: 5.8 % (ref 2–6)
MPC BLD CALC-MCNC: 10.4 FL (ref 9.4–12.4)
NEUTROPHILS # BLD AUTO: 3.47 K/UL (ref 1.8–7.7)
NEUTROPHILS NFR BLD AUTO: 56 % (ref 53–65)
NRBC # BLD AUTO: 0 X10E3/UL
NRBC, AUTO (.00): 0 %
PLATELET # BLD AUTO: 357 K/UL (ref 150–400)
POTASSIUM SERPL-SCNC: 3.7 MMOL/L (ref 3.5–5.1)
PROT SERPL-MCNC: 7.8 G/DL (ref 5.8–7.6)
RBC # BLD AUTO: 4.22 M/UL (ref 4.2–5.4)
SODIUM SERPL-SCNC: 140 MMOL/L (ref 136–145)
WBC # BLD AUTO: 6.2 K/UL (ref 4.5–11)

## 2025-01-15 PROCEDURE — 3008F BODY MASS INDEX DOCD: CPT | Mod: ,,, | Performed by: NURSE PRACTITIONER

## 2025-01-15 PROCEDURE — 83036 HEMOGLOBIN GLYCOSYLATED A1C: CPT | Mod: ,,, | Performed by: CLINICAL MEDICAL LABORATORY

## 2025-01-15 PROCEDURE — 1160F RVW MEDS BY RX/DR IN RCRD: CPT | Mod: ,,, | Performed by: NURSE PRACTITIONER

## 2025-01-15 PROCEDURE — 80053 COMPREHEN METABOLIC PANEL: CPT | Mod: ,,, | Performed by: CLINICAL MEDICAL LABORATORY

## 2025-01-15 PROCEDURE — 99214 OFFICE O/P EST MOD 30 MIN: CPT | Mod: ,,, | Performed by: NURSE PRACTITIONER

## 2025-01-15 PROCEDURE — 1159F MED LIST DOCD IN RCRD: CPT | Mod: ,,, | Performed by: NURSE PRACTITIONER

## 2025-01-15 PROCEDURE — 82043 UR ALBUMIN QUANTITATIVE: CPT | Mod: ,,, | Performed by: CLINICAL MEDICAL LABORATORY

## 2025-01-15 PROCEDURE — 1101F PT FALLS ASSESS-DOCD LE1/YR: CPT | Mod: ,,, | Performed by: NURSE PRACTITIONER

## 2025-01-15 PROCEDURE — 3078F DIAST BP <80 MM HG: CPT | Mod: ,,, | Performed by: NURSE PRACTITIONER

## 2025-01-15 PROCEDURE — 3077F SYST BP >= 140 MM HG: CPT | Mod: ,,, | Performed by: NURSE PRACTITIONER

## 2025-01-15 PROCEDURE — 4010F ACE/ARB THERAPY RXD/TAKEN: CPT | Mod: ,,, | Performed by: NURSE PRACTITIONER

## 2025-01-15 PROCEDURE — 3288F FALL RISK ASSESSMENT DOCD: CPT | Mod: ,,, | Performed by: NURSE PRACTITIONER

## 2025-01-15 PROCEDURE — 85025 COMPLETE CBC W/AUTO DIFF WBC: CPT | Mod: ,,, | Performed by: CLINICAL MEDICAL LABORATORY

## 2025-01-15 PROCEDURE — 1126F AMNT PAIN NOTED NONE PRSNT: CPT | Mod: ,,, | Performed by: NURSE PRACTITIONER

## 2025-01-15 PROCEDURE — 82570 ASSAY OF URINE CREATININE: CPT | Mod: ,,, | Performed by: CLINICAL MEDICAL LABORATORY

## 2025-01-15 RX ORDER — LOSARTAN POTASSIUM 25 MG/1
25 TABLET ORAL DAILY
Qty: 90 TABLET | Refills: 1 | Status: CANCELLED | OUTPATIENT
Start: 2025-01-15

## 2025-01-15 RX ORDER — AMLODIPINE BESYLATE 10 MG/1
10 TABLET ORAL DAILY
Qty: 90 TABLET | Refills: 0 | Status: SHIPPED | OUTPATIENT
Start: 2025-01-15

## 2025-01-15 RX ORDER — LOSARTAN POTASSIUM 50 MG/1
50 TABLET ORAL DAILY
Qty: 90 TABLET | Refills: 0 | Status: SHIPPED | OUTPATIENT
Start: 2025-01-15

## 2025-01-15 RX ORDER — METFORMIN HYDROCHLORIDE 500 MG/1
500 TABLET ORAL 2 TIMES DAILY
Qty: 180 TABLET | Refills: 0 | Status: SHIPPED | OUTPATIENT
Start: 2025-01-15

## 2025-01-15 RX ORDER — ATORVASTATIN CALCIUM 40 MG/1
40 TABLET, FILM COATED ORAL DAILY
Qty: 90 TABLET | Refills: 0 | Status: SHIPPED | OUTPATIENT
Start: 2025-01-15

## 2025-01-15 NOTE — PATIENT INSTRUCTIONS
Increase losartan to 50 mg daily for blood pressure   Check blood pressure in the am and again in the afternoon, keep written log and bring in to clinic  in two weeks for me to review and place in your health record     Advised to monitor BP at home and keep log, bring log in to all clinic visits .  Advised on optimal BP readings - SBP < 130 & DBP < 80.   Recommended DASH diet, stay well hydrated with water daily, eliminate or decrease caffeinated and high calorie drinks, increase physical activity, and lose weight if BMI > 25.0.    Routine medication will be due again on 4/14/2025  Lab obtained in clinic today, we will notify you of results and any necessary changes to plan of care     Referral placed for colonoscopy     Know your numbers provided at clinic discharge

## 2025-01-16 ENCOUNTER — PATIENT MESSAGE (OUTPATIENT)
Dept: FAMILY MEDICINE | Facility: CLINIC | Age: 70
End: 2025-01-16
Payer: COMMERCIAL

## 2025-01-30 ENCOUNTER — HOSPITAL ENCOUNTER (OUTPATIENT)
Dept: RADIOLOGY | Facility: HOSPITAL | Age: 70
Discharge: HOME | End: 2025-01-30
Attending: NURSE PRACTITIONER
Payer: COMMERCIAL

## 2025-01-30 VITALS — WEIGHT: 160 LBS | BODY MASS INDEX: 29.44 KG/M2 | HEIGHT: 62 IN

## 2025-01-30 DIAGNOSIS — Z12.31 VISIT FOR SCREENING MAMMOGRAM: ICD-10-CM

## 2025-01-30 PROCEDURE — 77067 SCR MAMMO BI INCL CAD: CPT | Mod: TC

## 2025-02-10 ENCOUNTER — OFFICE VISIT (OUTPATIENT)
Dept: GASTROENTEROLOGY | Facility: CLINIC | Age: 70
End: 2025-02-10
Payer: COMMERCIAL

## 2025-02-10 VITALS
HEIGHT: 62 IN | OXYGEN SATURATION: 97 % | WEIGHT: 163 LBS | SYSTOLIC BLOOD PRESSURE: 177 MMHG | BODY MASS INDEX: 30 KG/M2 | HEART RATE: 86 BPM | DIASTOLIC BLOOD PRESSURE: 79 MMHG

## 2025-02-10 DIAGNOSIS — R19.5 POSITIVE FIT (FECAL IMMUNOCHEMICAL TEST): Primary | ICD-10-CM

## 2025-02-10 PROCEDURE — 99214 OFFICE O/P EST MOD 30 MIN: CPT | Mod: PBBFAC

## 2025-02-10 PROCEDURE — 99999 PR PBB SHADOW E&M-EST. PATIENT-LVL IV: CPT | Mod: PBBFAC,,,

## 2025-02-10 RX ORDER — POLYETHYLENE GLYCOL 3350, SODIUM SULFATE ANHYDROUS, SODIUM BICARBONATE, SODIUM CHLORIDE, POTASSIUM CHLORIDE 236; 22.74; 6.74; 5.86; 2.97 G/4L; G/4L; G/4L; G/4L; G/4L
4 POWDER, FOR SOLUTION ORAL ONCE
Qty: 4000 ML | Refills: 0 | Status: SHIPPED | OUTPATIENT
Start: 2025-02-10 | End: 2025-02-10

## 2025-02-10 NOTE — PROGRESS NOTES
Gastroenterology Clinic Note    Patient ID: [id number]   Referring MD: No ref. provider found   Chief Complaint:   Chief Complaint   Patient presents with    Positive fit test       History of Present Illness   Dari Valverde is an 70 y.o. AAF who is referred for positive FIT.  Patient with recently positive FIT.  She denies any issues with abdominal pain, constipation, diarrhea, hematochezia or melena.  No unintentional weight loss reported.  No prior colonoscopy.  Her previous colon cancer screening was a negative Cologuard 3 years ago.  She denies family history of CRC.      Previous workup:FIT (+)       Review of Systems   Constitutional:  Negative for weight loss.   Gastrointestinal:  Negative for abdominal pain, blood in stool, constipation, diarrhea, heartburn, melena, nausea and vomiting.       Past Medical History      Past Medical History:   Diagnosis Date    Diabetes     Hyperlipemia     Hypertension        Past Surgical History     Past Surgical History:   Procedure Laterality Date     SECTION      CHOLECYSTECTOMY      HYSTERECTOMY  2024    [facility] [place]    OOPHORECTOMY Bilateral 2024    TUBAL LIGATION         Allergies     Review of patient's allergies indicates:   Allergen Reactions    Lisinopril      Causes cough        Immunization History     Immunization History   Administered Date(s) Administered    COVID-19 Vaccine 2023    COVID-19, MRNA, LN-S, PF (MODERNA FULL 0.5 ML DOSE) 2021, 2021, 10/26/2021, 2022    COVID-19, mRNA, LNP-S, PF (Moderna) Ages 12+ 2024    Influenza 2021    Influenza - Quadrivalent 2023    Influenza - Trivalent - Fluzone High Dose - PF (65 years and older) 10/21/2022, 2024       Past Family History      Family History   Problem Relation Name Age of Onset    Stroke Maternal Grandmother Na Hoover     Heart disease Father Hi Santos     Dementia Mother      Breast cancer Neg Hx      Colon cancer  Neg Hx      Ovarian cancer Neg Hx         Past Social History      Social History     Socioeconomic History    Marital status:    Tobacco Use    Smoking status: Never     Passive exposure: Never    Smokeless tobacco: Never   Substance and Sexual Activity    Alcohol use: Never    Drug use: Never    Sexual activity: Yes     Partners: Male     Birth control/protection: Post-menopausal     Social Drivers of Health     Financial Resource Strain: Low Risk  (1/14/2025)    Overall Financial Resource Strain (CARDIA)     Difficulty of Paying Living Expenses: Not hard at all   Food Insecurity: No Food Insecurity (1/14/2025)    Hunger Vital Sign     Worried About Running Out of Food in the Last Year: Never true     Ran Out of Food in the Last Year: Never true   Transportation Needs: No Transportation Needs (11/9/2023)    PRAPARE - Transportation     Lack of Transportation (Medical): No     Lack of Transportation (Non-Medical): No   Physical Activity: Insufficiently Active (1/14/2025)    Exercise Vital Sign     Days of Exercise per Week: 3 days     Minutes of Exercise per Session: 20 min   Stress: No Stress Concern Present (1/14/2025)    [employer] - Occupational Stress Questionnaire     Feeling of Stress : Not at all   Housing Stability: Low Risk  (11/9/2023)    Housing Stability Vital Sign     Unable to Pay for Housing in the Last Year: No     Number of Places Lived in the Last Year: 1     Unstable Housing in the Last Year: No       Current Medications     Outpatient Medications Marked as Taking for the 2/10/25 encounter (Office Visit) with Jaki Mclean FNP   Medication Sig Dispense Refill    amLODIPine (NORVASC) 10 MG tablet Take 1 tablet (10 mg total) by mouth once daily. 90 tablet 0    atorvastatin (LIPITOR) 40 MG tablet Take 1 tablet (40 mg total) by mouth once daily. 90 tablet 0    blood sugar diagnostic Strp One touch verio flex Use one test strip to check blood glucose   "strip 3    estradioL (ESTRACE) 0.01 % (0.1 mg/gram) vaginal cream Place 1 g vaginally twice a week. 42.5 g 2    gabapentin (NEURONTIN) 300 MG capsule Take 1 capsule (300 mg total) by mouth 3 (three) times daily. 270 capsule 3    lancets (LANCETS,THIN) Misc Use one lancet to check blood glucose  each 3    losartan (COZAAR) 50 MG tablet Take 1 tablet (50 mg total) by mouth once daily. 90 tablet 0    metFORMIN (GLUCOPHAGE) 500 MG tablet Take 1 tablet (500 mg total) by mouth 2 (two) times daily. 180 tablet 0    ONETOUCH DELICA PLUS LANCET 33 gauge Misc Apply topically.      polyethylene glycol (GOLYTELY) 236-22.74-6.74 -5.86 gram suspension Take 4,000 mLs (4 L total) by mouth once. for 1 dose 4000 mL 0        I have reviewed the current medications, allergies, vital signs, past medical and surgical history, family medical history, and social history for this encounter and agree with all findings.    OBJECTIVE    Physical Exam    BP (!) 177/79 (BP Location: Left arm, Patient Position: Sitting)   Pulse 86   Ht 5' 2" (1.575 m)   Wt 73.9 kg (163 lb)   SpO2 97%   BMI 29.81 kg/m²   GEN: Well appearing, cooperative, NAD  NECK: Supple, no LAD  CV: Normal rate  RESP: Unlabored  ABD: ND, NT, soft, no guarding  EXT: No clubbing, cyanosis, or edema  SKIN: Warm and dry  NEURO: AAO x4.     LABS    CBC (with or without Differential):   Lab Results   Component Value Date    WBC 6.20 01/15/2025    HGB 11.9 (L) 01/15/2025    HCT 36.3 (L) 01/15/2025    MCV 86.0 01/15/2025    MCH 28.2 01/15/2025    MCHC 32.8 01/15/2025    RDW 12.7 01/15/2025     01/15/2025    MPV 10.4 01/15/2025    NEUTOPHILPCT 56.0 01/15/2025    DIFFTYPE Auto 01/15/2025     BMP/CMP:   Lab Results   Component Value Date     01/15/2025    K 3.7 01/15/2025     01/15/2025    CO2 26 01/15/2025    BUN 6 (L) 01/15/2025    CREATININE 0.71 01/15/2025     01/15/2025    CALCIUM 9.6 01/15/2025    ALBUMIN 4.1 01/15/2025    AST 23 01/15/2025    ALT " 22 01/15/2025    ALKPHOS 79 01/15/2025        IMAGING  No pertinent imaging available.    ASSESSMENT  Dari Valverde is a 70 y.o. AAF with history of type 2 diabetes, hypertension, and hyperlipidemia who is referred for positive FIT.    1. Positive FIT (fecal immunochemical test)           PLAN    - schedule colonoscopy for positive FIT; rule out malignancy  - return to GI clinic for follow-up as needed    There are no Patient Instructions on file for this visit.      No orders of the defined types were placed in this encounter.        The risks and benefits of my recommendations, as well as other treatment options were discussed with the patient today. All questions were answered.    35 minutes of total time spent on the encounter, which includes face to face time and non-face to face time preparing to see the patient (eg, review of tests), obtaining and/or reviewing separately obtained history, documenting clinical information in the electronic or other health record, Independently interpreting results (not separately reported) and communicating results to the patient/family/caregiver, or care coordination (not separately reported).     Jaki Mclean, MARCIAP/ACNP  [facility]

## 2025-03-03 ENCOUNTER — PATIENT OUTREACH (OUTPATIENT)
Facility: HOSPITAL | Age: 70
End: 2025-03-03
Payer: COMMERCIAL

## 2025-03-03 VITALS — SYSTOLIC BLOOD PRESSURE: 136 MMHG | DIASTOLIC BLOOD PRESSURE: 75 MMHG

## 2025-03-03 NOTE — PROGRESS NOTES
Population Health Chart Review & Patient Outreach Details    Updates Requested / Reviewed:  []  Care Team Updated  []  Care Everywhere Updated & Reviewed  []  [facility] Reviewed  [x]   Reviewed  []  MIIX Reviewed    Health Maintenance Topics Addressed and Outreach Outcomes / Actions Taken:       Blood Pressure Control []  Primary Care Follow Up Visit Scheduled     []  Remote Blood Pressure Reading Captured    [x]  Patient Reported a remote blood pressure today 3/3/2025 of 136/72    []  Patient Will Call Back or Send Portal Message with Reading

## 2025-03-20 ENCOUNTER — ANESTHESIA (OUTPATIENT)
Dept: GASTROENTEROLOGY | Facility: HOSPITAL | Age: 70
End: 2025-03-20
Payer: COMMERCIAL

## 2025-03-20 ENCOUNTER — HOSPITAL ENCOUNTER (OUTPATIENT)
Dept: GASTROENTEROLOGY | Facility: HOSPITAL | Age: 70
Discharge: HOME | End: 2025-03-20
Admitting: INTERNAL MEDICINE
Payer: COMMERCIAL

## 2025-03-20 ENCOUNTER — ANESTHESIA EVENT (OUTPATIENT)
Dept: GASTROENTEROLOGY | Facility: HOSPITAL | Age: 70
End: 2025-03-20
Payer: COMMERCIAL

## 2025-03-20 VITALS
OXYGEN SATURATION: 99 % | TEMPERATURE: 98 F | BODY MASS INDEX: 29.81 KG/M2 | HEART RATE: 77 BPM | RESPIRATION RATE: 20 BRPM | HEIGHT: 62 IN | SYSTOLIC BLOOD PRESSURE: 135 MMHG | WEIGHT: 162 LBS | DIASTOLIC BLOOD PRESSURE: 67 MMHG

## 2025-03-20 DIAGNOSIS — R19.5 POSITIVE FIT (FECAL IMMUNOCHEMICAL TEST): ICD-10-CM

## 2025-03-20 LAB — GLUCOSE SERPL-MCNC: 107 MG/DL (ref 70–105)

## 2025-03-20 PROCEDURE — 37000009 HC ANESTHESIA EA ADD 15 MINS

## 2025-03-20 PROCEDURE — 25000003 PHARM REV CODE 250: Performed by: NURSE ANESTHETIST, CERTIFIED REGISTERED

## 2025-03-20 PROCEDURE — 27201423 OPTIME MED/SURG SUP & DEVICES STERILE SUPPLY

## 2025-03-20 PROCEDURE — 63600175 PHARM REV CODE 636 W HCPCS: Performed by: NURSE ANESTHETIST, CERTIFIED REGISTERED

## 2025-03-20 PROCEDURE — 27000284 HC CANNULA NASAL: Performed by: NURSE ANESTHETIST, CERTIFIED REGISTERED

## 2025-03-20 PROCEDURE — 88305 TISSUE EXAM BY PATHOLOGIST: CPT | Mod: TC,91,SUR | Performed by: INTERNAL MEDICINE

## 2025-03-20 PROCEDURE — 37000008 HC ANESTHESIA 1ST 15 MINUTES

## 2025-03-20 PROCEDURE — D9220A PRA ANESTHESIA: Mod: PT,,, | Performed by: NURSE ANESTHETIST, CERTIFIED REGISTERED

## 2025-03-20 RX ORDER — PROPOFOL 10 MG/ML
VIAL (ML) INTRAVENOUS
Status: DISCONTINUED | OUTPATIENT
Start: 2025-03-20 | End: 2025-03-20

## 2025-03-20 RX ORDER — SODIUM CHLORIDE 9 MG/ML
INJECTION, SOLUTION INTRAVENOUS CONTINUOUS PRN
Status: DISCONTINUED | OUTPATIENT
Start: 2025-03-20 | End: 2025-03-20

## 2025-03-20 RX ORDER — LIDOCAINE HYDROCHLORIDE 20 MG/ML
INJECTION, SOLUTION EPIDURAL; INFILTRATION; INTRACAUDAL; PERINEURAL
Status: DISCONTINUED | OUTPATIENT
Start: 2025-03-20 | End: 2025-03-20

## 2025-03-20 RX ORDER — SODIUM CHLORIDE 0.9 % (FLUSH) 0.9 %
10 SYRINGE (ML) INJECTION
Status: DISCONTINUED | OUTPATIENT
Start: 2025-03-20 | End: 2025-03-21 | Stop reason: HOSPADM

## 2025-03-20 RX ADMIN — PROPOFOL 50 MG: 10 INJECTION, EMULSION INTRAVENOUS at 11:03

## 2025-03-20 RX ADMIN — SODIUM CHLORIDE: 9 INJECTION, SOLUTION INTRAVENOUS at 11:03

## 2025-03-20 RX ADMIN — LIDOCAINE HYDROCHLORIDE 50 MG: 20 INJECTION, SOLUTION EPIDURAL; INFILTRATION; INTRACAUDAL; PERINEURAL at 11:03

## 2025-03-20 NOTE — DISCHARGE INSTRUCTIONS
Procedure Date  3/20/25     Impression  Overall Impression:   3 subcentimeter polyps removed with forceps and cold snare polypectomy   Extensive diverticulosis of severe severity  The ileocecal valve, appendiceal orifice and cecum appeared normal.  (grade 2) hemorrhoids     Recommendation  Await pathology results  Start a daily fiber supplement with Citrucel or Fibercon (can purchase at your local pharmacy)  Use Miralax 17 grams daily-to-twice daily as needed to have a regular, soft BM without straining  Drink at least 60-80 ounces of water daily unless you have a medical condition that requires fluid restriction  FIT likely a false positive or secondary to hemorrhoids    Repeat colonoscopy in 5 years      Outcome of procedure: successful Colonoscopy  Disposition: patient to recovery following procedure; discharge to home when appropriate parameters met  Provisions for follow up: please call my office for any unexpected symptoms like chest or abdominal pain or bleeding following your procedure.     Indication  Positive FIT (fecal immunochemical test

## 2025-03-20 NOTE — ANESTHESIA PREPROCEDURE EVALUATION
03/20/2025  Dari Valverde is a 70 y.o., female.      Pre-op Assessment    I have reviewed the Patient Summary Reports.     I have reviewed the Nursing Notes. I have reviewed the NPO Status.   I have reviewed the Medications.     Review of Systems  Anesthesia Hx:  No problems with previous Anesthesia                Social:  Non-Smoker, No Alcohol Use       Cardiovascular:     Hypertension           hyperlipidemia                               Neurological:    Neuromuscular Disease,                                   Endocrine:  Diabetes               Physical Exam  General: Well nourished, Cooperative, Alert and Oriented    Airway:  Mallampati: II   Mouth Opening: Normal  TM Distance: Normal  Tongue: Normal  Neck ROM: Normal ROM    Dental:  Intact    Chest/Lungs:  Clear to auscultation, Normal Respiratory Rate    Heart:  Rate: Normal  Rhythm: Regular Rhythm  Sounds: Normal    Abdomen:  Normal, Soft, Nontender    Advanced age    Anesthesia Plan  Type of Anesthesia, risks & benefits discussed:    Anesthesia Type: Gen Natural Airway, MAC  Intra-op Monitoring Plan: Standard ASA Monitors  Post Op Pain Control Plan: multimodal analgesia and IV/PO Opioids PRN  Induction:  IV  Informed Consent: Informed consent signed with the Patient and all parties understand the risks and agree with anesthesia plan.  All questions answered.   ASA Score: 3  Day of Surgery Review of History & Physical: I have interviewed and examined the patient. I have reviewed the patient's H&P dated:     Ready For Surgery From Anesthesia Perspective.     .

## 2025-03-20 NOTE — H&P
Gastroenterology Pre-procedure H&P    History of Present Illness    Dari Valverde is a 70 y.o. female that  has a past medical history of Diabetes, Hyperlipemia, and Hypertension.     Patient here for index colonoscopy after +FOBT    Patient denies wt loss, abdominal pain, diarrhea, melena/hematochezia, change in stool caliber, no anticoagulants, FMHx of GI related malignancies.      Past Medical History:   Diagnosis Date    Diabetes     Hyperlipemia     Hypertension        Past Surgical History:   Procedure Laterality Date     SECTION      CHOLECYSTECTOMY      HYSTERECTOMY  2024    [facility] [place]    OOPHORECTOMY Bilateral 2024    TUBAL LIGATION         Family History   Problem Relation Name Age of Onset    Stroke Maternal Grandmother Na Hoover     Heart disease Father Hi Santos     Dementia Mother      Breast cancer Neg Hx      Colon cancer Neg Hx      Ovarian cancer Neg Hx         Social History     Socioeconomic History    Marital status:    Tobacco Use    Smoking status: Never     Passive exposure: Never    Smokeless tobacco: Never   Substance and Sexual Activity    Alcohol use: Never    Drug use: Never    Sexual activity: Yes     Partners: Male     Birth control/protection: Post-menopausal     Social Drivers of Health     Financial Resource Strain: Low Risk  (2025)    Overall Financial Resource Strain (CARDIA)     Difficulty of Paying Living Expenses: Not hard at all   Food Insecurity: No Food Insecurity (2025)    Hunger Vital Sign     Worried About Running Out of Food in the Last Year: Never true     Ran Out of Food in the Last Year: Never true   Transportation Needs: No Transportation Needs (2023)    PRAPARE - Transportation     Lack of Transportation (Medical): No     Lack of Transportation (Non-Medical): No   Physical Activity: Insufficiently Active (2025)    Exercise Vital Sign     Days of Exercise per Week: 3 days     Minutes of Exercise  "per Session: 20 min   Stress: No Stress Concern Present (1/14/2025)    [employer] - Occupational Stress Questionnaire     Feeling of Stress : Not at all   Housing Stability: Low Risk  (11/9/2023)    Housing Stability Vital Sign     Unable to Pay for Housing in the Last Year: No     Number of Places Lived in the Last Year: 1     Unstable Housing in the Last Year: No       Current Medications[1]    Review of patient's allergies indicates:   Allergen Reactions    Lisinopril      Causes cough        Objective:  Vitals:    03/20/25 1056   BP: (!) 168/78   Pulse: 87   Resp: 16   Temp: 98.1 °F (36.7 °C)   TempSrc: Oral   SpO2: 97%   Weight: 73.5 kg (162 lb)   Height: 5' 2" (1.575 m)        GEN: normal appearing, NAD, AAO x3  HENT: NCAT, anicteric, OP benign  CV: normal rate, regular rhythm  RESP: NABS, symmetric rise, unlabored  ABD: soft, ND, no guarding or TTP  SKIN: warm and dry  NEURO: grossly afocal    Assessment and Plan:    Proceed with:    Colonoscopy for +FOBT    Polo Hoskins MD  Gastroenterology       [1]   Current Outpatient Medications   Medication Sig Dispense Refill    amLODIPine (NORVASC) 10 MG tablet Take 1 tablet (10 mg total) by mouth once daily. 90 tablet 0    atorvastatin (LIPITOR) 40 MG tablet Take 1 tablet (40 mg total) by mouth once daily. 90 tablet 0    blood sugar diagnostic Strp One touch verio flex Use one test strip to check blood glucose  strip 3    blood-glucose meter (ONETOUCH VERIO FLEX START) kit Use as directed to check blood glucose 1 each 0    estradioL (ESTRACE) 0.01 % (0.1 mg/gram) vaginal cream Place 1 g vaginally twice a week. 42.5 g 2    gabapentin (NEURONTIN) 300 MG capsule Take 1 capsule (300 mg total) by mouth 3 (three) times daily. 270 capsule 3    lancets (LANCETS,THIN) Misc Use one lancet to check blood glucose  each 3    losartan (COZAAR) 50 MG tablet Take 1 tablet (50 mg total) by mouth once daily. 90 tablet 0    metFORMIN " (GLUCOPHAGE) 500 MG tablet Take 1 tablet (500 mg total) by mouth 2 (two) times daily. 180 tablet 0    ONETOUCH DELICA PLUS LANCET 33 gauge Misc Apply topically.       No current facility-administered medications for this encounter.

## 2025-03-20 NOTE — TRANSFER OF CARE
"Anesthesia Transfer of Care Note    Patient: Dari Valverde    Procedure(s) Performed: * No procedures listed *    Patient location: GI    Anesthesia Type: general    Transport from OR: Transported from OR on room air with adequate spontaneous ventilation    Post pain: adequate analgesia    Post assessment: no apparent anesthetic complications    Post vital signs: stable    Level of consciousness: responds to stimulation    Nausea/Vomiting: no nausea/vomiting    Complications: none    Transfer of care protocol was followed      Last vitals: Visit Vitals  /67   Pulse 77   Temp 36.8 °C (98.2 °F) (Oral)   Resp 20   Ht 5' 2" (1.575 m)   Wt 73.5 kg (162 lb)   SpO2 99%   Breastfeeding No   BMI 29.63 kg/m²     " Patient is here today for a wound check on the right back. He had an excision done on 12/17/2020. Sutures were removed 12/31/2020; wound healing well. Patient states that the dressing always has discharge on it when his mother does the wound care. He is taking the Doxycycline as prescribed (100mg, 1 capsule PO BID). Wound had no bandage on it today. Mild erythema surrounding the wound, no swelling, no discharge present. Some tenderness noted upon palpation. Some yellow crusting located on wound. Wound was cleansed with normal saline. Photo taken of wound and placed in chart. Wound was cultured and submitted to the lab. Applied new Vaseline, non-adherent pad, and Hypafix over the wound. Advised to continue with the wound care as directed and the Doxycycline at this time. Patient verbalized understanding. Will forward message to MD for awareness.

## 2025-03-21 ENCOUNTER — RESULTS FOLLOW-UP (OUTPATIENT)
Dept: GASTROENTEROLOGY | Facility: HOSPITAL | Age: 70
End: 2025-03-21

## 2025-03-21 LAB
ESTROGEN SERPL-MCNC: NORMAL PG/ML
INSULIN SERPL-ACNC: NORMAL U[IU]/ML
LAB AP GROSS DESCRIPTION: NORMAL
LAB AP LABORATORY NOTES: NORMAL
T3RU NFR SERPL: NORMAL %

## 2025-03-21 NOTE — PROGRESS NOTES
Mrs. Cuellar, thank you for referring this patient to me. I recommend repeat colonoscopy in 5 years. Please let me know if you have any questions regarding this patient.

## 2025-03-24 NOTE — ANESTHESIA POSTPROCEDURE EVALUATION
Anesthesia Post Evaluation    Patient: Dari Valverde    Procedure(s) Performed: * No procedures listed *    Final Anesthesia Type: MAC      Patient location during evaluation: GI PACU  Patient participation: Yes- Able to Participate  Level of consciousness: awake and alert and oriented  Post-procedure vital signs: reviewed and stable  Pain management: adequate  Airway patency: patent    PONV status at discharge: No PONV  Anesthetic complications: no      Cardiovascular status: blood pressure returned to baseline  Respiratory status: unassisted, spontaneous ventilation and room air  Hydration status: euvolemic  Follow-up not needed.  Comments: See nursing note for post op pain/nael score              Vitals Value Taken Time   /73 03/20/25 12:25   Temp 36.8 °C (98.2 °F) 03/20/25 12:12   Pulse 75 03/20/25 12:26   Resp 11 03/20/25 12:26   SpO2 100 % 03/20/25 12:26   Vitals shown include unfiled device data.      Event Time   Out of Recovery 12:58:04         Pain/Nael Score: No data recorded

## 2025-04-17 NOTE — PROGRESS NOTES
Clinic note     Patient name: Dari Valverde is a 70 y.o. female   Chief compliant   Chief Complaint   Patient presents with    Medication Refill     Pt here for medication refills.    Health Maintenance     Foot Exam-done today  TETANUS VACCINE-declines today, info sheet given to pt  Pneumococcal Vaccine-declines today, info sheet given to pt  DEXA Scan-declined today  Shingles Vaccine-declines today, info sheet given to pt  RSV Vaccine-declines today, info sheet given to pt  Diabetic Eye Exam- Dr. WILIAM Redmond at [facility], records requested today.       Subjective     History of present illness   In clinic for routine follow up and medication refills   Request referral to podiatry for long, curved discolored bilateral great toenails   She has used multiple over the counter products with no improvement     Past Medical History: HTN, Type 2 DM, HLD     Last A1c 6.4, this will be rechecked today   Checking blood glucose: BID and prn   Blood glucose log not in clinic      Spine clinic: Dr BEN Martinez   Neurology: Dr Ling, clinic note reviewed   GYN: Dr Martinez   Also followed by [facility], Dr Colindres                   Social History[1]    Review of patient's allergies indicates:   Allergen Reactions    Lisinopril      Causes cough        Past Medical History:   Diagnosis Date    Diabetes     Hyperlipemia     Hypertension        Past Surgical History:   Procedure Laterality Date     SECTION      CHOLECYSTECTOMY      HYSTERECTOMY  2024    [facility] [place]    OOPHORECTOMY Bilateral 2024    TUBAL LIGATION          Family History   Problem Relation Name Age of Onset    Stroke Maternal Grandmother Na Hoover     Heart disease Father Hi Santos     Dementia Mother      Breast cancer Neg Hx      Colon cancer Neg Hx      Ovarian cancer Neg Hx         Current Medications[2]    Review of Systems   Constitutional:  Negative for appetite change, chills, fatigue, fever and  "unexpected weight change.   Respiratory:  Negative for cough and shortness of breath.    Cardiovascular:  Negative for chest pain, palpitations and leg swelling.   Gastrointestinal:  Negative for abdominal pain, change in bowel habit, constipation, diarrhea, nausea and vomiting.   Genitourinary:  Negative for dysuria and frequency.   Musculoskeletal:  Negative for arthralgias, gait problem and myalgias.   Neurological:  Negative for dizziness, syncope, light-headedness and headaches.   Psychiatric/Behavioral:  Negative for dysphoric mood and sleep disturbance. The patient is not nervous/anxious.        Objective     BP (!) 120/57 (Patient Position: Sitting)   Pulse 72   Resp 13   Ht 5' 2" (1.575 m)   Wt 73.6 kg (162 lb 4.8 oz)   SpO2 98%   BMI 29.69 kg/m²     Physical Exam   Constitutional: She is oriented to person, place, and time. No distress.   HENT:   Head: Atraumatic.   Mouth/Throat: Mucous membranes are moist.   Cardiovascular: Normal rate and regular rhythm.   Pulses:       Dorsalis pedis pulses are 2+ on the right side and 2+ on the left side.        Posterior tibial pulses are 2+ on the right side and 2+ on the left side. Pulmonary:      Effort: Pulmonary effort is normal. No respiratory distress.      Breath sounds: Normal breath sounds. No wheezing, rhonchi or rales.     Abdominal: Soft. Bowel sounds are normal. She exhibits no distension. There is no abdominal tenderness.   Musculoskeletal:         General: Normal range of motion.      Cervical back: Neck supple.      Right lower leg: No edema.      Left lower leg: No edema.   Feet:   Right Foot:   Protective Sensation: 10 sites tested.  10 sites sensed.   Left Foot:   Protective Sensation: 10 sites tested.  10 sites sensed.   Neurological: She is alert and oriented to person, place, and time. Gait normal.   Skin: Skin is warm and dry.   Psychiatric: Her behavior is normal. Mood normal.       Lab Results   Component Value Date    WBC 6.20 " 01/15/2025    HGB 11.9 (L) 01/15/2025    HCT 36.3 (L) 01/15/2025    MCV 86.0 01/15/2025     01/15/2025       CMP  Sodium   Date Value Ref Range Status   01/15/2025 140 136 - 145 mmol/L Final     Potassium   Date Value Ref Range Status   01/15/2025 3.7 3.5 - 5.1 mmol/L Final     Chloride   Date Value Ref Range Status   01/15/2025 106 98 - 107 mmol/L Final     CO2   Date Value Ref Range Status   01/15/2025 26 23 - 31 mmol/L Final     Glucose   Date Value Ref Range Status   01/15/2025 115 82 - 115 mg/dL Final     BUN   Date Value Ref Range Status   01/15/2025 6 (L) 10 - 20 mg/dL Final     Creatinine   Date Value Ref Range Status   01/15/2025 0.71 0.55 - 1.02 mg/dL Final     Calcium   Date Value Ref Range Status   01/15/2025 9.6 8.4 - 10.2 mg/dL Final     Total Protein   Date Value Ref Range Status   01/15/2025 7.8 (H) 5.8 - 7.6 g/dL Final     Albumin   Date Value Ref Range Status   01/15/2025 4.1 3.4 - 4.8 g/dL Final     Bilirubin, Total   Date Value Ref Range Status   01/15/2025 0.3 <=1.5 mg/dL Final     Alk Phos   Date Value Ref Range Status   01/15/2025 79 40 - 150 U/L Final     AST   Date Value Ref Range Status   01/15/2025 23 5 - 34 U/L Final     ALT   Date Value Ref Range Status   01/15/2025 22 <=55 U/L Final     Anion Gap   Date Value Ref Range Status   01/15/2025 12 7 - 16 mmol/L Final     eGFR   Date Value Ref Range Status   04/05/2022 80 >=60 mL/min/1.73m² Final     Lab Results   Component Value Date    TSH 1.510 06/21/2023     Lab Results   Component Value Date    CHOL 157 07/08/2024    CHOL 206 (H) 06/21/2023    CHOL 139 04/05/2022     Lab Results   Component Value Date    HDL 66 (H) 07/08/2024    HDL 58 06/21/2023    HDL 70 (H) 04/05/2022     Lab Results   Component Value Date    LDLCALC 72 07/08/2024    LDLCALC 103 06/21/2023    LDLCALC 44 04/05/2022     Lab Results   Component Value Date    TRIG 97 07/08/2024    TRIG 224 (H) 06/21/2023    TRIG 126 04/05/2022     Lab Results   Component Value Date  Quality 110: Preventive Care And Screening: Influenza Immunization: Influenza Immunization previously received during influenza season    CHOLHDL 2.4 07/08/2024    CHOLHDL 3.6 06/21/2023    CHOLHDL 2.0 04/05/2022     Lab Results   Component Value Date    HGBA1C 6.4 01/15/2025       Health Maintenance Due   Topic Date Due    TETANUS VACCINE  Never done    Pneumococcal Vaccines (Age 50+) (1 of 2 - PCV) Never done    DEXA Scan  Never done    Shingles Vaccine (1 of 2) Never done    RSV Vaccine (Age 60+ and Pregnant patients) (1 - Risk 60-74 years 1-dose series) Never done    Diabetic Eye Exam  10/05/2024         Health Maintenance Topics with due status: Not Due       Topic Last Completion Date    Lipid Panel 07/08/2024    Diabetes Urine Screening 01/15/2025    Hemoglobin A1c 01/15/2025    Mammogram 01/30/2025    Colorectal Cancer Screening 03/20/2025    Low Dose Statin 04/21/2025    Foot Exam 04/21/2025         Assessment and Plan   Type 2 diabetes mellitus without complication, without long-term current use of insulin  -     Hemoglobin A1C; Future; Expected date: 04/21/2025  -     metFORMIN (GLUCOPHAGE) 500 MG tablet; Take 1 tablet (500 mg total) by mouth 2 (two) times daily.  Dispense: 180 tablet; Refill: 0    Essential hypertension  -     losartan (COZAAR) 50 MG tablet; Take 1 tablet (50 mg total) by mouth once daily.  Dispense: 90 tablet; Refill: 0  -     amLODIPine (NORVASC) 10 MG tablet; Take 1 tablet (10 mg total) by mouth once daily.  Dispense: 90 tablet; Refill: 0    Hyperlipidemia, unspecified hyperlipidemia type  -     atorvastatin (LIPITOR) 40 MG tablet; Take 1 tablet (40 mg total) by mouth once daily.  Dispense: 90 tablet; Refill: 0    Overweight with body mass index (BMI) of 29 to 29.9 in adult    Toenail deformity  -     Ambulatory referral/consult to Podiatry; Future; Expected date: 04/28/2025          Patient Instructions  Patient Instructions   Lab obtained in clinic today, we will notify you of results and any necessary changes to plan of care   Refills on routine medications   Follow up on 7/10/25 and as needed; routine medication  Detail Level: Detailed will be due 7/20/25  Referral to podiatry     I will be out of clinic in July due to continuing education conference, that is why your follow up is scheduled earlier in July; Dr Simental and Omid NP will be in clinic if you need them during that time        Fasting goal   Post prandial (two hours after eating)  goal <180  A1c goal <7  Recommend 45-60 grams of carbohydrates per meal   Follow plate method, avoiding sugar sweetened drinks and fruit juice; avoid foods high in concentrated sugars   Do not skip meals  Exercise 30 minutes daily for at least 5 days per week     Optimal blood pressure reading is 130/80 or lower   Recommended DASH diet, stay well hydrated with water daily, eliminate or decrease caffeinated and high calorie drinks, increase physical activity, and lose weight if BMI > 25.0.                                   [1]   Social History  Tobacco Use    Smoking status: Never     Passive exposure: Never    Smokeless tobacco: Never   Substance Use Topics    Alcohol use: Never    Drug use: Never   [2]   Current Outpatient Medications:     amLODIPine (NORVASC) 10 MG tablet, Take 1 tablet (10 mg total) by mouth once daily., Disp: 90 tablet, Rfl: 0    atorvastatin (LIPITOR) 40 MG tablet, Take 1 tablet (40 mg total) by mouth once daily., Disp: 90 tablet, Rfl: 0    blood sugar diagnostic Strp, One touch verio flex Use one test strip to check blood glucose BID, Disp: 180 strip, Rfl: 3    blood-glucose meter (ONETOUCH VERIO FLEX START) kit, Use as directed to check blood glucose, Disp: 1 each, Rfl: 0    estradioL (ESTRACE) 0.01 % (0.1 mg/gram) vaginal cream, Place 1 g vaginally twice a week., Disp: 42.5 g, Rfl: 2    gabapentin (NEURONTIN) 300 MG capsule, Take 1 capsule (300 mg total) by mouth 3 (three) times daily., Disp: 270 capsule, Rfl: 3    lancets (LANCETS,THIN) Misc, Use one lancet to check blood glucose BID, Disp: 200 each, Rfl: 3    losartan (COZAAR) 50 MG tablet, Take 1 tablet (50 mg total) by  mouth once daily., Disp: 90 tablet, Rfl: 0    metFORMIN (GLUCOPHAGE) 500 MG tablet, Take 1 tablet (500 mg total) by mouth 2 (two) times daily., Disp: 180 tablet, Rfl: 0    ONETOUCH DELICA PLUS LANCET 33 gauge Misc, Apply topically., Disp: , Rfl:

## 2025-04-21 ENCOUNTER — OFFICE VISIT (OUTPATIENT)
Dept: FAMILY MEDICINE | Facility: CLINIC | Age: 70
End: 2025-04-21
Payer: COMMERCIAL

## 2025-04-21 VITALS
RESPIRATION RATE: 13 BRPM | HEART RATE: 72 BPM | BODY MASS INDEX: 29.87 KG/M2 | HEIGHT: 62 IN | WEIGHT: 162.31 LBS | DIASTOLIC BLOOD PRESSURE: 57 MMHG | SYSTOLIC BLOOD PRESSURE: 120 MMHG | OXYGEN SATURATION: 98 %

## 2025-04-21 DIAGNOSIS — I10 ESSENTIAL HYPERTENSION: Chronic | ICD-10-CM

## 2025-04-21 DIAGNOSIS — E11.9 TYPE 2 DIABETES MELLITUS WITHOUT COMPLICATION, WITHOUT LONG-TERM CURRENT USE OF INSULIN: Primary | Chronic | ICD-10-CM

## 2025-04-21 DIAGNOSIS — E66.3 OVERWEIGHT WITH BODY MASS INDEX (BMI) OF 29 TO 29.9 IN ADULT: ICD-10-CM

## 2025-04-21 DIAGNOSIS — L60.8 TOENAIL DEFORMITY: ICD-10-CM

## 2025-04-21 DIAGNOSIS — E78.5 HYPERLIPIDEMIA, UNSPECIFIED HYPERLIPIDEMIA TYPE: Chronic | ICD-10-CM

## 2025-04-21 LAB
EST. AVERAGE GLUCOSE BLD GHB EST-MCNC: 126 MG/DL
HBA1C MFR BLD HPLC: 6 %

## 2025-04-21 PROCEDURE — 1159F MED LIST DOCD IN RCRD: CPT | Mod: ,,, | Performed by: NURSE PRACTITIONER

## 2025-04-21 PROCEDURE — 3061F NEG MICROALBUMINURIA REV: CPT | Mod: ,,, | Performed by: NURSE PRACTITIONER

## 2025-04-21 PROCEDURE — 1126F AMNT PAIN NOTED NONE PRSNT: CPT | Mod: ,,, | Performed by: NURSE PRACTITIONER

## 2025-04-21 PROCEDURE — 3008F BODY MASS INDEX DOCD: CPT | Mod: ,,, | Performed by: NURSE PRACTITIONER

## 2025-04-21 PROCEDURE — 83036 HEMOGLOBIN GLYCOSYLATED A1C: CPT | Mod: ,,, | Performed by: CLINICAL MEDICAL LABORATORY

## 2025-04-21 PROCEDURE — 3066F NEPHROPATHY DOC TX: CPT | Mod: ,,, | Performed by: NURSE PRACTITIONER

## 2025-04-21 PROCEDURE — 99214 OFFICE O/P EST MOD 30 MIN: CPT | Mod: ,,, | Performed by: NURSE PRACTITIONER

## 2025-04-21 PROCEDURE — 1160F RVW MEDS BY RX/DR IN RCRD: CPT | Mod: ,,, | Performed by: NURSE PRACTITIONER

## 2025-04-21 PROCEDURE — 1101F PT FALLS ASSESS-DOCD LE1/YR: CPT | Mod: ,,, | Performed by: NURSE PRACTITIONER

## 2025-04-21 PROCEDURE — 3044F HG A1C LEVEL LT 7.0%: CPT | Mod: ,,, | Performed by: NURSE PRACTITIONER

## 2025-04-21 PROCEDURE — 3288F FALL RISK ASSESSMENT DOCD: CPT | Mod: ,,, | Performed by: NURSE PRACTITIONER

## 2025-04-21 PROCEDURE — 3074F SYST BP LT 130 MM HG: CPT | Mod: ,,, | Performed by: NURSE PRACTITIONER

## 2025-04-21 PROCEDURE — 3078F DIAST BP <80 MM HG: CPT | Mod: ,,, | Performed by: NURSE PRACTITIONER

## 2025-04-21 PROCEDURE — 4010F ACE/ARB THERAPY RXD/TAKEN: CPT | Mod: ,,, | Performed by: NURSE PRACTITIONER

## 2025-04-21 RX ORDER — ATORVASTATIN CALCIUM 40 MG/1
40 TABLET, FILM COATED ORAL DAILY
Qty: 90 TABLET | Refills: 0 | Status: SHIPPED | OUTPATIENT
Start: 2025-04-21

## 2025-04-21 RX ORDER — LOSARTAN POTASSIUM 50 MG/1
50 TABLET ORAL DAILY
Qty: 90 TABLET | Refills: 0 | Status: SHIPPED | OUTPATIENT
Start: 2025-04-21

## 2025-04-21 RX ORDER — METFORMIN HYDROCHLORIDE 500 MG/1
500 TABLET ORAL 2 TIMES DAILY
Qty: 180 TABLET | Refills: 0 | Status: SHIPPED | OUTPATIENT
Start: 2025-04-21

## 2025-04-21 RX ORDER — AMLODIPINE BESYLATE 10 MG/1
10 TABLET ORAL DAILY
Qty: 90 TABLET | Refills: 0 | Status: SHIPPED | OUTPATIENT
Start: 2025-04-21

## 2025-04-21 NOTE — PATIENT INSTRUCTIONS
Lab obtained in clinic today, we will notify you of results and any necessary changes to plan of care   Refills on routine medications   Follow up on 7/10/25 and as needed; routine medication will be due 7/20/25  Referral to podiatry     I will be out of clinic in July due to continuing education conference, that is why your follow up is scheduled earlier in July; Dr Simental and Omid NP will be in clinic if you need them during that time        Fasting goal   Post prandial (two hours after eating)  goal <180  A1c goal <7  Recommend 45-60 grams of carbohydrates per meal   Follow plate method, avoiding sugar sweetened drinks and fruit juice; avoid foods high in concentrated sugars   Do not skip meals  Exercise 30 minutes daily for at least 5 days per week     Optimal blood pressure reading is 130/80 or lower   Recommended DASH diet, stay well hydrated with water daily, eliminate or decrease caffeinated and high calorie drinks, increase physical activity, and lose weight if BMI > 25.0.    
The patient is a 68y Male complaining of chest pain.

## 2025-04-21 NOTE — LETTER
AUTHORIZATION FOR RELEASE OF   CONFIDENTIAL INFORMATION    Dear Dr. Redmond,    We are seeing Dari Valverde, date of birth 1955, in the clinic at [facility]. Shanthi Cuellar FNP is the patient's PCP. Dari Valverde has an outstanding lab/procedure at the time we reviewed her chart. In order to help keep her health information updated, she has authorized us to request the following medical record(s):        (  )  MAMMOGRAM                                      (  )  COLONOSCOPY      (  )  PAP SMEAR                                          (  )  OUTSIDE LAB RESULTS     (  )  DEXA SCAN                                          (X)  EYE EXAM            (  )  FOOT EXAM                                          (  )  ENTIRE RECORD     (  )  OUTSIDE IMMUNIZATIONS                 (  )  _______________         Please fax records to Shanthi Cuellar FNP, [phone]     If you have any questions, please contact Winnie at [phone].           Patient Name: Dari Valverde  : 1955  Patient Phone #: [phone]

## 2025-04-22 ENCOUNTER — RESULTS FOLLOW-UP (OUTPATIENT)
Dept: FAMILY MEDICINE | Facility: CLINIC | Age: 70
End: 2025-04-22

## 2025-05-18 ENCOUNTER — PATIENT MESSAGE (OUTPATIENT)
Dept: FAMILY MEDICINE | Facility: CLINIC | Age: 70
End: 2025-05-18
Payer: COMMERCIAL

## 2025-05-22 ENCOUNTER — TELEPHONE (OUTPATIENT)
Dept: FAMILY MEDICINE | Facility: CLINIC | Age: 70
End: 2025-05-22
Payer: COMMERCIAL

## 2025-05-22 NOTE — TELEPHONE ENCOUNTER
RF TO DR. JUAN ANTONIO BARDALES  Received: Today  Estee Tompkins Staff  05/22/2025 RF appt schedule with Dr. Juan Antonio Bardales for 07/16/2025 at 10:15. Called patient no ans.    Please try to contact patient with appt as well.    Thanks

## 2025-07-08 PROBLEM — R19.5 POSITIVE FECAL IMMUNOCHEMICAL TEST: Status: RESOLVED | Noted: 2025-01-15 | Resolved: 2025-07-08

## 2025-07-08 NOTE — PROGRESS NOTES
Clinic note     Patient name: Dari Valverde is a 70 y.o. female   Chief compliant   Chief Complaint   Patient presents with    Medication Refill     Here for med refills 3 mo follow up. No problems at this time. Would like to do 6 month follow up if possible.     Health Maintenance     TETANUS VACCINE Never done  Pneumococcal Vaccines (Age 50+)(1 of 2 - PCV) Never done  DEXA Scan Never done  Shingles Vaccine(1 of 2) Never done  RSV Vaccine (Age 60+ and Pregnant patients)(1 - Risk 60-74 years 1-dose series) Never done  Diabetic Eye Exam due on 10/05/2024  Lipid Panel due on 2025 :ordered today         Subjective     History of present illness   In clinic for routine follow up and medication refills   Denies any acute concerns at present     Past Medical History: HTN, Type 2 DM, HLD     Last A1c 6.0  Checking blood glucose: BID and prn   Blood glucose log not in clinic      Spine clinic: Dr BEN Martinez   Neurology: Dr Ling  GYN: Dr Martinez   Also followed by [facility], Dr Colindres              Social History[1]    Review of patient's allergies indicates:   Allergen Reactions    Lisinopril      Causes cough        Past Medical History:   Diagnosis Date    Diabetes     Hyperlipemia     Hypertension        Past Surgical History:   Procedure Laterality Date     SECTION      CHOLECYSTECTOMY      HYSTERECTOMY  2024    Surgery was done in [place], [place]    OOPHORECTOMY Bilateral 2024    TUBAL LIGATION  1982        Family History   Problem Relation Name Age of Onset    Stroke Maternal Grandmother Na Hoover     Heart disease Father Hi Santos     Dementia Mother Bucky Walters     Breast cancer Neg Hx      Colon cancer Neg Hx      Ovarian cancer Neg Hx         Current Medications[2]    Review of Systems   Constitutional:  Negative for appetite change, chills, fatigue, fever and unexpected weight change.   Respiratory:  Negative for cough and shortness of breath.    Cardiovascular:  Negative for chest  "pain, palpitations and leg swelling.   Gastrointestinal:  Negative for abdominal pain, change in bowel habit, constipation, diarrhea, nausea and vomiting.   Genitourinary:  Negative for dysuria and frequency.   Musculoskeletal:  Negative for arthralgias, gait problem and myalgias.   Neurological:  Negative for dizziness, syncope, light-headedness and headaches.   Psychiatric/Behavioral:  Negative for dysphoric mood and sleep disturbance. The patient is not nervous/anxious.        Objective     BP (!) 149/77 (BP Location: Right arm, Patient Position: Sitting)   Pulse 83   Ht 5' 2" (1.575 m)   Wt 72.5 kg (159 lb 14.4 oz)   SpO2 99%   BMI 29.25 kg/m²     Physical Exam   Constitutional: She is oriented to person, place, and time. No distress.   HENT:   Head: Atraumatic.   Mouth/Throat: Mucous membranes are moist.   Cardiovascular: Normal rate and regular rhythm. Pulmonary:      Effort: Pulmonary effort is normal. No respiratory distress.      Breath sounds: Normal breath sounds. No wheezing, rhonchi or rales.     Abdominal: Soft. Bowel sounds are normal. She exhibits no distension. There is no abdominal tenderness.   Musculoskeletal:         General: Normal range of motion.      Cervical back: Neck supple.      Right lower leg: No edema.      Left lower leg: No edema.   Neurological: She is alert and oriented to person, place, and time. Gait normal.   Skin: Skin is warm and dry.   Psychiatric: Her behavior is normal. Mood normal.       Lab Results   Component Value Date    WBC 6.20 01/15/2025    HGB 11.9 (L) 01/15/2025    HCT 36.3 (L) 01/15/2025    MCV 86.0 01/15/2025     01/15/2025       CMP  Sodium   Date Value Ref Range Status   01/15/2025 140 136 - 145 mmol/L Final     Potassium   Date Value Ref Range Status   01/15/2025 3.7 3.5 - 5.1 mmol/L Final     Chloride   Date Value Ref Range Status   01/15/2025 106 98 - 107 mmol/L Final     CO2   Date Value Ref Range Status   01/15/2025 26 23 - 31 mmol/L Final " "    Glucose   Date Value Ref Range Status   01/15/2025 115 82 - 115 mg/dL Final     BUN   Date Value Ref Range Status   01/15/2025 6 (L) 10 - 20 mg/dL Final     Creatinine   Date Value Ref Range Status   01/15/2025 0.71 0.55 - 1.02 mg/dL Final     Calcium   Date Value Ref Range Status   01/15/2025 9.6 8.4 - 10.2 mg/dL Final     Total Protein   Date Value Ref Range Status   01/15/2025 7.8 (H) 5.8 - 7.6 g/dL Final     Albumin   Date Value Ref Range Status   01/15/2025 4.1 3.4 - 4.8 g/dL Final     Bilirubin, Total   Date Value Ref Range Status   01/15/2025 0.3 <=1.5 mg/dL Final     Alk Phos   Date Value Ref Range Status   01/15/2025 79 40 - 150 U/L Final     AST   Date Value Ref Range Status   01/15/2025 23 5 - 34 U/L Final     ALT   Date Value Ref Range Status   01/15/2025 22 <=55 U/L Final     Anion Gap   Date Value Ref Range Status   01/15/2025 12 7 - 16 mmol/L Final     eGFR   Date Value Ref Range Status   04/05/2022 80 >=60 mL/min/1.73m² Final     Lab Results   Component Value Date    TSH 1.510 06/21/2023     Lab Results   Component Value Date    CHOL 157 07/08/2024    CHOL 206 (H) 06/21/2023    CHOL 139 04/05/2022     Lab Results   Component Value Date    HDL 66 (H) 07/08/2024    HDL 58 06/21/2023    HDL 70 (H) 04/05/2022     Lab Results   Component Value Date    LDLCALC 72 07/08/2024    LDLCALC 103 06/21/2023    LDLCALC 44 04/05/2022     Lab Results   Component Value Date    TRIG 97 07/08/2024    TRIG 224 (H) 06/21/2023    TRIG 126 04/05/2022     Lab Results   Component Value Date    CHOLHDL 2.4 07/08/2024    CHOLHDL 3.6 06/21/2023    CHOLHDL 2.0 04/05/2022     Lab Results   Component Value Date    HGBA1C 6.0 04/21/2025       No results found for: "[id number]", "FLUAMOLEC", "FLUBMOLEC", "MOLSTREPAPOC"  Any diagnostic testing results obtained in office or prior to appointment were reviewed were reviewed with patient.    Health Maintenance Due   Topic Date Due    TETANUS VACCINE  Never done    Pneumococcal " Vaccines (Age 50+) (1 of 2 - PCV) Never done    DEXA Scan  Never done    Shingles Vaccine (1 of 2) Never done    RSV Vaccine (Age 60+ and Pregnant patients) (1 - Risk 60-74 years 1-dose series) Never done    Diabetic Eye Exam  10/05/2024    Lipid Panel  07/08/2025         Health Maintenance Topics with due status: Not Due       Topic Last Completion Date    Influenza Vaccine 09/24/2024    Diabetes Urine Screening 01/15/2025    Mammogram 01/30/2025    Colorectal Cancer Screening 03/20/2025    Low Dose Statin 04/21/2025    Foot Exam 04/21/2025    Hemoglobin A1c 04/21/2025         Assessment and Plan   Type 2 diabetes mellitus without complication, without long-term current use of insulin  -     metFORMIN (GLUCOPHAGE) 500 MG tablet; Take 1 tablet (500 mg total) by mouth 2 (two) times daily.  Dispense: 180 tablet; Refill: 1  -     Hemoglobin A1C; Future; Expected date: 07/09/2025    Essential hypertension  -     amLODIPine (NORVASC) 10 MG tablet; Take 1 tablet (10 mg total) by mouth once daily.  Dispense: 90 tablet; Refill: 1  -     losartan (COZAAR) 50 MG tablet; Take 1 tablet (50 mg total) by mouth once daily.  Dispense: 90 tablet; Refill: 1  -     Comprehensive Metabolic Panel; Future; Expected date: 07/09/2025  -     CBC Auto Differential; Future; Expected date: 07/09/2025    Hyperlipidemia, unspecified hyperlipidemia type  -     atorvastatin (LIPITOR) 40 MG tablet; Take 1 tablet (40 mg total) by mouth once daily.  Dispense: 90 tablet; Refill: 3  -     Lipid Panel; Future; Expected date: 07/09/2025    Overweight with body mass index (BMI) of 29 to 29.9 in adult          Patient Instructions  Patient Instructions   Lab obtained in clinic today, we will notify you of results and any necessary changes to plan of care   Refills on routine medications   Follow up on 12/30/2025 and as needed; routine medication will be due 1/5/2026    Optimal blood pressure reading is 130/80 or lower   Recommended DASH diet, stay well  hydrated with water daily, eliminate or decrease caffeinated and high calorie drinks, increase physical activity, and lose weight if BMI > 25.0.    I will be out of clinic in July (7/11-7/20)  due to continuing education conference; Dr Simental and Omid NP will be in clinic if you need them during that time                                        [1]   Social History  Tobacco Use    Smoking status: Never     Passive exposure: Never    Smokeless tobacco: Never   Substance Use Topics    Alcohol use: Never    Drug use: Never   [2]   Current Outpatient Medications:     estradioL (ESTRACE) 0.01 % (0.1 mg/gram) vaginal cream, Place 1 g vaginally twice a week., Disp: 42.5 g, Rfl: 2    gabapentin (NEURONTIN) 300 MG capsule, Take 1 capsule (300 mg total) by mouth 3 (three) times daily., Disp: 270 capsule, Rfl: 3    amLODIPine (NORVASC) 10 MG tablet, Take 1 tablet (10 mg total) by mouth once daily., Disp: 90 tablet, Rfl: 1    atorvastatin (LIPITOR) 40 MG tablet, Take 1 tablet (40 mg total) by mouth once daily., Disp: 90 tablet, Rfl: 3    blood sugar diagnostic Strp, One touch verio flex Use one test strip to check blood glucose BID, Disp: 180 strip, Rfl: 3    blood-glucose meter (ONETOUCH VERIO FLEX START) kit, Use as directed to check blood glucose, Disp: 1 each, Rfl: 0    lancets (LANCETS,THIN) Misc, Use one lancet to check blood glucose BID, Disp: 200 each, Rfl: 3    losartan (COZAAR) 50 MG tablet, Take 1 tablet (50 mg total) by mouth once daily., Disp: 90 tablet, Rfl: 1    metFORMIN (GLUCOPHAGE) 500 MG tablet, Take 1 tablet (500 mg total) by mouth 2 (two) times daily., Disp: 180 tablet, Rfl: 1    ONETOUCH DELICA PLUS LANCET 33 gauge Misc, Apply topically., Disp: , Rfl:

## 2025-07-08 NOTE — PROGRESS NOTES
"  Dari Valverde presented for a follow-up Medicare AWV today. The following components were reviewed and updated:    Medical history  Family History  Social history  Allergies and Current Medications  Health Risk Assessment  Health Maintenance  Care Team    **See Completed Assessments for Annual Wellness visit with in the encounter summary    The following assessments were completed:  Depression Screening  Cognitive function Screening  Timed Get Up Test  Whisper Test        Opioid documentation:      Patient does not have a current opioid prescription.          Vitals:    07/10/25 1506 07/10/25 1525   BP: (!) 149/82 (!) 144/82   BP Location: Left arm    Patient Position: Sitting    Pulse: 78    Resp: 18    Temp: 98.4 °F (36.9 °C)    SpO2: 97%    Weight: 73.1 kg (161 lb 1.6 oz)    Height: 5' 2" (1.575 m)      Body mass index is 29.47 kg/m².       Physical Exam  Constitutional: She is oriented to person, place, and time. No distress.   HENT:   Head: Atraumatic.   Mouth/Throat: Mucous membranes are moist.   Cardiovascular: Normal rate and regular rhythm. Pulmonary:      Effort: Pulmonary effort is normal. No respiratory distress.      Breath sounds: Normal breath sounds. No wheezing, rhonchi or rales.      Abdominal: Soft. Bowel sounds are normal. She exhibits no distension. There is no abdominal tenderness.   Musculoskeletal:         General: Normal range of motion.      Cervical back: Neck supple.      Right lower leg: No edema.      Left lower leg: No edema.   Neurological: She is alert and oriented to person, place, and time. Gait normal.   Skin: Skin is warm and dry.   Psychiatric: Her behavior is normal. Mood normal.     Diagnoses and health risks identified today and associated recommendations/orders:  1. Encounter for subsequent annual wellness visit (AWV) in Medicare patient  Appointment for AWV in one year       2. Essential hypertension  Continue current medication, DASH diet, home blood pressure " monitoring, physical activity 30 minutes on 5 days per week       3. Hyperlipidemia, unspecified hyperlipidemia type  Continue current medication, low saturated fat low cholesterol heart healthy diet, physical exercise 30 minutes on five days per week      4. Type 2 diabetes mellitus without complication, without long-term current use of insulin  Continue current medication, diabetic diet, home blood glucose monitoring, a1c every 3-6 months, physical activity 30 minutes 5 days per week       Health Maintenance   Topic Date Due    TETANUS VACCINE  Never done    Pneumococcal Vaccines (Age 50+) (1 of 2 - PCV) Never done    DEXA Scan  Never done    Shingles Vaccine (1 of 2) Never done    RSV Vaccine (Age 60+ and Pregnant patients) (1 - Risk 60-74 years 1-dose series) Never done    Diabetic Eye Exam  10/05/2024    Influenza Vaccine (1) 09/01/2025    Hemoglobin A1c  01/09/2026    Diabetes Urine Screening  01/15/2026    Mammogram  01/30/2026    Foot Exam  04/21/2026    Lipid Panel  07/09/2026    Low Dose Statin  07/10/2026    Colorectal Cancer Screening  03/20/2030    Hepatitis C Screening  Completed    COVID-19 Vaccine  Completed     Health Maintenance Topics with due status: Not Due       Topic Last Completion Date    Influenza Vaccine 09/24/2024    Diabetes Urine Screening 01/15/2025    Mammogram 01/30/2025    Colorectal Cancer Screening 03/20/2025    Foot Exam 04/21/2025    Lipid Panel 07/09/2025    Hemoglobin A1c 07/09/2025    Low Dose Statin 07/10/2025     Health Maintenance Due   Topic Date Due    TETANUS VACCINE  Never done    Pneumococcal Vaccines (Age 50+) (1 of 2 - PCV) Never done    DEXA Scan  Never done    Shingles Vaccine (1 of 2) Never done    RSV Vaccine (Age 60+ and Pregnant patients) (1 - Risk 60-74 years 1-dose series) Never done    Diabetic Eye Exam  10/05/2024       Discussed importance of vaccines, pt will consider and obtain at pharmacy if desires.   Declines DEXA   Will request last diabetic eye  exam report from Dr. Altamirano    Provided Dari with a 5-10 year written screening schedule and personal prevention plan. Recommendations were developed using the USPSTF age appropriate recommendations. Education, counseling, and referrals were provided as needed.  After Visit Summary printed and given to patient which includes a list of additional screenings\tests needed.    Follow up for 1 year for Annual Wellness Visit.      Shanthi Cuellar, CORINE    I offered to discuss advanced care planning, including how to pick a person who would make decisions for you if you were unable to make them for yourself, called a health care power of , and what kind of decisions you might make such as use of life sustaining treatments such as ventilators and tube feeding when faced with a life limiting illness recorded on a living will that they will need to know. (How you want to be cared for as you near the end of your natural life)     X Patient is interested in learning more about how to make advanced directives.  I provided them paperwork and offered to discuss this with them.

## 2025-07-09 ENCOUNTER — OFFICE VISIT (OUTPATIENT)
Dept: FAMILY MEDICINE | Facility: CLINIC | Age: 70
End: 2025-07-09
Payer: COMMERCIAL

## 2025-07-09 VITALS
HEIGHT: 62 IN | WEIGHT: 159.88 LBS | HEART RATE: 83 BPM | DIASTOLIC BLOOD PRESSURE: 77 MMHG | SYSTOLIC BLOOD PRESSURE: 131 MMHG | OXYGEN SATURATION: 99 % | BODY MASS INDEX: 29.42 KG/M2

## 2025-07-09 DIAGNOSIS — E11.9 TYPE 2 DIABETES MELLITUS WITHOUT COMPLICATION, WITHOUT LONG-TERM CURRENT USE OF INSULIN: Primary | Chronic | ICD-10-CM

## 2025-07-09 DIAGNOSIS — E66.3 OVERWEIGHT WITH BODY MASS INDEX (BMI) OF 29 TO 29.9 IN ADULT: ICD-10-CM

## 2025-07-09 DIAGNOSIS — E78.5 HYPERLIPIDEMIA, UNSPECIFIED HYPERLIPIDEMIA TYPE: Chronic | ICD-10-CM

## 2025-07-09 DIAGNOSIS — I10 ESSENTIAL HYPERTENSION: ICD-10-CM

## 2025-07-09 LAB
ALBUMIN SERPL BCP-MCNC: 3.9 G/DL (ref 3.4–4.8)
ALBUMIN/GLOB SERPL: 1.1 {RATIO}
ALP SERPL-CCNC: 85 U/L (ref 40–150)
ALT SERPL W P-5'-P-CCNC: 15 U/L
ANION GAP SERPL CALCULATED.3IONS-SCNC: 13 MMOL/L (ref 7–16)
AST SERPL W P-5'-P-CCNC: 18 U/L (ref 11–45)
BASOPHILS # BLD AUTO: 0.02 K/UL (ref 0–0.2)
BASOPHILS NFR BLD AUTO: 0.3 % (ref 0–1)
BILIRUB SERPL-MCNC: 0.3 MG/DL
BUN SERPL-MCNC: 8 MG/DL (ref 10–20)
BUN/CREAT SERPL: 11 (ref 6–20)
CALCIUM SERPL-MCNC: 9.1 MG/DL (ref 8.4–10.2)
CHLORIDE SERPL-SCNC: 106 MMOL/L (ref 98–107)
CHOLEST SERPL-MCNC: 159 MG/DL
CHOLEST/HDLC SERPL: 2.7 {RATIO}
CO2 SERPL-SCNC: 25 MMOL/L (ref 23–31)
CREAT SERPL-MCNC: 0.75 MG/DL (ref 0.55–1.02)
DIFFERENTIAL METHOD BLD: ABNORMAL
EGFR (NO RACE VARIABLE) (RUSH/TITUS): 85 ML/MIN/1.73M2
EOSINOPHIL # BLD AUTO: 0.07 K/UL (ref 0–0.5)
EOSINOPHIL NFR BLD AUTO: 1.2 % (ref 1–4)
ERYTHROCYTE [DISTWIDTH] IN BLOOD BY AUTOMATED COUNT: 13 % (ref 11.5–14.5)
EST. AVERAGE GLUCOSE BLD GHB EST-MCNC: 134 MG/DL
GLOBULIN SER-MCNC: 3.5 G/DL (ref 2–4)
GLUCOSE SERPL-MCNC: 102 MG/DL (ref 82–115)
HBA1C MFR BLD HPLC: 6.3 %
HCT VFR BLD AUTO: 36.7 % (ref 38–47)
HDLC SERPL-MCNC: 60 MG/DL (ref 35–60)
HGB BLD-MCNC: 11.8 G/DL (ref 12–16)
IMM GRANULOCYTES # BLD AUTO: 0.02 K/UL (ref 0–0.04)
IMM GRANULOCYTES NFR BLD: 0.3 % (ref 0–0.4)
LDLC SERPL CALC-MCNC: 81 MG/DL
LDLC/HDLC SERPL: 1.4 {RATIO}
LYMPHOCYTES # BLD AUTO: 1.79 K/UL (ref 1–4.8)
LYMPHOCYTES NFR BLD AUTO: 30.1 % (ref 27–41)
MCH RBC QN AUTO: 28.4 PG (ref 27–31)
MCHC RBC AUTO-ENTMCNC: 32.2 G/DL (ref 32–36)
MCV RBC AUTO: 88.4 FL (ref 80–96)
MONOCYTES # BLD AUTO: 0.52 K/UL (ref 0–0.8)
MONOCYTES NFR BLD AUTO: 8.7 % (ref 2–6)
MPC BLD CALC-MCNC: 10.3 FL (ref 9.4–12.4)
NEUTROPHILS # BLD AUTO: 3.53 K/UL (ref 1.8–7.7)
NEUTROPHILS NFR BLD AUTO: 59.4 % (ref 53–65)
NONHDLC SERPL-MCNC: 99 MG/DL
NRBC # BLD AUTO: 0 X10E3/UL
NRBC, AUTO (.00): 0 %
PLATELET # BLD AUTO: 332 K/UL (ref 150–400)
POTASSIUM SERPL-SCNC: 3.9 MMOL/L (ref 3.5–5.1)
PROT SERPL-MCNC: 7.4 G/DL (ref 5.8–7.6)
RBC # BLD AUTO: 4.15 M/UL (ref 4.2–5.4)
SODIUM SERPL-SCNC: 140 MMOL/L (ref 136–145)
TRIGL SERPL-MCNC: 92 MG/DL (ref 37–140)
VLDLC SERPL-MCNC: 18 MG/DL
WBC # BLD AUTO: 5.95 K/UL (ref 4.5–11)

## 2025-07-09 PROCEDURE — 1160F RVW MEDS BY RX/DR IN RCRD: CPT | Mod: ,,, | Performed by: NURSE PRACTITIONER

## 2025-07-09 PROCEDURE — 3075F SYST BP GE 130 - 139MM HG: CPT | Mod: ,,, | Performed by: NURSE PRACTITIONER

## 2025-07-09 PROCEDURE — 3061F NEG MICROALBUMINURIA REV: CPT | Mod: ,,, | Performed by: NURSE PRACTITIONER

## 2025-07-09 PROCEDURE — 4010F ACE/ARB THERAPY RXD/TAKEN: CPT | Mod: ,,, | Performed by: NURSE PRACTITIONER

## 2025-07-09 PROCEDURE — 1159F MED LIST DOCD IN RCRD: CPT | Mod: ,,, | Performed by: NURSE PRACTITIONER

## 2025-07-09 PROCEDURE — 80053 COMPREHEN METABOLIC PANEL: CPT | Mod: ,,, | Performed by: CLINICAL MEDICAL LABORATORY

## 2025-07-09 PROCEDURE — 3044F HG A1C LEVEL LT 7.0%: CPT | Mod: ,,, | Performed by: NURSE PRACTITIONER

## 2025-07-09 PROCEDURE — 80061 LIPID PANEL: CPT | Mod: ,,, | Performed by: CLINICAL MEDICAL LABORATORY

## 2025-07-09 PROCEDURE — 3078F DIAST BP <80 MM HG: CPT | Mod: ,,, | Performed by: NURSE PRACTITIONER

## 2025-07-09 PROCEDURE — 1126F AMNT PAIN NOTED NONE PRSNT: CPT | Mod: ,,, | Performed by: NURSE PRACTITIONER

## 2025-07-09 PROCEDURE — 3066F NEPHROPATHY DOC TX: CPT | Mod: ,,, | Performed by: NURSE PRACTITIONER

## 2025-07-09 PROCEDURE — 3008F BODY MASS INDEX DOCD: CPT | Mod: ,,, | Performed by: NURSE PRACTITIONER

## 2025-07-09 PROCEDURE — 1101F PT FALLS ASSESS-DOCD LE1/YR: CPT | Mod: ,,, | Performed by: NURSE PRACTITIONER

## 2025-07-09 PROCEDURE — 99214 OFFICE O/P EST MOD 30 MIN: CPT | Mod: ,,, | Performed by: NURSE PRACTITIONER

## 2025-07-09 PROCEDURE — 3288F FALL RISK ASSESSMENT DOCD: CPT | Mod: ,,, | Performed by: NURSE PRACTITIONER

## 2025-07-09 PROCEDURE — 83036 HEMOGLOBIN GLYCOSYLATED A1C: CPT | Mod: ,,, | Performed by: CLINICAL MEDICAL LABORATORY

## 2025-07-09 PROCEDURE — 85025 COMPLETE CBC W/AUTO DIFF WBC: CPT | Mod: ,,, | Performed by: CLINICAL MEDICAL LABORATORY

## 2025-07-09 RX ORDER — AMLODIPINE BESYLATE 10 MG/1
10 TABLET ORAL DAILY
Qty: 90 TABLET | Refills: 1 | Status: SHIPPED | OUTPATIENT
Start: 2025-07-09

## 2025-07-09 RX ORDER — LOSARTAN POTASSIUM 50 MG/1
50 TABLET ORAL DAILY
Qty: 90 TABLET | Refills: 1 | Status: SHIPPED | OUTPATIENT
Start: 2025-07-09

## 2025-07-09 RX ORDER — METFORMIN HYDROCHLORIDE 500 MG/1
500 TABLET ORAL 2 TIMES DAILY
Qty: 180 TABLET | Refills: 1 | Status: SHIPPED | OUTPATIENT
Start: 2025-07-09

## 2025-07-09 RX ORDER — ATORVASTATIN CALCIUM 40 MG/1
40 TABLET, FILM COATED ORAL DAILY
Qty: 90 TABLET | Refills: 3 | Status: SHIPPED | OUTPATIENT
Start: 2025-07-09

## 2025-07-09 NOTE — PATIENT INSTRUCTIONS
Lab obtained in clinic today, we will notify you of results and any necessary changes to plan of care   Refills on routine medications   Follow up on 12/30/2025 and as needed; routine medication will be due 1/5/2026    Optimal blood pressure reading is 130/80 or lower   Recommended DASH diet, stay well hydrated with water daily, eliminate or decrease caffeinated and high calorie drinks, increase physical activity, and lose weight if BMI > 25.0.    I will be out of clinic in July (7/11-7/20)  due to continuing education conference; Dr Simental and Omid NP will be in clinic if you need them during that time

## 2025-07-10 ENCOUNTER — OFFICE VISIT (OUTPATIENT)
Dept: FAMILY MEDICINE | Facility: CLINIC | Age: 70
End: 2025-07-10
Payer: COMMERCIAL

## 2025-07-10 VITALS
WEIGHT: 161.13 LBS | DIASTOLIC BLOOD PRESSURE: 82 MMHG | HEART RATE: 78 BPM | HEIGHT: 62 IN | TEMPERATURE: 98 F | RESPIRATION RATE: 18 BRPM | BODY MASS INDEX: 29.65 KG/M2 | OXYGEN SATURATION: 97 % | SYSTOLIC BLOOD PRESSURE: 144 MMHG

## 2025-07-10 DIAGNOSIS — E78.5 HYPERLIPIDEMIA, UNSPECIFIED HYPERLIPIDEMIA TYPE: ICD-10-CM

## 2025-07-10 DIAGNOSIS — E11.9 TYPE 2 DIABETES MELLITUS WITHOUT COMPLICATION, WITHOUT LONG-TERM CURRENT USE OF INSULIN: ICD-10-CM

## 2025-07-10 DIAGNOSIS — I10 ESSENTIAL HYPERTENSION: ICD-10-CM

## 2025-07-10 DIAGNOSIS — Z00.00 ENCOUNTER FOR SUBSEQUENT ANNUAL WELLNESS VISIT (AWV) IN MEDICARE PATIENT: Primary | ICD-10-CM

## 2025-07-10 PROCEDURE — 3077F SYST BP >= 140 MM HG: CPT | Mod: ,,, | Performed by: NURSE PRACTITIONER

## 2025-07-10 PROCEDURE — 1159F MED LIST DOCD IN RCRD: CPT | Mod: ,,, | Performed by: NURSE PRACTITIONER

## 2025-07-10 PROCEDURE — 4010F ACE/ARB THERAPY RXD/TAKEN: CPT | Mod: ,,, | Performed by: NURSE PRACTITIONER

## 2025-07-10 PROCEDURE — 3288F FALL RISK ASSESSMENT DOCD: CPT | Mod: ,,, | Performed by: NURSE PRACTITIONER

## 2025-07-10 PROCEDURE — 1170F FXNL STATUS ASSESSED: CPT | Mod: ,,, | Performed by: NURSE PRACTITIONER

## 2025-07-10 PROCEDURE — G0439 PPPS, SUBSEQ VISIT: HCPCS | Mod: ,,, | Performed by: NURSE PRACTITIONER

## 2025-07-10 PROCEDURE — 1158F ADVNC CARE PLAN TLK DOCD: CPT | Mod: ,,, | Performed by: NURSE PRACTITIONER

## 2025-07-10 PROCEDURE — 1101F PT FALLS ASSESS-DOCD LE1/YR: CPT | Mod: ,,, | Performed by: NURSE PRACTITIONER

## 2025-07-10 PROCEDURE — 3044F HG A1C LEVEL LT 7.0%: CPT | Mod: ,,, | Performed by: NURSE PRACTITIONER

## 2025-07-10 PROCEDURE — 3061F NEG MICROALBUMINURIA REV: CPT | Mod: ,,, | Performed by: NURSE PRACTITIONER

## 2025-07-10 PROCEDURE — 3066F NEPHROPATHY DOC TX: CPT | Mod: ,,, | Performed by: NURSE PRACTITIONER

## 2025-07-10 PROCEDURE — 1126F AMNT PAIN NOTED NONE PRSNT: CPT | Mod: ,,, | Performed by: NURSE PRACTITIONER

## 2025-07-10 PROCEDURE — 3079F DIAST BP 80-89 MM HG: CPT | Mod: ,,, | Performed by: NURSE PRACTITIONER

## 2025-07-10 NOTE — PATIENT INSTRUCTIONS
Counseling and Referral of Other Preventative  (Italic type indicates deductible and co-insurance are waived)    Patient Name: Dari Valverde  Today's Date: 7/10/2025    Health Maintenance       Date Due Completion Date    TETANUS VACCINE Never done ---    Pneumococcal Vaccines (Age 50+) (1 of 2 - PCV) Never done ---    DEXA Scan Never done ---    Shingles Vaccine (1 of 2) Never done ---    RSV Vaccine (Age 60+ and Pregnant patients) (1 - Risk 60-74 years 1-dose series) Never done ---    Diabetic Eye Exam 10/05/2024 10/5/2023    Influenza Vaccine (1) 09/01/2025 9/24/2024    Override on 11/1/2022: Done (Pt states she has already taken this year.)    Override on 11/1/2022: Declined (pt states she has already taken this year.)    Hemoglobin A1c 01/09/2026 7/9/2025    Diabetes Urine Screening 01/15/2026 1/15/2025    Mammogram 01/30/2026 1/30/2025    Foot Exam 04/21/2026 4/21/2025    Override on 4/21/2025: Done    Lipid Panel 07/09/2026 7/9/2025    Low Dose Statin 07/10/2026 7/10/2025    Colorectal Cancer Screening 03/20/2030 3/20/2025        No orders of the defined types were placed in this encounter.    The following information is provided to all patients.  This information is to help you find resources for any of the problems found today that may be affecting your health:                  Living healthy guide: [place].gov    Understanding Diabetes: www.diabetes.org      Eating healthy: www.cdc.gov/healthyweight      CDC home safety checklist: www.cdc.gov/steadi/patient.html      Agency on Aging: [place].gov    Alcoholics anonymous (AA): www.aa.org      Physical Activity: www.kaylie.nih.gov/nl2aaca      Tobacco use: [place].gov

## 2025-07-10 NOTE — LETTER
AUTHORIZATION FOR RELEASE OF   CONFIDENTIAL INFORMATION    Dear Dr. Altamirano,    We are seeing Dari Valverde, date of birth 1955, in the clinic at [facility]. Shanthi Cuellar FNP is the patient's PCP. Dari Valverde has an outstanding lab/procedure at the time we reviewed her chart. In order to help keep her health information updated, she has authorized us to request the following medical record(s):        (  )  MAMMOGRAM                                      (  )  COLONOSCOPY      (  )  PAP SMEAR                                          (  )  OUTSIDE LAB RESULTS     (  )  DEXA SCAN                                          ( x )  EYE EXAM            (  )  FOOT EXAM                                          (  )  ENTIRE RECORD     (  )  OUTSIDE IMMUNIZATIONS                 (  )  _______________         Please fax records to Shanthi Cuellar FNP, [phone]     If you have any questions, please contact Boubacar Mcneil RN at [phone].           Patient Name: Dari Valverde  : 1955  Patient Phone #: [phone]                        Dari Valverde  MRN: [id number]  : 1955  Age: 69 y.o.  Sex: female         Patient/Legal Guardian Signature  This signature was collected at 2025           _______________________________   Printed Name/Relationship to Patient      Consent for Examination and Treatment: I hereby authorize the providers and employees of [facility] ([facility]) to provide medical treatment/services which includes, but is not limited to, performing and administering tests and diagnostic procedures that are deemed necessary, including, but not limited to, imaging examinations, blood tests and other laboratory procedures as may be required by the hospital, clinic, or may be ordered by my physician(s) or persons working under the general and/or special instructions of my physician(s).      I understand and agree that this consent covers all authorized  persons, including but not limited to physicians, residents, nurse practitioners, physicians' assistants, specialists, consultants, student nurses, and independently contracted physicians, who are called upon by the physician in charge, to carry out the diagnostic procedures and medical or surgical treatment.     I hereby authorize [facility] to retain or dispose of any specimens or tissue, should there be such remaining from any test or procedure.     I hereby authorize and give consent for [facility] providers and employees to take photographs, images or videotapes of such diagnostic, surgical or treatment procedures of Patient as may be required by [facility] or as may be ordered by a physician. I further acknowledge and agree that [facility] may use cameras or other devices for patient monitoring.     I am aware that the practice of medicine is not an exact science, and I acknowledge that no guarantees have been made to me as to the outcome of any tests, procedures or treatment.     Authorization for Release of Information: I understand that my insurance company and/or their agents may need information necessary to make determinations about payment/reimbursement. I hereby provide authorization to release to all insurance companies, their successors, assignees, other parties with whom they may have contracted, or others acting on their behalf, that are involved with payment for any hospital and/or clinic charges incurred by the patient, any information that they request and deem necessary for payment/reimbursement, and/or quality review.  I further authorize the release of my health information to physicians or other health care practitioners on staff who are involved in my health care now and in the future, and to other health care providers, entities, or institutions for the purpose of my continued care and treatment, including referrals.     REGISTRATION AUTHORIZATION  Form No. 06891 (Rev. 3/25/2024)    Page 1 of 3                        Medicare Patient's Certification and Authorization to Release Information and Payment Request:  I certify that the information given by me in applying for payment under Title XVIII of the Social Security Act is correct. I authorize any monroy of medical or other information about me to release to the Social SecurityAdministration, or its intermediaries or carriers, any information needed for this or a related Medicare claim. I request that payment of authorized benefits be made on my behalf.     Assignment of Insurance Benefits:   I hereby authorize any and all insurance companies, health plans, defined   benefit plans, health insurers or any entity that is or may be responsible for payment of my medical expenses to pay all hospital and medical benefits now due, and to become due and payable to me under any hospital benefits, sick benefits, injury benefits or any other benefit for services rendered to me, including Major Medical Benefits, direct to [facility] and all independently contracted physicians. I assign any and all rights that I may have against any and all insurance companies, health plans, defined benefit plans, health insurers or any entity that is or may be responsible for payment of my medical expenses, including, but not limited to any right to appeal a denial of a claim, any right to bring any action, lawsuit, administrative proceeding, or other cause of action on my behalf. I specifically assign my right to pursue litigation against any and all insurance companies, health plans, defined benefit plans, health insurers or any entity that is or may be responsible for payment of my medical expenses based upon a refusal to pay charges.            E. Valuables: It is understood and agreed that [facility] is not liable for the damage to or loss of any money, jewelry,   documents, dentures, eye glasses, hearing aids, prosthetics, or other property of value.     F. Computer Equipment: I understand  and agree that should I choose to use computer equipment owned by [facility] or if I choose to access the Internet via [facility]s network, I do so at my own risk. [facility] is not responsible for any damage to my computer equipment or to any damages of any type that might arise from my loss of equipment or data.     G. Acceptance of Financial Responsibility:  I agree that in consideration of the services and   supplies that have been   or will be furnished to the patient, I am hereby obligated to pay all charges made for or on the account of the patient according to the standard rates (in effect at the time the services and supplies are delivered) established by [facility], including its Patient Financial Assistance Policy to the extent it is applicable. I understand that I am responsible for all charges, or portions thereof, not covered by insurance or other sources. Patient refunds will be distributed only after balances at all [facility] facilities are paid.     H. Communication Authorization:  I hereby authorize [facility] and its representatives, along with any billing service   or  who may work on their behalf, to contact me on   my cell phone and/or home phone using pre- recorded messages, artificial voice messages, automatic telephone dialing devices or other computer assisted technology, or by electronic      mail, text messaging, or by any other form of electronic communication. This includes, but is not limited to, appointment reminders, yearly physical exam reminders, preventive care reminders, patient campaigns, welcome calls, and calls about account balances on my account or any account on which I am listed as a guarantor. I understand I have the right to opt out of these communications at any time.      Relationship  Between  Facility and  Provider:      I understand that some, but not all, providers furnishing services to the patient are not employees or agents of [facility]. The patient is under the  care and supervision of his/her attending physician, and it is the responsibility of the facility and its nursing staff to carry out the instructions of such physicians. It is the responsibility of the patient's physician/designee to obtain the patient's informed consent, when required, for medical or surgical treatment, special diagnostic or therapeutic procedures, or hospital services rendered for the patient under the special instructions of the physician/designee.           REGISTRATION AUTHORIZATION  Form No. 37995 (Rev. 3/25/2024)    Page 2 of 3                       Immunizations: [facility] shares immunization information with state sponsored health departments to help you and your doctor keep track of your immunization records. By signing, you consent to have this information shared with the health department in your state:                                [place] - LINKS ([place] Immunization Network for Kids Statewide)                                [place] - MIIX ([place] Immunization Information eXchange)                                [place] - ImmPRINT (Immunization Patient Registry with Integrated Technology)     TERM: This authorization is valid for this and subsequent care/treatment I receive at [facility] and will remain valid unless/until revoked in writing by me.     [facility]: As used in this document, [facility] means all [facility] owned and managed facilities, including, but not limited to, all health centers, surgery centers, clinics, urgent care centers, and hospitals.         [facility] complies with applicable Federal civil rights laws and does not discriminate on the basis of race, color, national origin, age, disability, or sex.  ATENCIÓN: si habla español, tiene a chavira disposición servicios gratuitos de asistencia lingüística. Rafita al [phone].  CHÚ Ý: N?u b?n nói Ti?ng Vi?t, có các d?ch v? h? tr? ngôn ng? mi?n phí dành cho b?n. G?i s?  [phone].        REGISTRATION AUTHORIZATION  Form No. 65400 (Rev. 3/25/2024)   Page 3 of 3     Patient

## 2025-08-01 ENCOUNTER — PATIENT MESSAGE (OUTPATIENT)
Facility: HOSPITAL | Age: 70
End: 2025-08-01
Payer: COMMERCIAL